# Patient Record
Sex: MALE | Race: WHITE | Employment: OTHER | URBAN - METROPOLITAN AREA
[De-identification: names, ages, dates, MRNs, and addresses within clinical notes are randomized per-mention and may not be internally consistent; named-entity substitution may affect disease eponyms.]

---

## 2017-01-30 ENCOUNTER — ANTI-COAG VISIT (OUTPATIENT)
Dept: PHARMACY | Facility: CLINIC | Age: 67
End: 2017-01-30

## 2017-01-30 DIAGNOSIS — I48.0 PAROXYSMAL ATRIAL FIBRILLATION (HCC): ICD-10-CM

## 2017-01-30 DIAGNOSIS — Z79.01 LONG TERM CURRENT USE OF ANTICOAGULANT THERAPY: ICD-10-CM

## 2017-01-30 LAB — INTERNATIONAL NORMALIZATION RATIO, POC: 3.2

## 2017-02-27 RX ORDER — SOTALOL HYDROCHLORIDE 80 MG/1
TABLET ORAL
Qty: 60 TABLET | Refills: 5 | Status: SHIPPED | OUTPATIENT
Start: 2017-02-27 | End: 2018-01-08 | Stop reason: SDUPTHER

## 2017-03-03 ENCOUNTER — ANTI-COAG VISIT (OUTPATIENT)
Dept: PHARMACY | Facility: CLINIC | Age: 67
End: 2017-03-03

## 2017-03-03 DIAGNOSIS — I48.0 PAROXYSMAL ATRIAL FIBRILLATION (HCC): ICD-10-CM

## 2017-03-03 DIAGNOSIS — Z79.01 LONG TERM CURRENT USE OF ANTICOAGULANT THERAPY: ICD-10-CM

## 2017-03-03 LAB — INTERNATIONAL NORMALIZATION RATIO, POC: 2.4

## 2017-04-03 ENCOUNTER — TELEPHONE (OUTPATIENT)
Dept: PHARMACY | Facility: CLINIC | Age: 67
End: 2017-04-03

## 2017-04-06 ENCOUNTER — HOSPITAL ENCOUNTER (OUTPATIENT)
Dept: CT IMAGING | Age: 67
Discharge: HOME | End: 2017-04-06
Attending: INTERNAL MEDICINE | Admitting: INTERNAL MEDICINE

## 2017-04-06 DIAGNOSIS — D75.1 POLYCYTHEMIA: ICD-10-CM

## 2017-04-06 DIAGNOSIS — D75.1 SECONDARY POLYCYTHEMIA: ICD-10-CM

## 2017-04-13 ENCOUNTER — ANTI-COAG VISIT (OUTPATIENT)
Dept: PHARMACY | Facility: CLINIC | Age: 67
End: 2017-04-13

## 2017-04-13 DIAGNOSIS — Z79.01 LONG TERM CURRENT USE OF ANTICOAGULANT THERAPY: ICD-10-CM

## 2017-04-13 DIAGNOSIS — I48.0 PAROXYSMAL ATRIAL FIBRILLATION (HCC): ICD-10-CM

## 2017-04-13 LAB — INR BLD: 2.4

## 2017-04-20 ENCOUNTER — OFFICE VISIT (OUTPATIENT)
Dept: CARDIOLOGY CLINIC | Age: 67
End: 2017-04-20

## 2017-04-20 VITALS
BODY MASS INDEX: 25.74 KG/M2 | SYSTOLIC BLOOD PRESSURE: 134 MMHG | WEIGHT: 207 LBS | HEART RATE: 60 BPM | DIASTOLIC BLOOD PRESSURE: 80 MMHG

## 2017-04-20 DIAGNOSIS — R00.2 PALPITATIONS: ICD-10-CM

## 2017-04-20 DIAGNOSIS — I48.0 PAROXYSMAL ATRIAL FIBRILLATION (HCC): Primary | ICD-10-CM

## 2017-04-20 DIAGNOSIS — I34.0 NON-RHEUMATIC MITRAL REGURGITATION: ICD-10-CM

## 2017-04-20 PROCEDURE — G8427 DOCREV CUR MEDS BY ELIG CLIN: HCPCS | Performed by: INTERNAL MEDICINE

## 2017-04-20 PROCEDURE — 3017F COLORECTAL CA SCREEN DOC REV: CPT | Performed by: INTERNAL MEDICINE

## 2017-04-20 PROCEDURE — 1123F ACP DISCUSS/DSCN MKR DOCD: CPT | Performed by: INTERNAL MEDICINE

## 2017-04-20 PROCEDURE — 99214 OFFICE O/P EST MOD 30 MIN: CPT | Performed by: INTERNAL MEDICINE

## 2017-04-20 PROCEDURE — 1036F TOBACCO NON-USER: CPT | Performed by: INTERNAL MEDICINE

## 2017-04-20 PROCEDURE — 4040F PNEUMOC VAC/ADMIN/RCVD: CPT | Performed by: INTERNAL MEDICINE

## 2017-04-20 PROCEDURE — G8420 CALC BMI NORM PARAMETERS: HCPCS | Performed by: INTERNAL MEDICINE

## 2017-04-28 ENCOUNTER — OFFICE VISIT (OUTPATIENT)
Dept: INTERNAL MEDICINE | Age: 67
End: 2017-04-28

## 2017-04-28 VITALS
WEIGHT: 211 LBS | SYSTOLIC BLOOD PRESSURE: 128 MMHG | HEART RATE: 60 BPM | RESPIRATION RATE: 16 BRPM | DIASTOLIC BLOOD PRESSURE: 80 MMHG | BODY MASS INDEX: 26.24 KG/M2

## 2017-04-28 DIAGNOSIS — C83.30 DLBCL (DIFFUSE LARGE B CELL LYMPHOMA) (HCC): ICD-10-CM

## 2017-04-28 DIAGNOSIS — F51.01 PRIMARY INSOMNIA: ICD-10-CM

## 2017-04-28 DIAGNOSIS — N20.0 NEPHROLITHIASIS: ICD-10-CM

## 2017-04-28 DIAGNOSIS — D75.1 POLYCYTHEMIA: Primary | ICD-10-CM

## 2017-04-28 DIAGNOSIS — I48.0 PAROXYSMAL ATRIAL FIBRILLATION (HCC): ICD-10-CM

## 2017-04-28 DIAGNOSIS — E78.00 PURE HYPERCHOLESTEROLEMIA: ICD-10-CM

## 2017-04-28 PROCEDURE — 99214 OFFICE O/P EST MOD 30 MIN: CPT | Performed by: INTERNAL MEDICINE

## 2017-04-28 PROCEDURE — 4040F PNEUMOC VAC/ADMIN/RCVD: CPT | Performed by: INTERNAL MEDICINE

## 2017-04-28 PROCEDURE — 1123F ACP DISCUSS/DSCN MKR DOCD: CPT | Performed by: INTERNAL MEDICINE

## 2017-04-28 PROCEDURE — 1036F TOBACCO NON-USER: CPT | Performed by: INTERNAL MEDICINE

## 2017-04-28 PROCEDURE — 3017F COLORECTAL CA SCREEN DOC REV: CPT | Performed by: INTERNAL MEDICINE

## 2017-04-28 PROCEDURE — G8420 CALC BMI NORM PARAMETERS: HCPCS | Performed by: INTERNAL MEDICINE

## 2017-04-28 PROCEDURE — G8427 DOCREV CUR MEDS BY ELIG CLIN: HCPCS | Performed by: INTERNAL MEDICINE

## 2017-04-28 RX ORDER — AMOXICILLIN 875 MG/1
875 TABLET, COATED ORAL 2 TIMES DAILY
Qty: 20 TABLET | Refills: 0 | Status: SHIPPED | OUTPATIENT
Start: 2017-04-28 | End: 2017-05-08

## 2017-04-28 ASSESSMENT — ENCOUNTER SYMPTOMS
WHEEZING: 0
GASTROINTESTINAL NEGATIVE: 1

## 2017-05-10 ENCOUNTER — ANTI-COAG VISIT (OUTPATIENT)
Dept: PHARMACY | Facility: CLINIC | Age: 67
End: 2017-05-10

## 2017-05-10 DIAGNOSIS — I48.0 PAROXYSMAL ATRIAL FIBRILLATION (HCC): ICD-10-CM

## 2017-05-10 DIAGNOSIS — Z79.01 LONG TERM CURRENT USE OF ANTICOAGULANT THERAPY: ICD-10-CM

## 2017-05-10 LAB — INTERNATIONAL NORMALIZATION RATIO, POC: 2.4

## 2017-06-09 ENCOUNTER — ANTI-COAG VISIT (OUTPATIENT)
Dept: PHARMACY | Facility: CLINIC | Age: 67
End: 2017-06-09

## 2017-06-09 DIAGNOSIS — I48.0 PAROXYSMAL ATRIAL FIBRILLATION (HCC): ICD-10-CM

## 2017-06-09 DIAGNOSIS — Z79.01 LONG TERM CURRENT USE OF ANTICOAGULANT THERAPY: ICD-10-CM

## 2017-06-09 LAB — INTERNATIONAL NORMALIZATION RATIO, POC: 3

## 2017-06-21 ENCOUNTER — INITIAL CONSULT (OUTPATIENT)
Dept: PULMONOLOGY | Age: 67
End: 2017-06-21

## 2017-06-21 VITALS
HEART RATE: 81 BPM | OXYGEN SATURATION: 98 % | WEIGHT: 212 LBS | HEIGHT: 73 IN | SYSTOLIC BLOOD PRESSURE: 122 MMHG | DIASTOLIC BLOOD PRESSURE: 84 MMHG | BODY MASS INDEX: 28.1 KG/M2

## 2017-06-21 DIAGNOSIS — K21.9 GERD WITHOUT ESOPHAGITIS: Chronic | ICD-10-CM

## 2017-06-21 DIAGNOSIS — G47.10 HYPERSOMNIA: Primary | ICD-10-CM

## 2017-06-21 DIAGNOSIS — R06.83 SNORING: ICD-10-CM

## 2017-06-21 DIAGNOSIS — D75.1 POLYCYTHEMIA: Chronic | ICD-10-CM

## 2017-06-21 DIAGNOSIS — I48.0 PAROXYSMAL ATRIAL FIBRILLATION (HCC): Chronic | ICD-10-CM

## 2017-06-21 PROCEDURE — 99204 OFFICE O/P NEW MOD 45 MIN: CPT | Performed by: INTERNAL MEDICINE

## 2017-06-21 PROCEDURE — 1123F ACP DISCUSS/DSCN MKR DOCD: CPT | Performed by: INTERNAL MEDICINE

## 2017-06-21 PROCEDURE — G8427 DOCREV CUR MEDS BY ELIG CLIN: HCPCS | Performed by: INTERNAL MEDICINE

## 2017-06-21 PROCEDURE — G8419 CALC BMI OUT NRM PARAM NOF/U: HCPCS | Performed by: INTERNAL MEDICINE

## 2017-06-21 PROCEDURE — 1036F TOBACCO NON-USER: CPT | Performed by: INTERNAL MEDICINE

## 2017-06-21 PROCEDURE — 3017F COLORECTAL CA SCREEN DOC REV: CPT | Performed by: INTERNAL MEDICINE

## 2017-06-21 PROCEDURE — 4040F PNEUMOC VAC/ADMIN/RCVD: CPT | Performed by: INTERNAL MEDICINE

## 2017-06-21 ASSESSMENT — ENCOUNTER SYMPTOMS
ALLERGIC/IMMUNOLOGIC NEGATIVE: 1
PHOTOPHOBIA: 0
RHINORRHEA: 0
EYE PAIN: 0
ABDOMINAL PAIN: 0
SHORTNESS OF BREATH: 0
APNEA: 1
NAUSEA: 0
CHOKING: 0
VOMITING: 0
CHEST TIGHTNESS: 0
ABDOMINAL DISTENTION: 0

## 2017-06-21 ASSESSMENT — SLEEP AND FATIGUE QUESTIONNAIRES
HOW LIKELY ARE YOU TO NOD OFF OR FALL ASLEEP WHILE SITTING QUIETLY AFTER LUNCH WITHOUT ALCOHOL: 1
HOW LIKELY ARE YOU TO NOD OFF OR FALL ASLEEP WHEN YOU ARE A PASSENGER IN A CAR FOR AN HOUR WITHOUT A BREAK: 1
HOW LIKELY ARE YOU TO NOD OFF OR FALL ASLEEP WHILE SITTING AND TALKING TO SOMEONE: 0
NECK CIRCUMFERENCE (INCHES): 16
HOW LIKELY ARE YOU TO NOD OFF OR FALL ASLEEP WHILE WATCHING TV: 1
HOW LIKELY ARE YOU TO NOD OFF OR FALL ASLEEP IN A CAR, WHILE STOPPED FOR A FEW MINUTES IN TRAFFIC: 0
HOW LIKELY ARE YOU TO NOD OFF OR FALL ASLEEP WHILE LYING DOWN TO REST IN THE AFTERNOON WHEN CIRCUMSTANCES PERMIT: 2
ESS TOTAL SCORE: 5
HOW LIKELY ARE YOU TO NOD OFF OR FALL ASLEEP WHILE SITTING AND READING: 0
HOW LIKELY ARE YOU TO NOD OFF OR FALL ASLEEP WHILE SITTING INACTIVE IN A PUBLIC PLACE: 0

## 2017-07-06 ENCOUNTER — HOSPITAL ENCOUNTER (OUTPATIENT)
Dept: SLEEP MEDICINE | Age: 67
Discharge: HOME | End: 2017-07-06

## 2017-07-06 DIAGNOSIS — R06.83 SNORING: ICD-10-CM

## 2017-07-06 DIAGNOSIS — G47.10 HYPERSOMNIA: ICD-10-CM

## 2017-07-06 PROCEDURE — 95810 POLYSOM 6/> YRS 4/> PARAM: CPT | Performed by: INTERNAL MEDICINE

## 2017-07-11 ENCOUNTER — TELEPHONE (OUTPATIENT)
Dept: SLEEP MEDICINE | Age: 67
End: 2017-07-11

## 2017-07-12 ENCOUNTER — TELEPHONE (OUTPATIENT)
Dept: SLEEP MEDICINE | Age: 67
End: 2017-07-12

## 2017-07-13 ENCOUNTER — TELEPHONE (OUTPATIENT)
Dept: SLEEP MEDICINE | Age: 67
End: 2017-07-13

## 2017-07-13 DIAGNOSIS — G47.33 OBSTRUCTIVE SLEEP APNEA (ADULT) (PEDIATRIC): Primary | ICD-10-CM

## 2017-07-14 ENCOUNTER — ANTI-COAG VISIT (OUTPATIENT)
Dept: PHARMACY | Facility: CLINIC | Age: 67
End: 2017-07-14

## 2017-07-14 DIAGNOSIS — I48.0 PAROXYSMAL ATRIAL FIBRILLATION (HCC): ICD-10-CM

## 2017-07-14 DIAGNOSIS — Z79.01 LONG TERM CURRENT USE OF ANTICOAGULANT THERAPY: ICD-10-CM

## 2017-07-14 LAB — INTERNATIONAL NORMALIZATION RATIO, POC: 2.1

## 2017-07-27 ENCOUNTER — HOSPITAL ENCOUNTER (OUTPATIENT)
Dept: SLEEP MEDICINE | Age: 67
Discharge: HOME | End: 2017-07-29
Attending: INTERNAL MEDICINE | Admitting: INTERNAL MEDICINE

## 2017-07-27 DIAGNOSIS — G47.33 OBSTRUCTIVE SLEEP APNEA (ADULT) (PEDIATRIC): ICD-10-CM

## 2017-07-27 PROCEDURE — 95811 POLYSOM 6/>YRS CPAP 4/> PARM: CPT | Performed by: INTERNAL MEDICINE

## 2017-08-01 ENCOUNTER — TELEPHONE (OUTPATIENT)
Dept: PULMONOLOGY | Age: 67
End: 2017-08-01

## 2017-08-07 ENCOUNTER — TELEPHONE (OUTPATIENT)
Dept: SLEEP MEDICINE | Age: 67
End: 2017-08-07

## 2017-08-09 ENCOUNTER — ANTI-COAG VISIT (OUTPATIENT)
Dept: PHARMACY | Facility: CLINIC | Age: 67
End: 2017-08-09

## 2017-08-09 DIAGNOSIS — Z79.01 LONG TERM CURRENT USE OF ANTICOAGULANT THERAPY: ICD-10-CM

## 2017-08-09 DIAGNOSIS — I48.0 PAROXYSMAL ATRIAL FIBRILLATION (HCC): ICD-10-CM

## 2017-08-09 LAB — INTERNATIONAL NORMALIZATION RATIO, POC: 2.2

## 2017-08-22 ENCOUNTER — OFFICE VISIT (OUTPATIENT)
Dept: INTERNAL MEDICINE | Age: 67
End: 2017-08-22

## 2017-08-22 VITALS
BODY MASS INDEX: 26 KG/M2 | SYSTOLIC BLOOD PRESSURE: 116 MMHG | WEIGHT: 208 LBS | RESPIRATION RATE: 16 BRPM | HEART RATE: 60 BPM | DIASTOLIC BLOOD PRESSURE: 70 MMHG

## 2017-08-22 DIAGNOSIS — L21.9 SEBORRHEA: Primary | ICD-10-CM

## 2017-08-22 DIAGNOSIS — G47.33 OBSTRUCTIVE SLEEP APNEA (ADULT) (PEDIATRIC): ICD-10-CM

## 2017-08-22 PROCEDURE — G8427 DOCREV CUR MEDS BY ELIG CLIN: HCPCS | Performed by: INTERNAL MEDICINE

## 2017-08-22 PROCEDURE — 4040F PNEUMOC VAC/ADMIN/RCVD: CPT | Performed by: INTERNAL MEDICINE

## 2017-08-22 PROCEDURE — 3017F COLORECTAL CA SCREEN DOC REV: CPT | Performed by: INTERNAL MEDICINE

## 2017-08-22 PROCEDURE — 1123F ACP DISCUSS/DSCN MKR DOCD: CPT | Performed by: INTERNAL MEDICINE

## 2017-08-22 PROCEDURE — 1036F TOBACCO NON-USER: CPT | Performed by: INTERNAL MEDICINE

## 2017-08-22 PROCEDURE — 99213 OFFICE O/P EST LOW 20 MIN: CPT | Performed by: INTERNAL MEDICINE

## 2017-08-22 PROCEDURE — G8419 CALC BMI OUT NRM PARAM NOF/U: HCPCS | Performed by: INTERNAL MEDICINE

## 2017-08-22 RX ORDER — RANITIDINE 150 MG/1
TABLET ORAL
Qty: 180 TABLET | Refills: 0 | Status: SHIPPED | OUTPATIENT
Start: 2017-08-22 | End: 2018-01-03 | Stop reason: SDUPTHER

## 2017-08-22 RX ORDER — MOMETASONE FUROATE 1 MG/G
CREAM TOPICAL
Qty: 30 G | Refills: 1 | Status: SHIPPED | OUTPATIENT
Start: 2017-08-22 | End: 2020-08-20

## 2017-08-22 ASSESSMENT — ENCOUNTER SYMPTOMS
GASTROINTESTINAL NEGATIVE: 1
WHEEZING: 0

## 2017-08-24 ENCOUNTER — OFFICE VISIT (OUTPATIENT)
Dept: CARDIOLOGY CLINIC | Age: 67
End: 2017-08-24

## 2017-08-24 VITALS
BODY MASS INDEX: 26.25 KG/M2 | DIASTOLIC BLOOD PRESSURE: 66 MMHG | SYSTOLIC BLOOD PRESSURE: 122 MMHG | WEIGHT: 210 LBS | HEART RATE: 72 BPM

## 2017-08-24 DIAGNOSIS — R00.2 PALPITATIONS: ICD-10-CM

## 2017-08-24 DIAGNOSIS — I48.0 PAROXYSMAL ATRIAL FIBRILLATION (HCC): Primary | ICD-10-CM

## 2017-08-24 DIAGNOSIS — I34.0 NON-RHEUMATIC MITRAL REGURGITATION: ICD-10-CM

## 2017-08-24 PROCEDURE — 99214 OFFICE O/P EST MOD 30 MIN: CPT | Performed by: INTERNAL MEDICINE

## 2017-08-24 PROCEDURE — 1036F TOBACCO NON-USER: CPT | Performed by: INTERNAL MEDICINE

## 2017-08-24 PROCEDURE — 4040F PNEUMOC VAC/ADMIN/RCVD: CPT | Performed by: INTERNAL MEDICINE

## 2017-08-24 PROCEDURE — 3017F COLORECTAL CA SCREEN DOC REV: CPT | Performed by: INTERNAL MEDICINE

## 2017-08-24 PROCEDURE — 1123F ACP DISCUSS/DSCN MKR DOCD: CPT | Performed by: INTERNAL MEDICINE

## 2017-08-24 PROCEDURE — G8427 DOCREV CUR MEDS BY ELIG CLIN: HCPCS | Performed by: INTERNAL MEDICINE

## 2017-08-24 PROCEDURE — G8419 CALC BMI OUT NRM PARAM NOF/U: HCPCS | Performed by: INTERNAL MEDICINE

## 2017-08-30 ENCOUNTER — OFFICE VISIT (OUTPATIENT)
Dept: INTERNAL MEDICINE CLINIC | Age: 67
End: 2017-08-30

## 2017-08-30 VITALS
DIASTOLIC BLOOD PRESSURE: 86 MMHG | WEIGHT: 207 LBS | HEART RATE: 64 BPM | BODY MASS INDEX: 27.43 KG/M2 | OXYGEN SATURATION: 97 % | HEIGHT: 73 IN | SYSTOLIC BLOOD PRESSURE: 147 MMHG

## 2017-08-30 DIAGNOSIS — Z00.00 PREVENTATIVE HEALTH CARE: ICD-10-CM

## 2017-08-30 DIAGNOSIS — E78.00 PURE HYPERCHOLESTEROLEMIA: Chronic | ICD-10-CM

## 2017-08-30 DIAGNOSIS — K21.9 GASTROESOPHAGEAL REFLUX DISEASE, ESOPHAGITIS PRESENCE NOT SPECIFIED: Primary | ICD-10-CM

## 2017-08-30 DIAGNOSIS — I48.0 PAROXYSMAL ATRIAL FIBRILLATION (HCC): Chronic | ICD-10-CM

## 2017-08-30 DIAGNOSIS — D45 PV (POLYCYTHEMIA VERA) (HCC): ICD-10-CM

## 2017-08-30 LAB — HEPATITIS C ANTIBODY INTERPRETATION: NORMAL

## 2017-08-30 PROCEDURE — 4040F PNEUMOC VAC/ADMIN/RCVD: CPT | Performed by: INTERNAL MEDICINE

## 2017-08-30 PROCEDURE — G8419 CALC BMI OUT NRM PARAM NOF/U: HCPCS | Performed by: INTERNAL MEDICINE

## 2017-08-30 PROCEDURE — 1036F TOBACCO NON-USER: CPT | Performed by: INTERNAL MEDICINE

## 2017-08-30 PROCEDURE — 99213 OFFICE O/P EST LOW 20 MIN: CPT | Performed by: INTERNAL MEDICINE

## 2017-08-30 PROCEDURE — 1123F ACP DISCUSS/DSCN MKR DOCD: CPT | Performed by: INTERNAL MEDICINE

## 2017-08-30 PROCEDURE — G8428 CUR MEDS NOT DOCUMENT: HCPCS | Performed by: INTERNAL MEDICINE

## 2017-08-30 PROCEDURE — 3017F COLORECTAL CA SCREEN DOC REV: CPT | Performed by: INTERNAL MEDICINE

## 2017-08-30 ASSESSMENT — ENCOUNTER SYMPTOMS
BACK PAIN: 0
NAUSEA: 0
VOMITING: 0
DIARRHEA: 0
BLOOD IN STOOL: 0
CONSTIPATION: 0
TROUBLE SWALLOWING: 0
RHINORRHEA: 0
EYE DISCHARGE: 0
ABDOMINAL PAIN: 0
APNEA: 0
WHEEZING: 0
COUGH: 0
ABDOMINAL DISTENTION: 0
SHORTNESS OF BREATH: 0
SORE THROAT: 0
EYE PAIN: 0

## 2017-09-12 ENCOUNTER — ANTI-COAG VISIT (OUTPATIENT)
Dept: PHARMACY | Facility: CLINIC | Age: 67
End: 2017-09-12

## 2017-09-12 DIAGNOSIS — I48.0 PAROXYSMAL ATRIAL FIBRILLATION (HCC): ICD-10-CM

## 2017-09-12 DIAGNOSIS — Z79.01 LONG TERM CURRENT USE OF ANTICOAGULANT THERAPY: ICD-10-CM

## 2017-09-12 LAB — INTERNATIONAL NORMALIZATION RATIO, POC: 1.8

## 2017-09-20 ENCOUNTER — OFFICE VISIT (OUTPATIENT)
Dept: PULMONOLOGY | Age: 67
End: 2017-09-20

## 2017-09-20 VITALS
SYSTOLIC BLOOD PRESSURE: 110 MMHG | BODY MASS INDEX: 28.23 KG/M2 | HEART RATE: 65 BPM | WEIGHT: 213 LBS | HEIGHT: 73 IN | OXYGEN SATURATION: 98 % | DIASTOLIC BLOOD PRESSURE: 75 MMHG

## 2017-09-20 DIAGNOSIS — I48.0 PAROXYSMAL ATRIAL FIBRILLATION (HCC): Chronic | ICD-10-CM

## 2017-09-20 DIAGNOSIS — D75.1 POLYCYTHEMIA: Chronic | ICD-10-CM

## 2017-09-20 DIAGNOSIS — G47.33 OBSTRUCTIVE SLEEP APNEA (ADULT) (PEDIATRIC): Primary | ICD-10-CM

## 2017-09-20 DIAGNOSIS — K21.9 GERD WITHOUT ESOPHAGITIS: Chronic | ICD-10-CM

## 2017-09-20 PROCEDURE — 1036F TOBACCO NON-USER: CPT | Performed by: NURSE PRACTITIONER

## 2017-09-20 PROCEDURE — G8417 CALC BMI ABV UP PARAM F/U: HCPCS | Performed by: NURSE PRACTITIONER

## 2017-09-20 PROCEDURE — 4040F PNEUMOC VAC/ADMIN/RCVD: CPT | Performed by: NURSE PRACTITIONER

## 2017-09-20 PROCEDURE — G8427 DOCREV CUR MEDS BY ELIG CLIN: HCPCS | Performed by: NURSE PRACTITIONER

## 2017-09-20 PROCEDURE — 3017F COLORECTAL CA SCREEN DOC REV: CPT | Performed by: NURSE PRACTITIONER

## 2017-09-20 PROCEDURE — 99214 OFFICE O/P EST MOD 30 MIN: CPT | Performed by: NURSE PRACTITIONER

## 2017-09-20 PROCEDURE — 1123F ACP DISCUSS/DSCN MKR DOCD: CPT | Performed by: NURSE PRACTITIONER

## 2017-09-20 ASSESSMENT — SLEEP AND FATIGUE QUESTIONNAIRES
HOW LIKELY ARE YOU TO NOD OFF OR FALL ASLEEP WHILE WATCHING TV: 2
HOW LIKELY ARE YOU TO NOD OFF OR FALL ASLEEP WHILE SITTING INACTIVE IN A PUBLIC PLACE: 0
HOW LIKELY ARE YOU TO NOD OFF OR FALL ASLEEP WHEN YOU ARE A PASSENGER IN A CAR FOR AN HOUR WITHOUT A BREAK: 0
HOW LIKELY ARE YOU TO NOD OFF OR FALL ASLEEP WHILE SITTING AND READING: 0
HOW LIKELY ARE YOU TO NOD OFF OR FALL ASLEEP IN A CAR, WHILE STOPPED FOR A FEW MINUTES IN TRAFFIC: 0
HOW LIKELY ARE YOU TO NOD OFF OR FALL ASLEEP WHILE LYING DOWN TO REST IN THE AFTERNOON WHEN CIRCUMSTANCES PERMIT: 0
HOW LIKELY ARE YOU TO NOD OFF OR FALL ASLEEP WHILE SITTING QUIETLY AFTER LUNCH WITHOUT ALCOHOL: 0
ESS TOTAL SCORE: 2
HOW LIKELY ARE YOU TO NOD OFF OR FALL ASLEEP WHILE SITTING AND TALKING TO SOMEONE: 0

## 2017-09-20 ASSESSMENT — ENCOUNTER SYMPTOMS
ABDOMINAL DISTENTION: 0
ABDOMINAL PAIN: 0
COUGH: 0
SINUS PRESSURE: 0
APNEA: 0
RHINORRHEA: 0
SHORTNESS OF BREATH: 0

## 2017-10-11 ENCOUNTER — HOSPITAL ENCOUNTER (OUTPATIENT)
Dept: OTHER | Age: 67
Discharge: HOME | End: 2017-10-31
Attending: INTERNAL MEDICINE | Admitting: INTERNAL MEDICINE

## 2017-10-11 ENCOUNTER — ANTI-COAG VISIT (OUTPATIENT)
Dept: PHARMACY | Facility: CLINIC | Age: 67
End: 2017-10-11

## 2017-10-11 DIAGNOSIS — I48.0 PAROXYSMAL ATRIAL FIBRILLATION (HCC): ICD-10-CM

## 2017-10-11 DIAGNOSIS — Z79.01 LONG TERM CURRENT USE OF ANTICOAGULANT THERAPY: ICD-10-CM

## 2017-10-11 LAB — INTERNATIONAL NORMALIZATION RATIO, POC: 2

## 2017-10-11 NOTE — PROGRESS NOTES
ANTICOAGULATION SERVICE    Alejandra Jones is a 75 y.o. male with PMHx significant for Afib, internal hemorrhoids, DVT, lymphoma, subdural hematoma who presents to clinic 10/11/2017 for anticoagulation monitoring and adjustment.     Anticoagulation Indication(s):  Afib + h/o DVT  Referring Physician:  Dr. Shaina Ortiz (switched to Dr. Lawanda Alcala on 8/30, faxed new referral x2)  Goal INR Range:  2-3  Duration of Anticoagulation Therapy:  Indefinite  Time of day dose taken:  PM  Product patient has at home:  warfarin 5 mg    INR Summary                           Warfarin regimen (mg)  Date INR   A/P   Sun Mon Tue Wed Thu Fri Sat Mg/wk  10/11 2.0 At goal, no change 5 2.5 5 5 5 2.5 5 30  9/12 1.8 Below goal, continue 5 2.5 5 5 5 2.5 5 30  8/9 2.2 At goal, no change 5 2.5 5 5 5 2.5 5 30  7/14 2.1 At goal, no change 5 2.5 5 5 5 2.5 5 30  6/9 3.0 At goal, no change 5 2.5 5 5 5 2.5 5 30  5/10 2.4 At goal, no change 5 2.5 5 5 5 2.5 5 30  4/13 2.4 At goal, no change 5 2.5 5 5 5 2.5 5 30  3/3 2.4 At goal, no change 5 2.5 5 5 5 2.5 5 30  1/30 3.2 Above goal, continue 5 2.5 5 5 5 2.5 5 30  12/22 2.8  At goal, no change 5 2.5 5 5 5 2.5 5 30  11/17 2.8 At goal, no change 5 2.5 5 5 5 2.5 5 30  10/21 2.3 At goal, continue 5 2.5 5 5 5 2.5 5 30    Patient History:   Recent hospitalizations/HC visits OV 9/20 pul for sleep apnea: no med changes   Recent medication changes None reported   Medications taken regularly that may interact with warfarin or alter INR No significant drug interactions identified   Warfarin dose taken as prescribed Yes; uses pillbox   Signs/symptoms of bleeding None reported    Vitamin K intake -Normally has ~3-4 servings/week of broccoli/salads (no kale)  -Patient eats 1/2 can of spinach 3x/wk (Tu/Th/F)   Recent vomiting/diarrhea/fever, changes in weight or activity level None reported   Tobacco or alcohol use Denies use of either   Upcoming surgeries or procedures None reported     Assessment/Plan:   Patient's INR was

## 2017-11-01 ENCOUNTER — HOSPITAL ENCOUNTER (OUTPATIENT)
Dept: OTHER | Age: 67
Discharge: HOME | End: 2017-11-30
Attending: INTERNAL MEDICINE | Admitting: INTERNAL MEDICINE

## 2017-11-15 ENCOUNTER — ANTI-COAG VISIT (OUTPATIENT)
Dept: PHARMACY | Facility: CLINIC | Age: 67
End: 2017-11-15

## 2017-11-15 DIAGNOSIS — I48.0 PAROXYSMAL ATRIAL FIBRILLATION (HCC): ICD-10-CM

## 2017-11-15 DIAGNOSIS — Z79.01 LONG TERM CURRENT USE OF ANTICOAGULANT THERAPY: ICD-10-CM

## 2017-11-15 LAB — INTERNATIONAL NORMALIZATION RATIO, POC: 1.7

## 2017-11-15 NOTE — PROGRESS NOTES
ANTICOAGULATION SERVICE    Avi Osborne is a 75 y.o. male with PMHx significant for Afib, internal hemorrhoids, DVT, lymphoma, subdural hematoma who presents to clinic 11/15/2017 for anticoagulation monitoring and adjustment.     Anticoagulation Indication(s):  Afib + h/o DVT  Referring Physician:  Dr. Andree Pratt  Goal INR Range:  2-3  Duration of Anticoagulation Therapy:  Indefinite  Time of day dose taken:  PM  Product patient has at home:  warfarin 5 mg    INR Summary                           Warfarin regimen (mg)  Date INR   A/P   Sun Mon Tue Wed Thu Fri Sat Mg/wk  11/15 1.7 Below goal, continue 5 2.5 5 5 5 2.5 5 30  10/11 2.0 At goal, no change 5 2.5 5 5 5 2.5 5 30  9/12 1.8 Below goal, continue 5 2.5 5 5 5 2.5 5 30  8/9 2.2 At goal, no change 5 2.5 5 5 5 2.5 5 30  7/14 2.1 At goal, no change 5 2.5 5 5 5 2.5 5 30  6/9 3.0 At goal, no change 5 2.5 5 5 5 2.5 5 30  5/10 2.4 At goal, no change 5 2.5 5 5 5 2.5 5 30  4/13 2.4 At goal, no change 5 2.5 5 5 5 2.5 5 30  3/3 2.4 At goal, no change 5 2.5 5 5 5 2.5 5 30  1/30 3.2 Above goal, continue 5 2.5 5 5 5 2.5 5 30  12/22 2.8  At goal, no change 5 2.5 5 5 5 2.5 5 30  11/17 2.8 At goal, no change 5 2.5 5 5 5 2.5 5 30  10/21 2.3 At goal, continue 5 2.5 5 5 5 2.5 5 30    Patient History:   Recent hospitalizations/HC visits None since last visit   Recent medication changes None reported   Medications taken regularly that may interact with warfarin or alter INR No significant drug interactions identified   Warfarin dose taken as prescribed Yes; uses pillbox   Signs/symptoms of bleeding None reported    Vitamin K intake -Normally has ~3-4 servings/week of broccoli/salads (no kale)  -Patient eats 1/2 can of spinach 3x/wk (Tu/Th/F)   Recent vomiting/diarrhea/fever, changes in weight or activity level None reported   Tobacco or alcohol use Denies use of either   Upcoming surgeries or procedures None reported     Assessment/Plan:   Patient's INR was subtherapeutic today (1.7), likely due to increased vitamin K intake over the past few days. Patient was instructed to continue warfarin regimen of 2.5 mg on M/F and 5 mg all other days, and resume usual vitamin K intake. Repeat INR in 4 weeks. Patient was reminded to maintain consistent vitamin K intake and call with any bleeding, medication changes, or fever/vomiting/diarrhea. Next Clinic Appointment:  12/13    Please call Bin at [phone] with any questions. Thanks! Kailyn Billingsley.  Dinesh Malhotra, PharmD  [facility]  Ph: [phone]  11/15/2017 9:59 AM

## 2017-12-01 ENCOUNTER — HOSPITAL ENCOUNTER (OUTPATIENT)
Dept: OTHER | Age: 67
Discharge: HOME | End: 2017-12-31
Attending: INTERNAL MEDICINE | Admitting: INTERNAL MEDICINE

## 2017-12-13 ENCOUNTER — OFFICE VISIT (OUTPATIENT)
Dept: CARDIOLOGY CLINIC | Age: 67
End: 2017-12-13

## 2017-12-13 ENCOUNTER — ANTI-COAG VISIT (OUTPATIENT)
Dept: PHARMACY | Facility: CLINIC | Age: 67
End: 2017-12-13

## 2017-12-13 VITALS
WEIGHT: 216 LBS | DIASTOLIC BLOOD PRESSURE: 80 MMHG | BODY MASS INDEX: 28.5 KG/M2 | SYSTOLIC BLOOD PRESSURE: 132 MMHG | HEART RATE: 58 BPM

## 2017-12-13 DIAGNOSIS — I48.0 PAROXYSMAL ATRIAL FIBRILLATION (HCC): Primary | ICD-10-CM

## 2017-12-13 DIAGNOSIS — R00.2 PALPITATIONS: ICD-10-CM

## 2017-12-13 DIAGNOSIS — I48.0 PAROXYSMAL ATRIAL FIBRILLATION (HCC): ICD-10-CM

## 2017-12-13 DIAGNOSIS — Z79.01 LONG TERM CURRENT USE OF ANTICOAGULANT THERAPY: ICD-10-CM

## 2017-12-13 DIAGNOSIS — I34.0 NON-RHEUMATIC MITRAL REGURGITATION: ICD-10-CM

## 2017-12-13 LAB — INTERNATIONAL NORMALIZATION RATIO, POC: 2.1

## 2017-12-13 PROCEDURE — 1036F TOBACCO NON-USER: CPT | Performed by: INTERNAL MEDICINE

## 2017-12-13 PROCEDURE — 4040F PNEUMOC VAC/ADMIN/RCVD: CPT | Performed by: INTERNAL MEDICINE

## 2017-12-13 PROCEDURE — 99214 OFFICE O/P EST MOD 30 MIN: CPT | Performed by: INTERNAL MEDICINE

## 2017-12-13 PROCEDURE — G8484 FLU IMMUNIZE NO ADMIN: HCPCS | Performed by: INTERNAL MEDICINE

## 2017-12-13 PROCEDURE — G8427 DOCREV CUR MEDS BY ELIG CLIN: HCPCS | Performed by: INTERNAL MEDICINE

## 2017-12-13 PROCEDURE — 1123F ACP DISCUSS/DSCN MKR DOCD: CPT | Performed by: INTERNAL MEDICINE

## 2017-12-13 PROCEDURE — 3017F COLORECTAL CA SCREEN DOC REV: CPT | Performed by: INTERNAL MEDICINE

## 2017-12-13 PROCEDURE — G8417 CALC BMI ABV UP PARAM F/U: HCPCS | Performed by: INTERNAL MEDICINE

## 2017-12-13 NOTE — PROGRESS NOTES
Subjective:      Patient ID: Alejandra Jones is a 75 y.o. male. HPI Meera Castillo is here today for a 4 month follow up afib/palp/mr. No complaints. Rhythm good. No sob. No edema. No pnd or orthopnea. No syncope. Cold sx. Past Medical History:   Diagnosis Date    AF (atrial fibrillation) (HCC)     Aortic valve disease     leaking no problems    Lymphoma [facility])      Past Surgical History:   Procedure Laterality Date    APPENDECTOMY      COLONOSCOPY  8/18/2006     Dr. Cierra Vance - internal hemorrhoids , hyperplastic polyp     CYSTOSCOPY  1-11-10     Dr. Edita Pacheco ureteral stent     LYMPH NODE BIOPSY  5/11/11    RIGHT CERVICAL    LYMPH NODE DISSECTION N/A 2011    TONSILLECTOMY           Allergies   Allergen Reactions    Gentamicin Other (See Comments)     Dad had reaction        Social History     Social History    Marital status:      Spouse name: N/A    Number of children: 0    Years of education: N/A     Occupational History    retired      Social History Main Topics    Smoking status: Never Smoker    Smokeless tobacco: Never Used    Alcohol use No    Drug use: No    Sexual activity: Yes     Partners: Female     Other Topics Concern    Not on file     Social History Narrative    No narrative on file        Patient has a family history includes Heart Disease in his mother. Patient  has a past medical history of AF (atrial fibrillation) ([address] Aortic valve disease; and Lymphoma ([facility] [address] Current Outpatient Prescriptions   Medication Sig Dispense Refill    ranitidine (ZANTAC) 150 MG tablet TAKE ONE TABLET BY MOUTH TWICE A  tablet 0    mometasone (ELOCON) 0.1 % cream Apply topically 2 times daily as needed.  30 g 1    warfarin (COUMADIN) 5 MG tablet TAKE ONE-HALF TABLET BY MOUTH ON MONDAY AND FRIDAY, TAKE ONE TABLET ON ALL OTHER DAYS 90 tablet 3    sotalol (BETAPACE) 80 MG tablet TAKE ONE TABLET BY MOUTH TWICE A DAY 60 tablet 5    amoxicillin (AMOXIL) 500 MG capsule Take fibrillation (HCC)     2. Palpitations     3. Non-rheumatic mitral regurgitation             Plan:      CV stable. Active. Rhythm stable. HR stable. Continues exercising. Rhythm may be better on CPAP. On AC. No bleeding issues. Remains compensated. No changes. Reviewed previous records and testing including echo 3/15. Continue to monitor. Follow up 4 months.

## 2017-12-13 NOTE — PROGRESS NOTES
ANTICOAGULATION SERVICE    Lisa Odonnell is a 75 y.o. male with PMHx significant for Afib, internal hemorrhoids, DVT, lymphoma, subdural hematoma who presents to clinic 12/13/2017 for anticoagulation monitoring and adjustment. Anticoagulation Indication(s):  Afib + h/o DVT  Referring Physician:  Dr. Etelvina Marvin  Goal INR Range:  2-3  Duration of Anticoagulation Therapy:  Indefinite  Time of day dose taken:  PM  Product patient has at home:  warfarin 5 mg    INR Summary                           Warfarin regimen (mg)  Date INR   A/P   Sun Mon Tue Wed Thu Fri Sat Mg/wk  12/13 2.1 At goal, no change 5 2.5 5 5 5 2.5 5 30  11/15 1.7 Below goal, continue 5 2.5 5 5 5 2.5 5 30  10/11 2.0 At goal, no change 5 2.5 5 5 5 2.5 5 30  9/12 1.8 Below goal, continue 5 2.5 5 5 5 2.5 5 30  8/9 2.2 At goal, no change 5 2.5 5 5 5 2.5 5 30  7/14 2.1 At goal, no change 5 2.5 5 5 5 2.5 5 30  6/9 3.0 At goal, no change 5 2.5 5 5 5 2.5 5 30  5/10 2.4 At goal, no change 5 2.5 5 5 5 2.5 5 30  4/13 2.4 At goal, no change 5 2.5 5 5 5 2.5 5 30  3/3 2.4 At goal, no change 5 2.5 5 5 5 2.5 5 30  1/30 3.2 Above goal, continue 5 2.5 5 5 5 2.5 5 30  12/22 2.8  At goal, no change 5 2.5 5 5 5 2.5 5 30    Patient History:   Recent hospitalizations/HC visits None since last visit   Recent medication changes None reported   Medications taken regularly that may interact with warfarin or alter INR No significant drug interactions identified   Warfarin dose taken as prescribed Yes; uses pillbox   Signs/symptoms of bleeding None reported    Vitamin K intake -Normally has ~3-4 servings/week of broccoli/salads (no kale)  -Patient eats 1/2 can of spinach 3x/wk (Tu/Th/F)   Recent vomiting/diarrhea/fever, changes in weight or activity level None reported   Tobacco or alcohol use Denies use of either   Upcoming surgeries or procedures None reported     Assessment/Plan:   Patient's INR was therapeutic today (2.1).   Patient was instructed to continue warfarin regimen

## 2017-12-20 ENCOUNTER — OFFICE VISIT (OUTPATIENT)
Dept: PULMONOLOGY | Age: 67
End: 2017-12-20

## 2017-12-20 VITALS
HEIGHT: 73 IN | DIASTOLIC BLOOD PRESSURE: 60 MMHG | SYSTOLIC BLOOD PRESSURE: 110 MMHG | HEART RATE: 72 BPM | WEIGHT: 216 LBS | OXYGEN SATURATION: 98 % | BODY MASS INDEX: 28.63 KG/M2

## 2017-12-20 DIAGNOSIS — G47.33 OBSTRUCTIVE SLEEP APNEA SYNDROME: Primary | Chronic | ICD-10-CM

## 2017-12-20 DIAGNOSIS — K21.9 GERD WITHOUT ESOPHAGITIS: Chronic | ICD-10-CM

## 2017-12-20 DIAGNOSIS — I48.0 PAROXYSMAL ATRIAL FIBRILLATION (HCC): Chronic | ICD-10-CM

## 2017-12-20 DIAGNOSIS — D75.1 POLYCYTHEMIA: Chronic | ICD-10-CM

## 2017-12-20 PROCEDURE — 1123F ACP DISCUSS/DSCN MKR DOCD: CPT | Performed by: NURSE PRACTITIONER

## 2017-12-20 PROCEDURE — 1036F TOBACCO NON-USER: CPT | Performed by: NURSE PRACTITIONER

## 2017-12-20 PROCEDURE — G8417 CALC BMI ABV UP PARAM F/U: HCPCS | Performed by: NURSE PRACTITIONER

## 2017-12-20 PROCEDURE — 3017F COLORECTAL CA SCREEN DOC REV: CPT | Performed by: NURSE PRACTITIONER

## 2017-12-20 PROCEDURE — 4040F PNEUMOC VAC/ADMIN/RCVD: CPT | Performed by: NURSE PRACTITIONER

## 2017-12-20 PROCEDURE — G8427 DOCREV CUR MEDS BY ELIG CLIN: HCPCS | Performed by: NURSE PRACTITIONER

## 2017-12-20 PROCEDURE — 99214 OFFICE O/P EST MOD 30 MIN: CPT | Performed by: NURSE PRACTITIONER

## 2017-12-20 PROCEDURE — G8484 FLU IMMUNIZE NO ADMIN: HCPCS | Performed by: NURSE PRACTITIONER

## 2017-12-20 ASSESSMENT — SLEEP AND FATIGUE QUESTIONNAIRES
HOW LIKELY ARE YOU TO NOD OFF OR FALL ASLEEP WHILE SITTING AND TALKING TO SOMEONE: 0
HOW LIKELY ARE YOU TO NOD OFF OR FALL ASLEEP WHEN YOU ARE A PASSENGER IN A CAR FOR AN HOUR WITHOUT A BREAK: 0
HOW LIKELY ARE YOU TO NOD OFF OR FALL ASLEEP WHILE LYING DOWN TO REST IN THE AFTERNOON WHEN CIRCUMSTANCES PERMIT: 0
ESS TOTAL SCORE: 1
HOW LIKELY ARE YOU TO NOD OFF OR FALL ASLEEP WHILE WATCHING TV: 1
HOW LIKELY ARE YOU TO NOD OFF OR FALL ASLEEP WHILE SITTING AND READING: 0
HOW LIKELY ARE YOU TO NOD OFF OR FALL ASLEEP WHILE SITTING QUIETLY AFTER LUNCH WITHOUT ALCOHOL: 0
HOW LIKELY ARE YOU TO NOD OFF OR FALL ASLEEP IN A CAR, WHILE STOPPED FOR A FEW MINUTES IN TRAFFIC: 0
HOW LIKELY ARE YOU TO NOD OFF OR FALL ASLEEP WHILE SITTING INACTIVE IN A PUBLIC PLACE: 0

## 2017-12-20 ASSESSMENT — ENCOUNTER SYMPTOMS
ABDOMINAL DISTENTION: 0
COUGH: 0
SINUS PRESSURE: 0
RHINORRHEA: 0
ABDOMINAL PAIN: 0
SHORTNESS OF BREATH: 0
APNEA: 0

## 2018-01-01 ENCOUNTER — HOSPITAL ENCOUNTER (OUTPATIENT)
Dept: OTHER | Age: 68
Discharge: HOME | End: 2018-01-31
Attending: INTERNAL MEDICINE | Admitting: INTERNAL MEDICINE

## 2018-01-03 RX ORDER — RANITIDINE 150 MG/1
TABLET ORAL
Qty: 180 TABLET | Refills: 0 | Status: SHIPPED | OUTPATIENT
Start: 2018-01-03 | End: 2020-06-04 | Stop reason: ALTCHOICE

## 2018-01-08 RX ORDER — SOTALOL HYDROCHLORIDE 80 MG/1
TABLET ORAL
Qty: 60 TABLET | Refills: 5 | Status: SHIPPED | OUTPATIENT
Start: 2018-01-08 | End: 2018-07-16 | Stop reason: SDUPTHER

## 2018-01-11 ENCOUNTER — ANTI-COAG VISIT (OUTPATIENT)
Dept: PHARMACY | Facility: CLINIC | Age: 68
End: 2018-01-11

## 2018-01-11 DIAGNOSIS — Z79.01 LONG TERM CURRENT USE OF ANTICOAGULANT THERAPY: ICD-10-CM

## 2018-01-11 DIAGNOSIS — I48.0 PAROXYSMAL ATRIAL FIBRILLATION (HCC): ICD-10-CM

## 2018-01-11 LAB — INTERNATIONAL NORMALIZATION RATIO, POC: 2

## 2018-01-11 NOTE — PROGRESS NOTES
ANTICOAGULATION SERVICE    Tiny Martino is a 75 y.o. male with PMHx significant for Afib, internal hemorrhoids, DVT, lymphoma, subdural hematoma who presents to clinic 1/11/2018 for anticoagulation monitoring and adjustment. Anticoagulation Indication(s):  Afib + h/o DVT  Referring Physician:  Dr. Ceferino Martínez  Goal INR Range:  2-3  Duration of Anticoagulation Therapy:  Indefinite  Time of day dose taken:  PM  Product patient has at home:  warfarin 5 mg    INR Summary                           Warfarin regimen (mg)  Date INR   A/P   Sun Mon Tue Wed Thu Fri Sat Mg/wk  1/11 2.0 At goal, no change 5 2.5 5 5 5 2.5 5 30  12/13 2.1 At goal, no change 5 2.5 5 5 5 2.5 5 30  11/15 1.7 Below goal, continue 5 2.5 5 5 5 2.5 5 30  10/11 2.0 At goal, no change 5 2.5 5 5 5 2.5 5 30  9/12 1.8 Below goal, continue 5 2.5 5 5 5 2.5 5 30  8/9 2.2 At goal, no change 5 2.5 5 5 5 2.5 5 30  7/14 2.1 At goal, no change 5 2.5 5 5 5 2.5 5 30  6/9 3.0 At goal, no change 5 2.5 5 5 5 2.5 5 30  5/10 2.4 At goal, no change 5 2.5 5 5 5 2.5 5 30  4/13 2.4 At goal, no change 5 2.5 5 5 5 2.5 5 30  3/3 2.4 At goal, no change 5 2.5 5 5 5 2.5 5 30  1/30 3.2 Above goal, continue 5 2.5 5 5 5 2.5 5 30  12/22 2.8  At goal, no change 5 2.5 5 5 5 2.5 5 30    Patient History:   Recent hospitalizations/HC visits None since last visit   Recent medication changes None reported   Medications taken regularly that may interact with warfarin or alter INR No significant drug interactions identified   Warfarin dose taken as prescribed Yes; uses pillbox   Signs/symptoms of bleeding None reported    Vitamin K intake -Normally has ~3-4 servings/week of broccoli/salads (no kale)  -Patient eats 1/2 can of spinach 3x/wk (Tu/Th/F)   Recent vomiting/diarrhea/fever, changes in weight or activity level None reported   Tobacco or alcohol use Denies use of either   Upcoming surgeries or procedures None reported     Assessment/Plan:   Patient's INR was therapeutic today (2).

## 2018-02-01 ENCOUNTER — HOSPITAL ENCOUNTER (OUTPATIENT)
Dept: OTHER | Age: 68
Discharge: HOME | End: 2018-02-28
Attending: INTERNAL MEDICINE | Admitting: INTERNAL MEDICINE

## 2018-02-09 ENCOUNTER — ANTI-COAG VISIT (OUTPATIENT)
Dept: PHARMACY | Facility: CLINIC | Age: 68
End: 2018-02-09

## 2018-02-09 DIAGNOSIS — Z79.01 LONG TERM CURRENT USE OF ANTICOAGULANT THERAPY: ICD-10-CM

## 2018-02-09 DIAGNOSIS — I48.0 PAROXYSMAL ATRIAL FIBRILLATION (HCC): ICD-10-CM

## 2018-02-09 LAB — INTERNATIONAL NORMALIZATION RATIO, POC: 2.2

## 2018-03-01 ENCOUNTER — HOSPITAL ENCOUNTER (OUTPATIENT)
Dept: OTHER | Age: 68
Discharge: HOME | End: 2018-03-31
Attending: INTERNAL MEDICINE | Admitting: INTERNAL MEDICINE

## 2018-03-14 ENCOUNTER — ANTI-COAG VISIT (OUTPATIENT)
Dept: PHARMACY | Facility: CLINIC | Age: 68
End: 2018-03-14

## 2018-03-14 DIAGNOSIS — I48.0 PAROXYSMAL ATRIAL FIBRILLATION (HCC): ICD-10-CM

## 2018-03-14 DIAGNOSIS — Z79.01 LONG TERM CURRENT USE OF ANTICOAGULANT THERAPY: ICD-10-CM

## 2018-03-14 LAB
INTERNATIONAL NORMALIZATION RATIO, POC: 1.9
INTERNATIONAL NORMALIZATION RATIO, POC: 1.9

## 2018-03-14 NOTE — PROGRESS NOTES
ANTICOAGULATION SERVICE    Le Pinto is a 75 y.o. male with PMHx significant for Afib, internal hemorrhoids, DVT, lymphoma, subdural hematoma who presents to clinic 3/14/2018 for anticoagulation monitoring and adjustment.     Anticoagulation Indication(s):  Afib + h/o DVT  Referring Physician:  Dr. Anai Baldwin  Goal INR Range:  2-3  Duration of Anticoagulation Therapy:  Indefinite  Time of day dose taken:  PM  Product patient has at home:  warfarin 5 mg    INR Summary                           Warfarin regimen (mg)  Date INR   A/P   Sun Mon Tue Wed Thu Fri Sat Mg/wk  3/14 1.9 Below goal, continue 5 2.5 5 5 5 2.5 5 30  2/9 2.2 At goal, no change 5 2.5 5 5 5 2.5 5 30  1/11 2.0 At goal, no change 5 2.5 5 5 5 2.5 5 30  12/13 2.1 At goal, no change 5 2.5 5 5 5 2.5 5 30  11/15 1.7 Below goal, continue 5 2.5 5 5 5 2.5 5 30  10/11 2.0 At goal, no change 5 2.5 5 5 5 2.5 5 30  9/12 1.8 Below goal, continue 5 2.5 5 5 5 2.5 5 30  8/9 2.2 At goal, no change 5 2.5 5 5 5 2.5 5 30  7/14 2.1 At goal, no change 5 2.5 5 5 5 2.5 5 30  6/9 3.0 At goal, no change 5 2.5 5 5 5 2.5 5 30  5/10 2.4 At goal, no change 5 2.5 5 5 5 2.5 5 30  4/13 2.4 At goal, no change 5 2.5 5 5 5 2.5 5 30  3/3 2.4 At goal, no change 5 2.5 5 5 5 2.5 5 30      Patient History:   Recent hospitalizations/HC visits None since last visit   Recent medication changes None reported   Medications taken regularly that may interact with warfarin or alter INR No significant drug interactions identified   Warfarin dose taken as prescribed Yes; uses pillbox   Signs/symptoms of bleeding None reported    Vitamin K intake -Normally has ~3-4 servings/week of broccoli/salads (no kale)  -Patient eats 1/2 can of spinach 3x/wk (Tu/Th/F)   Recent vomiting/diarrhea/fever, changes in weight or activity level None reported   Tobacco or alcohol use Denies use of either   Upcoming surgeries or procedures None reported     Assessment/Plan:   Patient's INR was slightly subtherapeutic today

## 2018-03-29 RX ORDER — WARFARIN SODIUM 5 MG/1
TABLET ORAL
Qty: 90 TABLET | Refills: 0 | Status: CANCELLED | OUTPATIENT
Start: 2018-03-29

## 2018-04-01 ENCOUNTER — HOSPITAL ENCOUNTER (OUTPATIENT)
Dept: OTHER | Age: 68
Discharge: HOME | End: 2018-04-30
Attending: INTERNAL MEDICINE | Admitting: INTERNAL MEDICINE

## 2018-04-04 ENCOUNTER — ANTI-COAG VISIT (OUTPATIENT)
Dept: PHARMACY | Facility: CLINIC | Age: 68
End: 2018-04-04

## 2018-04-04 DIAGNOSIS — I48.0 PAROXYSMAL ATRIAL FIBRILLATION (HCC): ICD-10-CM

## 2018-04-04 DIAGNOSIS — Z79.01 LONG TERM CURRENT USE OF ANTICOAGULANT THERAPY: ICD-10-CM

## 2018-04-04 LAB — INTERNATIONAL NORMALIZATION RATIO, POC: 2.2

## 2018-04-04 RX ORDER — WARFARIN SODIUM 5 MG/1
TABLET ORAL
Qty: 90 TABLET | Refills: 2 | Status: SHIPPED | OUTPATIENT
Start: 2018-04-04 | End: 2018-07-16 | Stop reason: SDUPTHER

## 2018-05-01 ENCOUNTER — HOSPITAL ENCOUNTER (OUTPATIENT)
Dept: OTHER | Age: 68
Discharge: HOME | End: 2018-05-31
Attending: INTERNAL MEDICINE | Admitting: INTERNAL MEDICINE

## 2018-05-02 ENCOUNTER — ANTI-COAG VISIT (OUTPATIENT)
Dept: PHARMACY | Facility: CLINIC | Age: 68
End: 2018-05-02

## 2018-05-02 DIAGNOSIS — Z79.01 LONG TERM CURRENT USE OF ANTICOAGULANT THERAPY: ICD-10-CM

## 2018-05-02 DIAGNOSIS — I48.0 PAROXYSMAL ATRIAL FIBRILLATION (HCC): ICD-10-CM

## 2018-05-02 LAB — INTERNATIONAL NORMALIZATION RATIO, POC: 3.1

## 2018-05-10 ENCOUNTER — TELEPHONE (OUTPATIENT)
Dept: INTERNAL MEDICINE CLINIC | Age: 68
End: 2018-05-10

## 2018-05-23 ENCOUNTER — TELEPHONE (OUTPATIENT)
Dept: CARDIOLOGY CLINIC | Age: 68
End: 2018-05-23

## 2018-05-23 DIAGNOSIS — I48.0 PAROXYSMAL ATRIAL FIBRILLATION (HCC): Primary | ICD-10-CM

## 2018-05-29 ENCOUNTER — OFFICE VISIT (OUTPATIENT)
Dept: DERMATOLOGY | Age: 68
End: 2018-05-29

## 2018-05-29 ENCOUNTER — TELEPHONE (OUTPATIENT)
Dept: CARDIOLOGY CLINIC | Age: 68
End: 2018-05-29

## 2018-05-29 DIAGNOSIS — L82.0 INFLAMED SEBORRHEIC KERATOSIS: Primary | ICD-10-CM

## 2018-05-29 PROCEDURE — 17110 DESTRUCTION B9 LES UP TO 14: CPT | Performed by: DERMATOLOGY

## 2018-06-01 ENCOUNTER — ANTI-COAG VISIT (OUTPATIENT)
Dept: PHARMACY | Facility: CLINIC | Age: 68
End: 2018-06-01

## 2018-06-01 ENCOUNTER — HOSPITAL ENCOUNTER (OUTPATIENT)
Dept: OTHER | Age: 68
Discharge: HOME | End: 2018-06-30
Attending: INTERNAL MEDICINE | Admitting: INTERNAL MEDICINE

## 2018-06-01 DIAGNOSIS — I48.0 PAROXYSMAL ATRIAL FIBRILLATION (HCC): Primary | Chronic | ICD-10-CM

## 2018-06-01 LAB — INTERNATIONAL NORMALIZATION RATIO, POC: 2.5

## 2018-06-27 ENCOUNTER — OFFICE VISIT (OUTPATIENT)
Dept: CARDIOLOGY CLINIC | Age: 68
End: 2018-06-27

## 2018-06-27 ENCOUNTER — ANTI-COAG VISIT (OUTPATIENT)
Dept: PHARMACY | Facility: CLINIC | Age: 68
End: 2018-06-27

## 2018-06-27 VITALS
HEART RATE: 58 BPM | SYSTOLIC BLOOD PRESSURE: 118 MMHG | WEIGHT: 216 LBS | BODY MASS INDEX: 28.5 KG/M2 | DIASTOLIC BLOOD PRESSURE: 70 MMHG

## 2018-06-27 DIAGNOSIS — R00.2 PALPITATIONS: ICD-10-CM

## 2018-06-27 DIAGNOSIS — I48.0 PAROXYSMAL ATRIAL FIBRILLATION (HCC): Primary | ICD-10-CM

## 2018-06-27 DIAGNOSIS — Z79.01 LONG TERM CURRENT USE OF ANTICOAGULANT THERAPY: ICD-10-CM

## 2018-06-27 DIAGNOSIS — I34.0 NON-RHEUMATIC MITRAL REGURGITATION: ICD-10-CM

## 2018-06-27 DIAGNOSIS — I48.0 PAROXYSMAL ATRIAL FIBRILLATION (HCC): ICD-10-CM

## 2018-06-27 LAB — INTERNATIONAL NORMALIZATION RATIO, POC: 2.3

## 2018-06-27 PROCEDURE — 1036F TOBACCO NON-USER: CPT | Performed by: INTERNAL MEDICINE

## 2018-06-27 PROCEDURE — 4040F PNEUMOC VAC/ADMIN/RCVD: CPT | Performed by: INTERNAL MEDICINE

## 2018-06-27 PROCEDURE — 3017F COLORECTAL CA SCREEN DOC REV: CPT | Performed by: INTERNAL MEDICINE

## 2018-06-27 PROCEDURE — G8417 CALC BMI ABV UP PARAM F/U: HCPCS | Performed by: INTERNAL MEDICINE

## 2018-06-27 PROCEDURE — 99214 OFFICE O/P EST MOD 30 MIN: CPT | Performed by: INTERNAL MEDICINE

## 2018-06-27 PROCEDURE — G8427 DOCREV CUR MEDS BY ELIG CLIN: HCPCS | Performed by: INTERNAL MEDICINE

## 2018-06-27 PROCEDURE — 1123F ACP DISCUSS/DSCN MKR DOCD: CPT | Performed by: INTERNAL MEDICINE

## 2018-07-01 ENCOUNTER — HOSPITAL ENCOUNTER (OUTPATIENT)
Dept: OTHER | Age: 68
Discharge: HOME | End: 2018-07-01
Attending: INTERNAL MEDICINE | Admitting: INTERNAL MEDICINE

## 2018-07-16 ENCOUNTER — TELEPHONE (OUTPATIENT)
Dept: CARDIOLOGY CLINIC | Age: 68
End: 2018-07-16

## 2018-07-16 RX ORDER — WARFARIN SODIUM 5 MG/1
TABLET ORAL
Qty: 90 TABLET | Refills: 2 | Status: SHIPPED | OUTPATIENT
Start: 2018-07-16 | End: 2019-02-18 | Stop reason: SDUPTHER

## 2018-07-16 RX ORDER — SOTALOL HYDROCHLORIDE 80 MG/1
TABLET ORAL
Qty: 60 TABLET | Refills: 5 | Status: SHIPPED | OUTPATIENT
Start: 2018-07-16 | End: 2019-01-04 | Stop reason: SDUPTHER

## 2018-07-16 NOTE — TELEPHONE ENCOUNTER
PT only has 2 days left of both these medications and unfortunately PCP is no longer in practice.   Pt asking if DDW will refill:  Please call pt once RXS have been sent/refused etc...      sotalol (BETAPACE) 80 MG tablet [Pharmacy Med Name: SOTALOL 80 MG TABLET] 60 tablet 4 7/16/2018    Sig:  TAKE ONE TABLET BY MOUTH TWICE A DAY   Class:  Normal   RENETTA:  No       Medication   warfarin (COUMADIN) 5 MG tablet [[id number]]   warfarin (COUMADIN) 5 MG tablet [[id number]]   Order Details   Dose, Route, Frequency: As Directed    Dispense Quantity:  90 tablet Refills:  2 Fills remaining:  --           Sig: TAKE ONE-HALF TABLET BY MOUTH ON MONDAY AND FRIDAY, TAKE ONE TABLET ON ALL OTHER DAYS                 [address], [place] - [place] -  [phone]

## 2018-08-01 ENCOUNTER — ANTI-COAG VISIT (OUTPATIENT)
Dept: PHARMACY | Age: 68
End: 2018-08-01
Payer: MEDICARE

## 2018-08-01 DIAGNOSIS — Z79.01 LONG TERM CURRENT USE OF ANTICOAGULANT THERAPY: ICD-10-CM

## 2018-08-01 DIAGNOSIS — I48.0 PAROXYSMAL ATRIAL FIBRILLATION (HCC): ICD-10-CM

## 2018-08-01 LAB — INTERNATIONAL NORMALIZATION RATIO, POC: 1.8

## 2018-08-01 PROCEDURE — 85610 PROTHROMBIN TIME: CPT

## 2018-08-01 PROCEDURE — 99211 OFF/OP EST MAY X REQ PHY/QHP: CPT

## 2018-08-01 NOTE — PROGRESS NOTES
ANTICOAGULATION SERVICE    Sabina Ogden is a 70 y.o. male with PMHx significant for Afib, internal hemorrhoids, DVT, lymphoma, subdural hematoma who presents to clinic 8/1/2018 for anticoagulation monitoring and adjustment.     Anticoagulation Indication(s):  Afib + h/o DVT  Referring Physician:  Dr. Ruben Latham  Goal INR Range:  2-3  Duration of Anticoagulation Therapy:  Indefinite  Time of day dose taken:  PM  Product patient has at home:  warfarin 5 mg    Lab Results   Component Value Date    RBC 5.60 06/09/2017    HGB 18.2 (H) 06/09/2017    HCT 55.7 (H) 06/09/2017    MCV 99.5 (H) 06/09/2017    MCH 32.5 (H) 06/09/2017    MPV 8.2 05/03/2011    RDW 13.4 06/09/2017     06/09/2017     INR Summary                           Warfarin regimen (mg)  Date INR   A/P   Sun Mon Tue Wed Thu Fri Sat Mg/wk  8/1 1.8 Below goal, continue 5 2.5 5 5 5 2.5 5 30  6/27 2.3 At goal, no change 5 2.5 5 5 5 2.5 5 30  6/1 2.5 At goal, no change 5 2.5 5 5 5 2.5 5 30  5/2 3.1 Above goal, continue 5 2.5 5 5 5 2.5 5 30  4/4 2.2 At goal, no change 5 2.5 5 5 5 2.5 5 30  3/14 1.9 Below goal, continue 5 2.5 5 5 5 2.5 5 30  2/9 2.2 At goal, no change 5 2.5 5 5 5 2.5 5 30  1/11 2.0 At goal, no change 5 2.5 5 5 5 2.5 5 30  12/13 2.1 At goal, no change 5 2.5 5 5 5 2.5 5 30  11/15 1.7 Below goal, continue 5 2.5 5 5 5 2.5 5 30  10/11 2.0 At goal, no change 5 2.5 5 5 5 2.5 5 30  9/12 1.8 Below goal, continue 5 2.5 5 5 5 2.5 5 30  8/9 2.2 At goal, no change 5 2.5 5 5 5 2.5 5 30  7/14 2.1 At goal, no change 5 2.5 5 5 5 2.5 5 30  6/9 3.0 At goal, no change 5 2.5 5 5 5 2.5 5 30  5/10 2.4 At goal, no change 5 2.5 5 5 5 2.5 5 30  4/13 2.4 At goal, no change 5 2.5 5 5 5 2.5 5 30    Patient History:   Recent hospitalizations/HC visits 6/27 OV Dr Ruben Latham- no medication changes    Recent medication changes None reported   Medications taken regularly that may interact with warfarin or alter INR No significant drug interactions identified   Warfarin dose taken as prescribed Yes; uses pillbox   Signs/symptoms of bleeding None reported    Vitamin K intake -Normally has ~3-4 servings/week of broccoli/salads (no kale)  -Patient eats 2/3 can of spinach 3x/wk (Tu/Th/F)- will decrease spinach intake to 2x wk    Recent vomiting/diarrhea/fever, changes in weight or activity level None reported   Tobacco or alcohol use Denies use of either   Upcoming surgeries or procedures None reported     Assessment/Plan:   Patient's INR was subtherapeutic today (1.8) likely due to increased intake of vitamin K. Patient prefers not to adjust warfarin dose, and to adjust vitamin K intake instead. States he would like to reduce spinach intake and keep repeat INR in 4 weeks, which seems reasonable given his stable history on this dose. Patient was instructed to continue warfarin regimen of 2.5 mg on M/F and 5 mg all other days. Repeat INR in 4 weeks. Patient was reminded to maintain consistent vitamin K intake and call with any bleeding, medication changes, or fever/vomiting/diarrhea. Patient understands dosing directions and information discussed. Dosing schedule and follow up appointment given to patient. Progress note routed to referring physician's office. Patient acknowledges working in consult agreement with pharmacist as referred by his/her physician. [facility]  [facility]     Please call Bin at [phone] with any questions. Thanks!   Eric Amin, JustynD, BCPS  [facility]   [facility] Ph: [phone]  [facility] Ph: [phone]  8/1/2018 10:30 AM

## 2018-08-30 ENCOUNTER — ANTI-COAG VISIT (OUTPATIENT)
Dept: PHARMACY | Age: 68
End: 2018-08-30
Payer: MEDICARE

## 2018-08-30 DIAGNOSIS — Z79.01 LONG TERM CURRENT USE OF ANTICOAGULANT THERAPY: ICD-10-CM

## 2018-08-30 DIAGNOSIS — I48.0 PAROXYSMAL ATRIAL FIBRILLATION (HCC): ICD-10-CM

## 2018-08-30 LAB — INTERNATIONAL NORMALIZATION RATIO, POC: 2

## 2018-08-30 PROCEDURE — 99211 OFF/OP EST MAY X REQ PHY/QHP: CPT

## 2018-08-30 PROCEDURE — 85610 PROTHROMBIN TIME: CPT

## 2018-09-27 ENCOUNTER — ANTI-COAG VISIT (OUTPATIENT)
Dept: PHARMACY | Age: 68
End: 2018-09-27
Payer: MEDICARE

## 2018-09-27 DIAGNOSIS — Z79.01 LONG TERM CURRENT USE OF ANTICOAGULANT THERAPY: ICD-10-CM

## 2018-09-27 DIAGNOSIS — I48.0 PAROXYSMAL ATRIAL FIBRILLATION (HCC): ICD-10-CM

## 2018-09-27 LAB — INR BLD: 2.5

## 2018-09-27 PROCEDURE — 99211 OFF/OP EST MAY X REQ PHY/QHP: CPT

## 2018-09-27 PROCEDURE — 85610 PROTHROMBIN TIME: CPT

## 2018-09-27 NOTE — PROGRESS NOTES
ANTICOAGULATION SERVICE    Mi Arguelles is a 70 y.o. male with PMHx significant for Afib, internal hemorrhoids, DVT, lymphoma, subdural hematoma who presents to clinic 9/27/2018 for anticoagulation monitoring and adjustment.     Anticoagulation Indication(s):  Afib + h/o DVT  Referring Physician:  Dr. Melvina Hewitt  Goal INR Range:  2-3  Duration of Anticoagulation Therapy:  Indefinite  Time of day dose taken:  PM  Product patient has at home:  warfarin 5 mg    Lab Results   Component Value Date    RBC 5.60 06/09/2017    HGB 18.2 (H) 06/09/2017    HCT 55.7 (H) 06/09/2017    MCV 99.5 (H) 06/09/2017    MCH 32.5 (H) 06/09/2017    MPV 8.2 05/03/2011    RDW 13.4 06/09/2017     06/09/2017     INR Summary                           Warfarin regimen (mg)  Date INR   A/P   Sun Mon Tue Wed Thu Fri Sat Mg/wk  9/27 2.5 At goal, no change 5 2.5 5 5 5 2.5 5 30  8/30 2.0 At goal, no change 5 2.5 5 5 5 2.5 5 30  8/1 1.8 Below goal, continue 5 2.5 5 5 5 2.5 5 30  6/27 2.3 At goal, no change 5 2.5 5 5 5 2.5 5 30  6/1 2.5 At goal, no change 5 2.5 5 5 5 2.5 5 30  5/2 3.1 Above goal, continue 5 2.5 5 5 5 2.5 5 30  4/4 2.2 At goal, no change 5 2.5 5 5 5 2.5 5 30  3/14 1.9 Below goal, continue 5 2.5 5 5 5 2.5 5 30  2/9 2.2 At goal, no change 5 2.5 5 5 5 2.5 5 30  1/11 2.0 At goal, no change 5 2.5 5 5 5 2.5 5 30  12/13 2.1 At goal, no change 5 2.5 5 5 5 2.5 5 30  11/15 1.7 Below goal, continue 5 2.5 5 5 5 2.5 5 30  10/11 2.0 At goal, no change 5 2.5 5 5 5 2.5 5 30  9/12 1.8 Below goal, continue 5 2.5 5 5 5 2.5 5 30  8/9 2.2 At goal, no change 5 2.5 5 5 5 2.5 5 30  7/14 2.1 At goal, no change 5 2.5 5 5 5 2.5 5 30  6/9 3.0 At goal, no change 5 2.5 5 5 5 2.5 5 30  5/10 2.4 At goal, no change 5 2.5 5 5 5 2.5 5 30  4/13 2.4 At goal, no change 5 2.5 5 5 5 2.5 5 30    Patient History:   Recent hospitalizations/HC visits 8/7 OV [facility]  6/27 OV Dr Melvina Hewitt- no medication changes    Recent medication changes None reported   Medications taken regularly that may interact with warfarin or alter INR No significant drug interactions identified   Warfarin dose taken as prescribed Yes; uses pillbox   Signs/symptoms of bleeding None reported    Vitamin K intake -Normally has ~3-4 servings/week of broccoli/salads (no kale)  -Patient eats 2/3 can of spinach ~2x/wk    Recent vomiting/diarrhea/fever, changes in weight or activity level None reported   Tobacco or alcohol use Denies use of either   Upcoming surgeries or procedures None reported     Assessment/Plan:   Patient's INR was therapeutic today (2.5). Patient prefers not to adjust warfarin dose, and to adjust vitamin K intake instead. Patient was instructed to continue warfarin regimen of 2.5 mg on M/F and 5 mg all other days. Repeat INR in 4.5 weeks. Patient was reminded to maintain consistent vitamin K intake and call with any bleeding, medication changes, or fever/vomiting/diarrhea. Patient understands dosing directions and information discussed. Dosing schedule and follow up appointment given to patient. Progress note routed to referring physician's office. Patient acknowledges working in consult agreement with pharmacist as referred by his/her physician. Next Clinic Appointment:   10/31 @ [facility]     Please call Bin at [phone] with any questions. Thanks!   Waylon Ordoñez  PharmD Candidate [address] of [facility]

## 2018-10-31 ENCOUNTER — ANTI-COAG VISIT (OUTPATIENT)
Dept: PHARMACY | Age: 68
End: 2018-10-31
Payer: MEDICARE

## 2018-10-31 ENCOUNTER — OFFICE VISIT (OUTPATIENT)
Dept: CARDIOLOGY CLINIC | Age: 68
End: 2018-10-31
Payer: MEDICARE

## 2018-10-31 VITALS
SYSTOLIC BLOOD PRESSURE: 131 MMHG | BODY MASS INDEX: 28.05 KG/M2 | DIASTOLIC BLOOD PRESSURE: 84 MMHG | HEART RATE: 76 BPM | WEIGHT: 212.6 LBS

## 2018-10-31 DIAGNOSIS — I48.0 PAROXYSMAL ATRIAL FIBRILLATION (HCC): Primary | ICD-10-CM

## 2018-10-31 DIAGNOSIS — I48.0 PAROXYSMAL ATRIAL FIBRILLATION (HCC): ICD-10-CM

## 2018-10-31 DIAGNOSIS — I34.0 NON-RHEUMATIC MITRAL REGURGITATION: ICD-10-CM

## 2018-10-31 DIAGNOSIS — Z79.01 LONG TERM CURRENT USE OF ANTICOAGULANT THERAPY: ICD-10-CM

## 2018-10-31 DIAGNOSIS — R00.2 PALPITATIONS: ICD-10-CM

## 2018-10-31 LAB — INTERNATIONAL NORMALIZATION RATIO, POC: 2.1

## 2018-10-31 PROCEDURE — 1101F PT FALLS ASSESS-DOCD LE1/YR: CPT | Performed by: INTERNAL MEDICINE

## 2018-10-31 PROCEDURE — G8427 DOCREV CUR MEDS BY ELIG CLIN: HCPCS | Performed by: INTERNAL MEDICINE

## 2018-10-31 PROCEDURE — G8417 CALC BMI ABV UP PARAM F/U: HCPCS | Performed by: INTERNAL MEDICINE

## 2018-10-31 PROCEDURE — 1036F TOBACCO NON-USER: CPT | Performed by: INTERNAL MEDICINE

## 2018-10-31 PROCEDURE — 99214 OFFICE O/P EST MOD 30 MIN: CPT | Performed by: INTERNAL MEDICINE

## 2018-10-31 PROCEDURE — 3017F COLORECTAL CA SCREEN DOC REV: CPT | Performed by: INTERNAL MEDICINE

## 2018-10-31 PROCEDURE — 99211 OFF/OP EST MAY X REQ PHY/QHP: CPT

## 2018-10-31 PROCEDURE — 85610 PROTHROMBIN TIME: CPT

## 2018-10-31 PROCEDURE — 1123F ACP DISCUSS/DSCN MKR DOCD: CPT | Performed by: INTERNAL MEDICINE

## 2018-10-31 PROCEDURE — 4040F PNEUMOC VAC/ADMIN/RCVD: CPT | Performed by: INTERNAL MEDICINE

## 2018-10-31 PROCEDURE — G8484 FLU IMMUNIZE NO ADMIN: HCPCS | Performed by: INTERNAL MEDICINE

## 2018-10-31 NOTE — PROGRESS NOTES
changes None reported   Medications taken regularly that may interact with warfarin or alter INR No significant drug interactions identified   Warfarin dose taken as prescribed Yes; uses pillbox   Signs/symptoms of bleeding None reported    Vitamin K intake -Normally has ~3-4 servings/week of broccoli/salads (no kale)  -Patient eats 2/3 can of spinach ~2x/wk    Recent vomiting/diarrhea/fever, changes in weight or activity level None reported   Tobacco or alcohol use Denies use of either   Upcoming surgeries or procedures None reported     Assessment/Plan:   Patient's INR was therapeutic today (2.1). Patient prefers not to adjust warfarin dose, and to adjust vitamin K intake instead. Patient was instructed to continue warfarin regimen of 2.5 mg on M/F and 5 mg all other days. Repeat INR in 6 weeks when patient needs port flushed. Patient was reminded to maintain consistent vitamin K intake and call with any bleeding, medication changes, or fever/vomiting/diarrhea. Patient understands dosing directions and information discussed. Dosing schedule and follow up appointment given to patient. Progress note routed to referring physician's office. Patient acknowledges working in consult agreement with pharmacist as referred by his/her physician. Next Clinic Appointment:   12/12    Please call Bin at [phone] with any questions.      Ankur Varma, PharmD   PGY1 Pharmacy Resident   [facility]   [address] Ph: [phone]  [facility] Ph: [phone]  10/31/2018 1:00 PM

## 2018-10-31 NOTE — PROGRESS NOTES
Subjective:      Patient ID: Christiane Adler is a 70 y.o. male. HPI Tavo Swenson is here today for a 4 month follow up afib/palp/mr. No complaints. Continues exercising. Duane Bourdon Rhythm good. No sob. No edema. No pnd or orthopnea. No syncope. No syncope. Wt down. Past Medical History:   Diagnosis Date    AF (atrial fibrillation) (HCC)     Aortic valve disease     leaking no problems    Lymphoma [facility])      Past Surgical History:   Procedure Laterality Date    APPENDECTOMY      COLONOSCOPY  8/18/2006     Dr. Jose Leigh - internal hemorrhoids , hyperplastic polyp     CYSTOSCOPY  1-11-10     Dr. Jag Cardenas ureteral stent     LYMPH NODE BIOPSY  5/11/11    RIGHT CERVICAL    LYMPH NODE DISSECTION N/A 2011    TONSILLECTOMY           Allergies   Allergen Reactions    Gentamicin Other (See Comments)     Dad had reaction        Social History     Social History    Marital status:      Spouse name: N/A    Number of children: 0    Years of education: N/A     Occupational History    retired      Social History Main Topics    Smoking status: Never Smoker    Smokeless tobacco: Never Used    Alcohol use No    Drug use: No    Sexual activity: Yes     Partners: Female     Other Topics Concern    Not on file     Social History Narrative    No narrative on file        Patient has a family history includes Heart Disease in his mother. Patient  has a past medical history of AF (atrial fibrillation) ([address] Aortic valve disease; and Lymphoma ([facility] [address] Current Outpatient Prescriptions   Medication Sig Dispense Refill    warfarin (COUMADIN) 5 MG tablet TAKE ONE-HALF TABLET BY MOUTH ON MONDAY AND FRIDAY, TAKE ONE TABLET ON ALL OTHER DAYS 90 tablet 2    sotalol (BETAPACE) 80 MG tablet TAKE ONE TABLET BY MOUTH TWICE A DAY 60 tablet 5    ranitidine (ZANTAC) 150 MG tablet TAKE ONE TABLET BY MOUTH TWICE A  tablet 0    mometasone (ELOCON) 0.1 % cream Apply topically 2 times daily as needed.  30 g 1    amoxicillin (AMOXIL) 500 MG capsule Take 4 tablets by oral route 1 hour prior to the dental procedure. 4 capsule 1    clotrimazole-betamethasone (LOTRISONE) cream Apply topically 2 times daily. 15 g 1     No current facility-administered medications for this visit. Vitals:    10/31/18 1317   BP: 131/84   Pulse: 76       Weight: 212 lb 9.6 oz (96.4 kg)      Review of Systems   Constitutional: Negative for activity change and fatigue. Respiratory: Negative for apnea, cough, choking, chest tightness and shortness of breath. Cardiovascular: Negative for chest pain, palpitations and leg swelling. No PND or orthopnea. No tachycardia. Gastrointestinal: Negative for abdominal distention. Musculoskeletal: Negative for myalgias. Neurological: Negative for dizziness, syncope and light-headedness. Psychiatric/Behavioral: Negative for behavioral problems, confusion and agitation. Other systems reviewed negative as done. Objective:   Physical Exam   Constitutional: He is oriented to person, place, and time. He appears well-developed and well-nourished. No distress. HENT:   Head: Normocephalic and atraumatic. Eyes: Conjunctivae and EOM are normal. Right eye exhibits no discharge. Left eye exhibits no discharge. Neck: Normal range of motion. Neck supple. No JVD present. Cardiovascular:  Reg Rate rhythm, S1 normal, S2 normal and normal heart sounds. Exam reveals no gallop. Pulses:       Radial pulses are 2+ on the right side, and 2+ on the left side. Pulmonary/Chest: Effort normal and breath sounds normal. No respiratory distress. He has no wheezes. He has no rales. Abdominal: Soft. Bowel sounds are normal. No tenderness. Musculoskeletal: Normal range of motion. He exhibits no edema. Tr R  Neurological: He is alert and oriented to person, place, and time. Skin: Skin is warm and dry. Psychiatric: He has a normal mood and affect.  His behavior is normal. Thought content normal. Assessment:       Diagnosis Orders   1. Paroxysmal atrial fibrillation (HCC)     2. Palpitations     3. Non-rheumatic mitral regurgitation             Plan:      CV stable. Active. Rhythm stable. HR stable. Continues exercising. On AC. No bleeding issues. Remains compensated. No changes. Reviewed previous records and testing including echo 3/15. Continue to monitor. Follow up 4 months.

## 2018-12-17 ENCOUNTER — ANTI-COAG VISIT (OUTPATIENT)
Dept: PHARMACY | Age: 68
End: 2018-12-17
Payer: MEDICARE

## 2018-12-17 DIAGNOSIS — Z79.01 LONG TERM CURRENT USE OF ANTICOAGULANT THERAPY: ICD-10-CM

## 2018-12-17 DIAGNOSIS — I48.0 PAROXYSMAL ATRIAL FIBRILLATION (HCC): ICD-10-CM

## 2018-12-17 LAB — INTERNATIONAL NORMALIZATION RATIO, POC: 2.2

## 2018-12-17 PROCEDURE — 99211 OFF/OP EST MAY X REQ PHY/QHP: CPT

## 2018-12-17 PROCEDURE — 85610 PROTHROMBIN TIME: CPT

## 2018-12-19 ENCOUNTER — OFFICE VISIT (OUTPATIENT)
Dept: PULMONOLOGY | Age: 68
End: 2018-12-19
Payer: MEDICARE

## 2018-12-19 VITALS
DIASTOLIC BLOOD PRESSURE: 78 MMHG | HEIGHT: 74 IN | HEART RATE: 59 BPM | OXYGEN SATURATION: 99 % | SYSTOLIC BLOOD PRESSURE: 126 MMHG | WEIGHT: 216.2 LBS | BODY MASS INDEX: 27.75 KG/M2

## 2018-12-19 DIAGNOSIS — I48.0 PAROXYSMAL ATRIAL FIBRILLATION (HCC): Chronic | ICD-10-CM

## 2018-12-19 DIAGNOSIS — K21.9 GASTROESOPHAGEAL REFLUX DISEASE, ESOPHAGITIS PRESENCE NOT SPECIFIED: ICD-10-CM

## 2018-12-19 DIAGNOSIS — G47.33 OBSTRUCTIVE SLEEP APNEA: Primary | ICD-10-CM

## 2018-12-19 PROCEDURE — 4040F PNEUMOC VAC/ADMIN/RCVD: CPT | Performed by: NURSE PRACTITIONER

## 2018-12-19 PROCEDURE — 3017F COLORECTAL CA SCREEN DOC REV: CPT | Performed by: NURSE PRACTITIONER

## 2018-12-19 PROCEDURE — 1123F ACP DISCUSS/DSCN MKR DOCD: CPT | Performed by: NURSE PRACTITIONER

## 2018-12-19 PROCEDURE — 1101F PT FALLS ASSESS-DOCD LE1/YR: CPT | Performed by: NURSE PRACTITIONER

## 2018-12-19 PROCEDURE — G8427 DOCREV CUR MEDS BY ELIG CLIN: HCPCS | Performed by: NURSE PRACTITIONER

## 2018-12-19 PROCEDURE — G8484 FLU IMMUNIZE NO ADMIN: HCPCS | Performed by: NURSE PRACTITIONER

## 2018-12-19 PROCEDURE — G8417 CALC BMI ABV UP PARAM F/U: HCPCS | Performed by: NURSE PRACTITIONER

## 2018-12-19 PROCEDURE — 1036F TOBACCO NON-USER: CPT | Performed by: NURSE PRACTITIONER

## 2018-12-19 PROCEDURE — 99213 OFFICE O/P EST LOW 20 MIN: CPT | Performed by: NURSE PRACTITIONER

## 2018-12-19 ASSESSMENT — SLEEP AND FATIGUE QUESTIONNAIRES
HOW LIKELY ARE YOU TO NOD OFF OR FALL ASLEEP WHEN YOU ARE A PASSENGER IN A CAR FOR AN HOUR WITHOUT A BREAK: 0
HOW LIKELY ARE YOU TO NOD OFF OR FALL ASLEEP WHILE SITTING AND READING: 0
HOW LIKELY ARE YOU TO NOD OFF OR FALL ASLEEP WHILE WATCHING TV: 1
HOW LIKELY ARE YOU TO NOD OFF OR FALL ASLEEP WHILE SITTING INACTIVE IN A PUBLIC PLACE: 0
HOW LIKELY ARE YOU TO NOD OFF OR FALL ASLEEP IN A CAR, WHILE STOPPED FOR A FEW MINUTES IN TRAFFIC: 0
HOW LIKELY ARE YOU TO NOD OFF OR FALL ASLEEP WHILE LYING DOWN TO REST IN THE AFTERNOON WHEN CIRCUMSTANCES PERMIT: 1
ESS TOTAL SCORE: 2
HOW LIKELY ARE YOU TO NOD OFF OR FALL ASLEEP WHILE SITTING QUIETLY AFTER LUNCH WITHOUT ALCOHOL: 0
HOW LIKELY ARE YOU TO NOD OFF OR FALL ASLEEP WHILE SITTING AND TALKING TO SOMEONE: 0

## 2018-12-19 ASSESSMENT — ENCOUNTER SYMPTOMS
RHINORRHEA: 0
SINUS PRESSURE: 0
SHORTNESS OF BREATH: 0
EYE REDNESS: 0
EYE PAIN: 0
ABDOMINAL PAIN: 0
COUGH: 0
APNEA: 0
ABDOMINAL DISTENTION: 0

## 2019-01-07 RX ORDER — SOTALOL HYDROCHLORIDE 80 MG/1
TABLET ORAL
Qty: 180 TABLET | Refills: 3 | Status: SHIPPED | OUTPATIENT
Start: 2019-01-07 | End: 2019-01-10 | Stop reason: SDUPTHER

## 2019-01-10 RX ORDER — SOTALOL HYDROCHLORIDE 80 MG/1
TABLET ORAL
Qty: 180 TABLET | Refills: 5 | Status: SHIPPED | OUTPATIENT
Start: 2019-01-10 | End: 2019-12-20

## 2019-02-04 ENCOUNTER — ANTI-COAG VISIT (OUTPATIENT)
Dept: PHARMACY | Age: 69
End: 2019-02-04
Payer: MEDICARE

## 2019-02-04 DIAGNOSIS — Z79.01 LONG TERM CURRENT USE OF ANTICOAGULANT THERAPY: ICD-10-CM

## 2019-02-04 DIAGNOSIS — I48.0 PAROXYSMAL ATRIAL FIBRILLATION (HCC): ICD-10-CM

## 2019-02-04 LAB — INTERNATIONAL NORMALIZATION RATIO, POC: 2.2

## 2019-02-04 PROCEDURE — 85610 PROTHROMBIN TIME: CPT

## 2019-02-04 PROCEDURE — 99211 OFF/OP EST MAY X REQ PHY/QHP: CPT

## 2019-02-18 ENCOUNTER — TELEPHONE (OUTPATIENT)
Dept: CARDIOLOGY CLINIC | Age: 69
End: 2019-02-18

## 2019-02-19 ENCOUNTER — TELEPHONE (OUTPATIENT)
Dept: CARDIOLOGY CLINIC | Age: 69
End: 2019-02-19

## 2019-02-19 RX ORDER — WARFARIN SODIUM 5 MG/1
TABLET ORAL
Qty: 90 TABLET | Refills: 3 | Status: SHIPPED | OUTPATIENT
Start: 2019-02-19 | End: 2019-12-20

## 2019-03-01 ENCOUNTER — OFFICE VISIT (OUTPATIENT)
Dept: CARDIOLOGY CLINIC | Age: 69
End: 2019-03-01
Payer: MEDICARE

## 2019-03-01 ENCOUNTER — ANTI-COAG VISIT (OUTPATIENT)
Dept: PHARMACY | Age: 69
End: 2019-03-01
Payer: MEDICARE

## 2019-03-01 VITALS
WEIGHT: 216 LBS | SYSTOLIC BLOOD PRESSURE: 130 MMHG | DIASTOLIC BLOOD PRESSURE: 80 MMHG | HEART RATE: 68 BPM | BODY MASS INDEX: 27.73 KG/M2

## 2019-03-01 DIAGNOSIS — I48.0 PAROXYSMAL ATRIAL FIBRILLATION (HCC): Primary | ICD-10-CM

## 2019-03-01 DIAGNOSIS — I34.0 NON-RHEUMATIC MITRAL REGURGITATION: ICD-10-CM

## 2019-03-01 DIAGNOSIS — R00.2 PALPITATIONS: ICD-10-CM

## 2019-03-01 LAB — INTERNATIONAL NORMALIZATION RATIO, POC: 2.7

## 2019-03-01 PROCEDURE — 99211 OFF/OP EST MAY X REQ PHY/QHP: CPT | Performed by: PHARMACIST

## 2019-03-01 PROCEDURE — 1101F PT FALLS ASSESS-DOCD LE1/YR: CPT | Performed by: INTERNAL MEDICINE

## 2019-03-01 PROCEDURE — 3017F COLORECTAL CA SCREEN DOC REV: CPT | Performed by: INTERNAL MEDICINE

## 2019-03-01 PROCEDURE — G8417 CALC BMI ABV UP PARAM F/U: HCPCS | Performed by: INTERNAL MEDICINE

## 2019-03-01 PROCEDURE — 1036F TOBACCO NON-USER: CPT | Performed by: INTERNAL MEDICINE

## 2019-03-01 PROCEDURE — 85610 PROTHROMBIN TIME: CPT | Performed by: PHARMACIST

## 2019-03-01 PROCEDURE — 1123F ACP DISCUSS/DSCN MKR DOCD: CPT | Performed by: INTERNAL MEDICINE

## 2019-03-01 PROCEDURE — G8484 FLU IMMUNIZE NO ADMIN: HCPCS | Performed by: INTERNAL MEDICINE

## 2019-03-01 PROCEDURE — 4040F PNEUMOC VAC/ADMIN/RCVD: CPT | Performed by: INTERNAL MEDICINE

## 2019-03-01 PROCEDURE — 99214 OFFICE O/P EST MOD 30 MIN: CPT | Performed by: INTERNAL MEDICINE

## 2019-03-01 PROCEDURE — G8427 DOCREV CUR MEDS BY ELIG CLIN: HCPCS | Performed by: INTERNAL MEDICINE

## 2019-04-01 ENCOUNTER — ANTI-COAG VISIT (OUTPATIENT)
Dept: PHARMACY | Age: 69
End: 2019-04-01
Payer: MEDICARE

## 2019-04-01 DIAGNOSIS — Z79.01 LONG TERM CURRENT USE OF ANTICOAGULANT THERAPY: ICD-10-CM

## 2019-04-01 DIAGNOSIS — I48.0 PAROXYSMAL ATRIAL FIBRILLATION (HCC): ICD-10-CM

## 2019-04-01 LAB — INTERNATIONAL NORMALIZATION RATIO, POC: 1.9

## 2019-04-01 PROCEDURE — 85610 PROTHROMBIN TIME: CPT

## 2019-04-01 PROCEDURE — 99211 OFF/OP EST MAY X REQ PHY/QHP: CPT

## 2019-04-01 NOTE — PROGRESS NOTES
ANTICOAGULATION SERVICE    Argelia Lopez is a 70 y.o. male with PMHx significant for A-fib, internal hemorrhoids, DVT, lymphoma, subdural hematoma who presents to clinic 4/1/2019 for anticoagulation monitoring and adjustment.     Anticoagulation Indication(s):  Afib + h/o DVT  Referring Physician:  Dr. Roman Center  Goal INR Range:  2-3  Duration of Anticoagulation Therapy:  Indefinite  Time of day dose taken:  PM  Product patient has at home:  warfarin 5 mg    Lab Results   Component Value Date    RBC 5.60 06/09/2017    HGB 18.2 (H) 06/09/2017    HCT 55.7 (H) 06/09/2017    MCV 99.5 (H) 06/09/2017    MCH 32.5 (H) 06/09/2017    MPV 8.2 05/03/2011    RDW 13.4 06/09/2017     06/09/2017     INR Summary                           Warfarin regimen (mg)  Date INR   A/P   Sun Mon Tue Wed Thu Fri Sat Mg/wk  4/1 1.9 Below goal, continue 5 2.5 5 5 5 2.5 5 30   3/1 2.7 At goal, no change 5 2.5 5 5 5 2.5 5 30  2/4 2.2 At goal, no change 5 2.5 5 5 5 2.5 5 30  12/17 2.2 At goal, no change 5 2.5 5 5 5 2.5 5 30  10/31 2.1 At goal, no change 5 2.5 5 5 5 2.5 5 30  9/27 2.5 At goal, no change 5 2.5 5 5 5 2.5 5 30  8/30 2.0 At goal, no change 5 2.5 5 5 5 2.5 5 30  8/1 1.8 Below goal, continue 5 2.5 5 5 5 2.5 5 30  6/27 2.3 At goal, no change 5 2.5 5 5 5 2.5 5 30  6/1 2.5 At goal, no change 5 2.5 5 5 5 2.5 5 30  5/2 3.1 Above goal, continue 5 2.5 5 5 5 2.5 5 30  4/4 2.2 At goal, no change 5 2.5 5 5 5 2.5 5 30  3/14 1.9 Below goal, continue 5 2.5 5 5 5 2.5 5 30  2/9 2.2 At goal, no change 5 2.5 5 5 5 2.5 5 30  1/11 2.0 At goal, no change 5 2.5 5 5 5 2.5 5 30  12/13 2.1 At goal, no change 5 2.5 5 5 5 2.5 5 30  11/15 1.7 Below goal, continue 5 2.5 5 5 5 2.5 5 30  10/11 2.0 At goal, no change 5 2.5 5 5 5 2.5 5 30  9/12 1.8 Below goal, continue 5 2.5 5 5 5 2.5 5 30  8/9 2.2 At goal, no change 5 2.5 5 5 5 2.5 5 30  7/14 2.1 At goal, no change 5 2.5 5 5 5 2.5 5 30  6/9 3.0 At goal, no change 5 2.5 5 5 5 2.5 5 30  5/10 2.4 At goal, no change 5 2.5 5 5 5 2.5 5 30  4/13 2.4 At goal, no change 5 2.5 5 5 5 2.5 5 30    Patient History:   Recent hospitalizations/HC visits 3/1 Dr. Agata Cornejo: no med changes    Recent medication changes None reported   Medications taken regularly that may interact with warfarin or alter INR No significant drug interactions identified   Warfarin dose taken as prescribed Yes; uses pillbox   Signs/symptoms of bleeding None reported    Vitamin K intake -Normally has ~3-4 servings/week of broccoli/salads (no kale)  -Patient eats 2/3 can of spinach ~2x/wk    Recent vomiting/diarrhea/fever, changes in weight or activity level None reported   Tobacco or alcohol use Denies use of either   Upcoming surgeries or procedures None reported     Assessment/Plan:   Patient's INR was slightly subtherapeutic today (1.9). Patient prefers not to adjust warfarin dose, and to adjust vitamin K intake instead. Patient was instructed to continue stable warfarin regimen of 2.5 mg on Mon+Fri and 5 mg all other days. Repeat INR in 4 weeks. Patient was reminded to maintain consistent vitamin K intake and call with any bleeding, medication changes, or fever/vomiting/diarrhea. Patient understands dosing directions and information discussed. Dosing schedule and follow up appointment given to patient. Progress note routed to referring physician's office. Patient acknowledges working in consult agreement with pharmacist as referred by his/her physician. Next Clinic Appointment:  5/2    Please call Bin at [phone] with any questions. Thanks! Arleene Appl. Gattis Dakins, Dorothy  [address] [facility]  Ph: [phone]  4/1/2019 10:28 AM

## 2019-05-07 ENCOUNTER — ANTI-COAG VISIT (OUTPATIENT)
Dept: PHARMACY | Age: 69
End: 2019-05-07
Payer: MEDICARE

## 2019-05-07 DIAGNOSIS — Z79.01 LONG TERM CURRENT USE OF ANTICOAGULANT THERAPY: ICD-10-CM

## 2019-05-07 DIAGNOSIS — I48.0 PAROXYSMAL ATRIAL FIBRILLATION (HCC): ICD-10-CM

## 2019-05-07 LAB — INTERNATIONAL NORMALIZATION RATIO, POC: 3.1

## 2019-05-07 PROCEDURE — 99211 OFF/OP EST MAY X REQ PHY/QHP: CPT

## 2019-05-07 PROCEDURE — 85610 PROTHROMBIN TIME: CPT

## 2019-05-07 NOTE — PROGRESS NOTES
ANTICOAGULATION SERVICE    Saulo Cody is a 70 y.o. male with PMHx significant for A-fib, internal hemorrhoids, DVT, lymphoma, subdural hematoma who presents to clinic 5/7/2019 for anticoagulation monitoring and adjustment.     Anticoagulation Indication(s):  Afib + h/o DVT  Referring Physician:  Dr. Radha Mcmanus  Goal INR Range:  2-3  Duration of Anticoagulation Therapy:  Indefinite  Time of day dose taken:  PM  Product patient has at home:  warfarin 5 mg    Lab Results   Component Value Date    RBC 5.60 06/09/2017    HGB 18.2 (H) 06/09/2017    HCT 55.7 (H) 06/09/2017    MCV 99.5 (H) 06/09/2017    MCH 32.5 (H) 06/09/2017    MPV 8.2 05/03/2011    RDW 13.4 06/09/2017     06/09/2017     INR Summary                           Warfarin regimen (mg)  Date INR   A/P   Sun Mon Tue Wed Thu Fri Sat Mg/wk  5/7 3.1 Above goal, continue 5 2.5 5 5 5 2.5 5 30   4/1 1.9 Below goal, continue 5 2.5 5 5 5 2.5 5 30   3/1 2.7 At goal, no change 5 2.5 5 5 5 2.5 5 30  2/4 2.2 At goal, no change 5 2.5 5 5 5 2.5 5 30  12/17 2.2 At goal, no change 5 2.5 5 5 5 2.5 5 30  10/31 2.1 At goal, no change 5 2.5 5 5 5 2.5 5 30  9/27 2.5 At goal, no change 5 2.5 5 5 5 2.5 5 30  8/30 2.0 At goal, no change 5 2.5 5 5 5 2.5 5 30  8/1 1.8 Below goal, continue 5 2.5 5 5 5 2.5 5 30  6/27 2.3 At goal, no change 5 2.5 5 5 5 2.5 5 30  6/1 2.5 At goal, no change 5 2.5 5 5 5 2.5 5 30  5/2 3.1 Above goal, continue 5 2.5 5 5 5 2.5 5 30  4/4 2.2 At goal, no change 5 2.5 5 5 5 2.5 5 30  3/14 1.9 Below goal, continue 5 2.5 5 5 5 2.5 5 30  2/9 2.2 At goal, no change 5 2.5 5 5 5 2.5 5 30  1/11 2.0 At goal, no change 5 2.5 5 5 5 2.5 5 30  12/13 2.1 At goal, no change 5 2.5 5 5 5 2.5 5 30  11/15 1.7 Below goal, continue 5 2.5 5 5 5 2.5 5 30  10/11 2.0 At goal, no change 5 2.5 5 5 5 2.5 5 30  9/12 1.8 Below goal, continue 5 2.5 5 5 5 2.5 5 30  8/9 2.2 At goal, no change 5 2.5 5 5 5 2.5 5 30  7/14 2.1 At goal, no change 5 2.5 5 5 5 2.5 5 30  6/9 3.0 At goal, no change 5 2.5 5 5 5 2.5 5 30  5/10 2.4 At goal, no change 5 2.5 5 5 5 2.5 5 30  4/13 2.4 At goal, no change 5 2.5 5 5 5 2.5 5 30    Patient History:   Recent hospitalizations/HC visits 3/1 Dr. Ra Taylor: no med changes    Recent medication changes None reported   Medications taken regularly that may interact with warfarin or alter INR No significant drug interactions identified   Warfarin dose taken as prescribed Yes; uses pillbox   Signs/symptoms of bleeding None reported    Vitamin K intake -Normally has ~3-4 servings/week of broccoli/salads (no kale)  -Patient eats 2/3 can of spinach ~2x/wk   -Reports a decrease in vit K intake over the past few weeks to account for low INR at last visit   Recent vomiting/diarrhea/fever, changes in weight or activity level None reported   Tobacco or alcohol use Denies use of either   Upcoming surgeries or procedures None reported     Assessment/Plan:   Patient's INR was slightly supratherapeutic today (3.1) due to decreased vit K intake. He denies changes to meds, health, or bleeding. Patient prefers not to adjust warfarin dose, and to adjust vitamin K intake instead. Patient was instructed to continue stable warfarin regimen of 2.5 mg on Mon+Fri and 5 mg all other days. Repeat INR in 5 weeks per pt request to coordinate with other [facility]. visit on 6/14. Patient was reminded to maintain consistent vitamin K intake and call with any bleeding, medication changes, or fever/vomiting/diarrhea. Patient understands dosing directions and information discussed. Dosing schedule and follow up appointment given to patient. Progress note routed to referring physician's office. Patient acknowledges working in consult agreement with pharmacist as referred by his/her physician. Next Clinic Appointment:  6/14    Please call Bin at [phone] with any questions. Thanks!   Brionna Doe, PharmD, [facility]  [facility]   [address] Ph: [phone]  [facility] Ph: [phone]  5/7/2019 9:54 AM

## 2019-06-14 ENCOUNTER — ANTI-COAG VISIT (OUTPATIENT)
Dept: PHARMACY | Age: 69
End: 2019-06-14
Payer: MEDICARE

## 2019-06-14 DIAGNOSIS — I48.0 PAROXYSMAL ATRIAL FIBRILLATION (HCC): ICD-10-CM

## 2019-06-14 DIAGNOSIS — Z79.01 LONG TERM CURRENT USE OF ANTICOAGULANT THERAPY: ICD-10-CM

## 2019-06-14 LAB — INR BLD: 2.3

## 2019-06-14 PROCEDURE — 99211 OFF/OP EST MAY X REQ PHY/QHP: CPT

## 2019-06-14 PROCEDURE — 85610 PROTHROMBIN TIME: CPT

## 2019-06-14 NOTE — PROGRESS NOTES
ANTICOAGULATION SERVICE    Christine Salguero is a 70 y.o. male with PMHx significant for A-fib, internal hemorrhoids, DVT, lymphoma, subdural hematoma who presents to clinic 6/14/2019 for anticoagulation monitoring and adjustment.     Anticoagulation Indication(s):  Afib + h/o DVT  Referring Physician:  Dr. Maury Ramirez  Goal INR Range:  2-3  Duration of Anticoagulation Therapy:  Indefinite  Time of day dose taken:  PM  Product patient has at home:  warfarin 5 mg    Lab Results   Component Value Date    RBC 5.60 06/09/2017    HGB 18.2 (H) 06/09/2017    HCT 55.7 (H) 06/09/2017    MCV 99.5 (H) 06/09/2017    MCH 32.5 (H) 06/09/2017    MPV 8.2 05/03/2011    RDW 13.4 06/09/2017     06/09/2017     INR Summary                           Warfarin regimen (mg)  Date INR   A/P   Sun Mon Tue Wed Thu Fri Sat Mg/wk  6/14 2.3 At goal, no change 5 2.5 5 5 5 2.5 5 30  5/7 3.1 Above goal, continue 5 2.5 5 5 5 2.5 5 30   4/1 1.9 Below goal, continue 5 2.5 5 5 5 2.5 5 30   3/1 2.7 At goal, no change 5 2.5 5 5 5 2.5 5 30  2/4 2.2 At goal, no change 5 2.5 5 5 5 2.5 5 30  12/17 2.2 At goal, no change 5 2.5 5 5 5 2.5 5 30  10/31 2.1 At goal, no change 5 2.5 5 5 5 2.5 5 30  9/27 2.5 At goal, no change 5 2.5 5 5 5 2.5 5 30  8/30 2.0 At goal, no change 5 2.5 5 5 5 2.5 5 30  8/1 1.8 Below goal, continue 5 2.5 5 5 5 2.5 5 30  6/27 2.3 At goal, no change 5 2.5 5 5 5 2.5 5 30  6/1 2.5 At goal, no change 5 2.5 5 5 5 2.5 5 30  5/2 3.1 Above goal, continue 5 2.5 5 5 5 2.5 5 30  4/4 2.2 At goal, no change 5 2.5 5 5 5 2.5 5 30  3/14 1.9 Below goal, continue 5 2.5 5 5 5 2.5 5 30  2/9 2.2 At goal, no change 5 2.5 5 5 5 2.5 5 30  1/11 2.0 At goal, no change 5 2.5 5 5 5 2.5 5 30  12/13 2.1 At goal, no change 5 2.5 5 5 5 2.5 5 30  11/15 1.7 Below goal, continue 5 2.5 5 5 5 2.5 5 30  10/11 2.0 At goal, no change 5 2.5 5 5 5 2.5 5 30  9/12 1.8 Below goal, continue 5 2.5 5 5 5 2.5 5 30  8/9 2.2 At goal, no change 5 2.5 5 5 5 2.5 5 30  7/14 2.1 At goal, no change 5 2.5 5 5 5 2.5 5 30  6/9 3.0 At goal, no change 5 2.5 5 5 5 2.5 5 30  5/10 2.4 At goal, no change 5 2.5 5 5 5 2.5 5 30  4/13 2.4 At goal, no change 5 2.5 5 5 5 2.5 5 30    Patient History:   Recent hospitalizations/HC visits 3/1 Dr. Nadir Vasquez: no med changes    Recent medication changes None reported   Medications taken regularly that may interact with warfarin or alter INR No significant drug interactions identified   Warfarin dose taken as prescribed Yes; uses pillbox   Signs/symptoms of bleeding None reported    Vitamin K intake -Normally has ~3-4 servings/week of broccoli/salads (no kale)  -Patient eats 2/3 can of spinach ~2x/wk    Recent vomiting/diarrhea/fever, changes in weight or activity level None reported   Tobacco or alcohol use Denies use of either   Upcoming surgeries or procedures None reported     Assessment/Plan:   Patient's INR was therapeutic today (2.3). Patient denies any changes today. Patient prefers not to adjust warfarin dose, and to adjust vitamin K intake instead. Patient was instructed to continue stable warfarin regimen of 2.5 mg on Mon+Fri and 5 mg all other days. Repeat INR in 5 weeks per pt request to coordinate with other [facility]. visit on 7/18. Patient was reminded to maintain consistent vitamin K intake and call with any bleeding, medication changes, or fever/vomiting/diarrhea. Patient understands dosing directions and information discussed. Dosing schedule and follow up appointment given to patient. Progress note routed to referring physician's office. Patient acknowledges working in consult agreement with pharmacist as referred by his/her physician. Next Clinic Appointment:  7/18    Please call Bin at [phone] with any questions. Thanks!   Lito Cornelius, PharmD Candidate 2020  [facility]   [place] [address] Ph: [phone]  [facility] Ph: [phone]  6/14/2019 10:27 AM

## 2019-07-18 ENCOUNTER — ANTI-COAG VISIT (OUTPATIENT)
Dept: PHARMACY | Age: 69
End: 2019-07-18
Payer: MEDICARE

## 2019-07-18 ENCOUNTER — OFFICE VISIT (OUTPATIENT)
Dept: CARDIOLOGY CLINIC | Age: 69
End: 2019-07-18
Payer: MEDICARE

## 2019-07-18 VITALS
DIASTOLIC BLOOD PRESSURE: 84 MMHG | WEIGHT: 213.4 LBS | SYSTOLIC BLOOD PRESSURE: 138 MMHG | HEART RATE: 64 BPM | BODY MASS INDEX: 27.4 KG/M2

## 2019-07-18 DIAGNOSIS — I48.0 PAROXYSMAL ATRIAL FIBRILLATION (HCC): Primary | ICD-10-CM

## 2019-07-18 DIAGNOSIS — I34.0 NON-RHEUMATIC MITRAL REGURGITATION: ICD-10-CM

## 2019-07-18 DIAGNOSIS — R00.2 PALPITATIONS: ICD-10-CM

## 2019-07-18 DIAGNOSIS — Z79.01 LONG TERM CURRENT USE OF ANTICOAGULANT THERAPY: ICD-10-CM

## 2019-07-18 LAB — INTERNATIONAL NORMALIZATION RATIO, POC: 2.3

## 2019-07-18 PROCEDURE — 1036F TOBACCO NON-USER: CPT | Performed by: INTERNAL MEDICINE

## 2019-07-18 PROCEDURE — 1123F ACP DISCUSS/DSCN MKR DOCD: CPT | Performed by: INTERNAL MEDICINE

## 2019-07-18 PROCEDURE — 99214 OFFICE O/P EST MOD 30 MIN: CPT | Performed by: INTERNAL MEDICINE

## 2019-07-18 PROCEDURE — 4040F PNEUMOC VAC/ADMIN/RCVD: CPT | Performed by: INTERNAL MEDICINE

## 2019-07-18 PROCEDURE — G8417 CALC BMI ABV UP PARAM F/U: HCPCS | Performed by: INTERNAL MEDICINE

## 2019-07-18 PROCEDURE — 99211 OFF/OP EST MAY X REQ PHY/QHP: CPT

## 2019-07-18 PROCEDURE — G8427 DOCREV CUR MEDS BY ELIG CLIN: HCPCS | Performed by: INTERNAL MEDICINE

## 2019-07-18 PROCEDURE — 3017F COLORECTAL CA SCREEN DOC REV: CPT | Performed by: INTERNAL MEDICINE

## 2019-07-18 PROCEDURE — 85610 PROTHROMBIN TIME: CPT

## 2019-07-18 NOTE — PROGRESS NOTES
Relationship status: Not on file    Intimate partner violence:     Fear of current or ex partner: Not on file     Emotionally abused: Not on file     Physically abused: Not on file     Forced sexual activity: Not on file   Other Topics Concern    Not on file   Social History Narrative    Not on file        Patient has a family history includes Heart Disease in his mother. Patient  has a past medical history of AF (atrial fibrillation) ([facility] Aortic valve disease, and Lymphoma ([facility] [facility] Current Outpatient Medications   Medication Sig Dispense Refill    warfarin (COUMADIN) 5 MG tablet TAKE ONE-HALF TABLET BY MOUTH ON MONDAY AND FRIDAY, TAKE ONE TABLET ON ALL OTHER DAYS 90 tablet 3    sotalol (BETAPACE) 80 MG tablet TAKE ONE TABLET BY MOUTH TWICE A  tablet 5    ranitidine (ZANTAC) 150 MG tablet TAKE ONE TABLET BY MOUTH TWICE A  tablet 0    mometasone (ELOCON) 0.1 % cream Apply topically 2 times daily as needed. 30 g 1    amoxicillin (AMOXIL) 500 MG capsule Take 4 tablets by oral route 1 hour prior to the dental procedure. 4 capsule 1    clotrimazole-betamethasone (LOTRISONE) cream Apply topically 2 times daily. 15 g 1     No current facility-administered medications for this visit. Vitals:    07/18/19 1004   BP: 138/84   Pulse: 64       Weight: 213 lb 6.4 oz (96.8 kg)      Review of Systems   Constitutional: Negative for activity change and fatigue. Respiratory: Negative for apnea, cough, choking, chest tightness and shortness of breath. Cardiovascular: Negative for chest pain, palpitations and leg swelling. No PND or orthopnea. No tachycardia. Gastrointestinal: Negative for abdominal distention. Musculoskeletal: Negative for myalgias. Neurological: Negative for dizziness, syncope and light-headedness. Psychiatric/Behavioral: Negative for behavioral problems, confusion and agitation. All other systems reviewed negative as done.     Objective:   Physical Exam Constitutional: He is oriented to person, place, and time. He appears well-developed and well-nourished. No distress. HENT:   Head: Normocephalic and atraumatic. Eyes: Conjunctivae and EOM are normal. Right eye exhibits no discharge. Left eye exhibits no discharge. Neck: Normal range of motion. Neck supple. No JVD present. Cardiovascular:  Reg Rate rhythm, S1 normal, S2 normal and normal heart sounds. Exam reveals no gallop. Pulses:       Radial pulses are 2+ on the right side, and 2+ on the left side. Pulmonary/Chest: Effort normal and breath sounds normal. No respiratory distress. He has no wheezes. He has no rales. Abdominal: Soft. Bowel sounds are normal. No tenderness. Musculoskeletal: Normal range of motion. He exhibits no edema. Tr R  Neurological: He is alert and oriented to person, place, and time. Skin: Skin is warm and dry. Psychiatric: He has a normal mood and affect. His behavior is normal. Thought content normal.       Assessment:       Diagnosis Orders   1. Paroxysmal atrial fibrillation (HCC)     2. Palpitations     3. Non-rheumatic mitral regurgitation             Plan:      CV stable. Just getting beyond shingles. Active. Rhythm stable. HR stable. Continues exercising. On AC. No bleeding issues. Remains compensated. No changes. Reviewed previous records and testing including echo 3/15. Continue to monitor. Follow up 4 months.

## 2019-08-22 ENCOUNTER — ANTI-COAG VISIT (OUTPATIENT)
Dept: PHARMACY | Age: 69
End: 2019-08-22
Payer: MEDICARE

## 2019-08-22 DIAGNOSIS — I48.91 ATRIAL FIBRILLATION, UNSPECIFIED TYPE (HCC): ICD-10-CM

## 2019-08-22 DIAGNOSIS — Z79.01 LONG TERM CURRENT USE OF ANTICOAGULANT THERAPY: ICD-10-CM

## 2019-08-22 LAB — INTERNATIONAL NORMALIZATION RATIO, POC: 2.4

## 2019-08-22 PROCEDURE — 99211 OFF/OP EST MAY X REQ PHY/QHP: CPT

## 2019-08-22 PROCEDURE — 85610 PROTHROMBIN TIME: CPT

## 2019-08-22 NOTE — PROGRESS NOTES
continue 5 2.5 5 5 5 2.5 5 30  8/9 2.2 At goal, no change 5 2.5 5 5 5 2.5 5 30  7/14 2.1 At goal, no change 5 2.5 5 5 5 2.5 5 30  6/9 3.0 At goal, no change 5 2.5 5 5 5 2.5 5 30  5/10 2.4 At goal, no change 5 2.5 5 5 5 2.5 5 30  4/13 2.4 At goal, no change 5 2.5 5 5 5 2.5 5 30    Patient History:   Recent hospitalizations/HC visits 7/18 Dr. Ibarra Reasons: no med changes    Recent medication changes None reported   Medications taken regularly that may interact with warfarin or alter INR No significant drug interactions identified   Warfarin dose taken as prescribed Yes; uses pillbox   Signs/symptoms of bleeding None reported    Vitamin K intake -Normally has ~3-4 servings/week of broccoli/salads (no kale)  -Patient eats 2/3 can of spinach ~2x/wk    Recent vomiting/diarrhea/fever, changes in weight or activity level None reported   Tobacco or alcohol use Denies use of either   Upcoming surgeries or procedures None reported     Assessment/Plan:   Patient's INR was therapeutic today (2.4). Patient denies any changes today. Patient prefers not to adjust warfarin dose, and to adjust vitamin K intake instead. Patient was instructed to continue stable warfarin regimen of 2.5 mg on Mon+Fri and 5 mg all other days. Repeat INR in 6 weeks. Patient was reminded to maintain consistent vitamin K intake and call with any bleeding, medication changes, or fever/vomiting/diarrhea. Patient understands dosing directions and information discussed. Dosing schedule and follow up appointment given to patient. Progress note routed to referring physician's office. Patient acknowledges working in consult agreement with pharmacist as referred by his/her physician. Next Clinic Appointment:  10/3    Please call Bin at [phone] with any questions. Thanks! Dwight Vergara.  Roshni Love, PharmD, [facility]  [facility]  Ph: [phone]  8/22/2019 10:09 AM

## 2019-10-03 ENCOUNTER — ANTI-COAG VISIT (OUTPATIENT)
Dept: PHARMACY | Age: 69
End: 2019-10-03
Payer: MEDICARE

## 2019-10-03 DIAGNOSIS — I48.91 ATRIAL FIBRILLATION, UNSPECIFIED TYPE (HCC): ICD-10-CM

## 2019-10-03 DIAGNOSIS — Z79.01 LONG TERM CURRENT USE OF ANTICOAGULANT THERAPY: ICD-10-CM

## 2019-10-03 LAB — INTERNATIONAL NORMALIZATION RATIO, POC: 2.2

## 2019-10-03 PROCEDURE — 99211 OFF/OP EST MAY X REQ PHY/QHP: CPT

## 2019-10-03 PROCEDURE — 85610 PROTHROMBIN TIME: CPT

## 2019-11-07 ENCOUNTER — ANTI-COAG VISIT (OUTPATIENT)
Dept: PHARMACY | Age: 69
End: 2019-11-07
Payer: MEDICARE

## 2019-11-07 ENCOUNTER — OFFICE VISIT (OUTPATIENT)
Dept: CARDIOLOGY CLINIC | Age: 69
End: 2019-11-07
Payer: MEDICARE

## 2019-11-07 VITALS
HEART RATE: 70 BPM | BODY MASS INDEX: 27.35 KG/M2 | DIASTOLIC BLOOD PRESSURE: 70 MMHG | WEIGHT: 213 LBS | SYSTOLIC BLOOD PRESSURE: 118 MMHG

## 2019-11-07 DIAGNOSIS — R00.2 PALPITATIONS: ICD-10-CM

## 2019-11-07 DIAGNOSIS — I48.0 PAROXYSMAL ATRIAL FIBRILLATION (HCC): Primary | ICD-10-CM

## 2019-11-07 DIAGNOSIS — I34.0 NONRHEUMATIC MITRAL VALVE REGURGITATION: ICD-10-CM

## 2019-11-07 DIAGNOSIS — Z79.01 LONG TERM CURRENT USE OF ANTICOAGULANT THERAPY: ICD-10-CM

## 2019-11-07 DIAGNOSIS — I48.91 ATRIAL FIBRILLATION, UNSPECIFIED TYPE (HCC): ICD-10-CM

## 2019-11-07 LAB — INTERNATIONAL NORMALIZATION RATIO, POC: 2.2

## 2019-11-07 PROCEDURE — G8484 FLU IMMUNIZE NO ADMIN: HCPCS | Performed by: INTERNAL MEDICINE

## 2019-11-07 PROCEDURE — G8417 CALC BMI ABV UP PARAM F/U: HCPCS | Performed by: INTERNAL MEDICINE

## 2019-11-07 PROCEDURE — 1036F TOBACCO NON-USER: CPT | Performed by: INTERNAL MEDICINE

## 2019-11-07 PROCEDURE — 99214 OFFICE O/P EST MOD 30 MIN: CPT | Performed by: INTERNAL MEDICINE

## 2019-11-07 PROCEDURE — G8427 DOCREV CUR MEDS BY ELIG CLIN: HCPCS | Performed by: INTERNAL MEDICINE

## 2019-11-07 PROCEDURE — 3017F COLORECTAL CA SCREEN DOC REV: CPT | Performed by: INTERNAL MEDICINE

## 2019-11-07 PROCEDURE — 85610 PROTHROMBIN TIME: CPT

## 2019-11-07 PROCEDURE — 99211 OFF/OP EST MAY X REQ PHY/QHP: CPT

## 2019-11-07 PROCEDURE — 4040F PNEUMOC VAC/ADMIN/RCVD: CPT | Performed by: INTERNAL MEDICINE

## 2019-11-07 PROCEDURE — 1123F ACP DISCUSS/DSCN MKR DOCD: CPT | Performed by: INTERNAL MEDICINE

## 2019-12-05 ENCOUNTER — ANTI-COAG VISIT (OUTPATIENT)
Dept: PHARMACY | Age: 69
End: 2019-12-05
Payer: MEDICARE

## 2019-12-05 DIAGNOSIS — I48.0 PAROXYSMAL ATRIAL FIBRILLATION (HCC): ICD-10-CM

## 2019-12-05 DIAGNOSIS — Z79.01 LONG TERM CURRENT USE OF ANTICOAGULANT THERAPY: ICD-10-CM

## 2019-12-05 LAB — INTERNATIONAL NORMALIZATION RATIO, POC: 2.5

## 2019-12-05 PROCEDURE — 99211 OFF/OP EST MAY X REQ PHY/QHP: CPT

## 2019-12-05 PROCEDURE — 85610 PROTHROMBIN TIME: CPT

## 2019-12-18 ENCOUNTER — OFFICE VISIT (OUTPATIENT)
Dept: PULMONOLOGY | Age: 69
End: 2019-12-18
Payer: MEDICARE

## 2019-12-18 VITALS
SYSTOLIC BLOOD PRESSURE: 112 MMHG | DIASTOLIC BLOOD PRESSURE: 70 MMHG | BODY MASS INDEX: 28.11 KG/M2 | OXYGEN SATURATION: 98 % | HEIGHT: 74 IN | HEART RATE: 68 BPM | WEIGHT: 219 LBS

## 2019-12-18 DIAGNOSIS — G47.33 OBSTRUCTIVE SLEEP APNEA: Primary | ICD-10-CM

## 2019-12-18 DIAGNOSIS — K21.9 GASTROESOPHAGEAL REFLUX DISEASE, ESOPHAGITIS PRESENCE NOT SPECIFIED: ICD-10-CM

## 2019-12-18 DIAGNOSIS — I48.0 PAROXYSMAL ATRIAL FIBRILLATION (HCC): Chronic | ICD-10-CM

## 2019-12-18 PROCEDURE — 3017F COLORECTAL CA SCREEN DOC REV: CPT | Performed by: NURSE PRACTITIONER

## 2019-12-18 PROCEDURE — G8417 CALC BMI ABV UP PARAM F/U: HCPCS | Performed by: NURSE PRACTITIONER

## 2019-12-18 PROCEDURE — 1123F ACP DISCUSS/DSCN MKR DOCD: CPT | Performed by: NURSE PRACTITIONER

## 2019-12-18 PROCEDURE — G8484 FLU IMMUNIZE NO ADMIN: HCPCS | Performed by: NURSE PRACTITIONER

## 2019-12-18 PROCEDURE — 4040F PNEUMOC VAC/ADMIN/RCVD: CPT | Performed by: NURSE PRACTITIONER

## 2019-12-18 PROCEDURE — 1036F TOBACCO NON-USER: CPT | Performed by: NURSE PRACTITIONER

## 2019-12-18 PROCEDURE — 99213 OFFICE O/P EST LOW 20 MIN: CPT | Performed by: NURSE PRACTITIONER

## 2019-12-18 PROCEDURE — G8427 DOCREV CUR MEDS BY ELIG CLIN: HCPCS | Performed by: NURSE PRACTITIONER

## 2019-12-18 ASSESSMENT — ENCOUNTER SYMPTOMS
EYE PAIN: 0
APNEA: 0
ABDOMINAL PAIN: 0
SINUS PRESSURE: 0
SHORTNESS OF BREATH: 0
RHINORRHEA: 0
COUGH: 0
EYE REDNESS: 0
ABDOMINAL DISTENTION: 0

## 2019-12-18 ASSESSMENT — SLEEP AND FATIGUE QUESTIONNAIRES
HOW LIKELY ARE YOU TO NOD OFF OR FALL ASLEEP WHILE SITTING AND READING: 1
HOW LIKELY ARE YOU TO NOD OFF OR FALL ASLEEP WHEN YOU ARE A PASSENGER IN A CAR FOR AN HOUR WITHOUT A BREAK: 0
HOW LIKELY ARE YOU TO NOD OFF OR FALL ASLEEP WHILE SITTING QUIETLY AFTER LUNCH WITHOUT ALCOHOL: 0
HOW LIKELY ARE YOU TO NOD OFF OR FALL ASLEEP WHILE SITTING AND TALKING TO SOMEONE: 0
ESS TOTAL SCORE: 3
HOW LIKELY ARE YOU TO NOD OFF OR FALL ASLEEP IN A CAR, WHILE STOPPED FOR A FEW MINUTES IN TRAFFIC: 0
HOW LIKELY ARE YOU TO NOD OFF OR FALL ASLEEP WHILE WATCHING TV: 1
HOW LIKELY ARE YOU TO NOD OFF OR FALL ASLEEP WHILE SITTING INACTIVE IN A PUBLIC PLACE: 0
HOW LIKELY ARE YOU TO NOD OFF OR FALL ASLEEP WHILE LYING DOWN TO REST IN THE AFTERNOON WHEN CIRCUMSTANCES PERMIT: 1

## 2019-12-20 RX ORDER — WARFARIN SODIUM 5 MG/1
TABLET ORAL
Qty: 90 TABLET | Refills: 3 | Status: SHIPPED | OUTPATIENT
Start: 2019-12-20 | End: 2021-03-08

## 2019-12-20 RX ORDER — SOTALOL HYDROCHLORIDE 80 MG/1
TABLET ORAL
Qty: 180 TABLET | Refills: 3 | Status: SHIPPED | OUTPATIENT
Start: 2019-12-20 | End: 2020-07-10 | Stop reason: SDUPTHER

## 2020-01-22 ENCOUNTER — ANTI-COAG VISIT (OUTPATIENT)
Dept: PHARMACY | Age: 70
End: 2020-01-22
Payer: MEDICARE

## 2020-01-22 LAB — INTERNATIONAL NORMALIZATION RATIO, POC: 1.8

## 2020-01-22 PROCEDURE — 85610 PROTHROMBIN TIME: CPT

## 2020-01-22 PROCEDURE — 99211 OFF/OP EST MAY X REQ PHY/QHP: CPT

## 2020-01-22 NOTE — PROGRESS NOTES
ANTICOAGULATION SERVICE    Carrie Titus is a 78 y.o. male with PMHx significant for A-fib, internal hemorrhoids, DVT, lymphoma, subdural hematoma who presents to clinic 1/22/2020 for anticoagulation monitoring and adjustment.     Anticoagulation Indication(s):  Afib + h/o DVT  Referring Physician:  Dr. Hannah Lind  Goal INR Range:  2-3  Duration of Anticoagulation Therapy:  Indefinite  Time of day dose taken:  PM  Product patient has at home:  warfarin 5 mg    Lab Results   Component Value Date    RBC 5.60 06/09/2017    HGB 18.2 (H) 06/09/2017    HCT 55.7 (H) 06/09/2017    MCV 99.5 (H) 06/09/2017    MCH 32.5 (H) 06/09/2017    MPV 8.2 05/03/2011    RDW 13.4 06/09/2017     06/09/2017     INR Summary                           Warfarin regimen (mg)  Date INR   A/P   Sun Mon Tue Wed Thu Fri Sat Mg/wk  1/22 1.8 Below goal, continue 5 2.5 5 5 5 2.5 5 30  12/5 2.5 At goal, no change 5 2.5 5 5 5 2.5 5 30  11/7 2.2 At goal, no change 5 2.5 5 5 5 2.5 5 30  10/3 2.2 At goal, no change 5 2.5 5 5 5 2.5 5 30  8/22 2.4 At goal, no change 5 2.5 5 5 5 2.5 5 30  7/18 2.3  At goal, no change 5 2.5 5 5 5 2.5 5 30  6/14 2.3 At goal, no change 5 2.5 5 5 5 2.5 5 30  5/7 3.1 Above goal, continue 5 2.5 5 5 5 2.5 5 30   4/1 1.9 Below goal, continue 5 2.5 5 5 5 2.5 5 30   3/1 2.7 At goal, no change 5 2.5 5 5 5 2.5 5 30  2/4 2.2 At goal, no change 5 2.5 5 5 5 2.5 5 30  12/17 2.2 At goal, no change 5 2.5 5 5 5 2.5 5 30  10/31 2.1 At goal, no change 5 2.5 5 5 5 2.5 5 30  9/27 2.5 At goal, no change 5 2.5 5 5 5 2.5 5 30  8/30 2.0 At goal, no change 5 2.5 5 5 5 2.5 5 30  8/1 1.8 Below goal, continue 5 2.5 5 5 5 2.5 5 30  6/27 2.3 At goal, no change 5 2.5 5 5 5 2.5 5 30  6/1 2.5 At goal, no change 5 2.5 5 5 5 2.5 5 30  5/2 3.1 Above goal, continue 5 2.5 5 5 5 2.5 5 30  4/4 2.2 At goal, no change 5 2.5 5 5 5 2.5 5 30  3/14 1.9 Below goal, continue 5 2.5 5 5 5 2.5 5 30  2/9 2.2 At goal, no change 5 2.5 5 5 5 2.5 5 30  1/11 2.0 At goal, no questions. Thanks!   uJstyn PearsonD  PGY1 Pharmacy Resident   [employer]: [phone]  1/22/2020 10:45 AM

## 2020-02-20 ENCOUNTER — ANTI-COAG VISIT (OUTPATIENT)
Dept: PHARMACY | Age: 70
End: 2020-02-20
Payer: MEDICARE

## 2020-02-20 LAB — INTERNATIONAL NORMALIZATION RATIO, POC: 2.9

## 2020-02-20 PROCEDURE — 99211 OFF/OP EST MAY X REQ PHY/QHP: CPT

## 2020-02-20 PROCEDURE — 85610 PROTHROMBIN TIME: CPT

## 2020-02-20 NOTE — PROGRESS NOTES
ANTICOAGULATION SERVICE    Carrie Titus is a 78 y.o. male with PMHx significant for A-fib, internal hemorrhoids, DVT, lymphoma, subdural hematoma who presents to clinic 2/20/2020 for anticoagulation monitoring and adjustment.     Anticoagulation Indication(s):  Afib + h/o DVT  Referring Physician:  Dr. Hannah Lind  Goal INR Range:  2-3  Duration of Anticoagulation Therapy:  Indefinite  Time of day dose taken:  PM  Product patient has at home:  warfarin 5 mg    INR Summary                           Warfarin regimen (mg)  Date INR   A/P   Sun Mon Tue Wed Thu Fri Sat Mg/wk  2/20 2.9 At goal, no change 5 2.5 5 5 5 2.5 5 30  1/22 1.8 Below goal, continue 5 2.5 5 5 5 2.5 5 30  12/5 2.5 At goal, no change 5 2.5 5 5 5 2.5 5 30  11/7 2.2 At goal, no change 5 2.5 5 5 5 2.5 5 30  10/3 2.2 At goal, no change 5 2.5 5 5 5 2.5 5 30  8/22 2.4 At goal, no change 5 2.5 5 5 5 2.5 5 30  7/18 2.3  At goal, no change 5 2.5 5 5 5 2.5 5 30  6/14 2.3 At goal, no change 5 2.5 5 5 5 2.5 5 30  5/7 3.1 Above goal, continue 5 2.5 5 5 5 2.5 5 30   4/1 1.9 Below goal, continue 5 2.5 5 5 5 2.5 5 30   3/1 2.7 At goal, no change 5 2.5 5 5 5 2.5 5 30  2/4 2.2 At goal, no change 5 2.5 5 5 5 2.5 5 30  12/17 2.2 At goal, no change 5 2.5 5 5 5 2.5 5 30  10/31 2.1 At goal, no change 5 2.5 5 5 5 2.5 5 30  9/27 2.5 At goal, no change 5 2.5 5 5 5 2.5 5 30  8/30 2.0 At goal, no change 5 2.5 5 5 5 2.5 5 30  8/1 1.8 Below goal, continue 5 2.5 5 5 5 2.5 5 30  6/27 2.3 At goal, no change 5 2.5 5 5 5 2.5 5 30  6/1 2.5 At goal, no change 5 2.5 5 5 5 2.5 5 30  5/2 3.1 Above goal, continue 5 2.5 5 5 5 2.5 5 30  4/4 2.2 At goal, no change 5 2.5 5 5 5 2.5 5 30  3/14 1.9 Below goal, continue 5 2.5 5 5 5 2.5 5 30  2/9 2.2 At goal, no change 5 2.5 5 5 5 2.5 5 30  1/11 2.0 At goal, no change 5 2.5 5 5 5 2.5 5 30  12/13 2.1 At goal, no change 5 2.5 5 5 5 2.5 5 30    Patient History:   Recent hospitalizations/HC visits 12/18 Dr. Samy Pascual (pulm): no med changes   11/7 Dr. Hannah Lind: no

## 2020-03-12 ENCOUNTER — OFFICE VISIT (OUTPATIENT)
Dept: CARDIOLOGY CLINIC | Age: 70
End: 2020-03-12
Payer: MEDICARE

## 2020-03-12 VITALS
WEIGHT: 217 LBS | HEART RATE: 60 BPM | BODY MASS INDEX: 27.86 KG/M2 | SYSTOLIC BLOOD PRESSURE: 138 MMHG | DIASTOLIC BLOOD PRESSURE: 90 MMHG

## 2020-03-12 PROCEDURE — 3017F COLORECTAL CA SCREEN DOC REV: CPT | Performed by: INTERNAL MEDICINE

## 2020-03-12 PROCEDURE — 99214 OFFICE O/P EST MOD 30 MIN: CPT | Performed by: INTERNAL MEDICINE

## 2020-03-12 PROCEDURE — G8427 DOCREV CUR MEDS BY ELIG CLIN: HCPCS | Performed by: INTERNAL MEDICINE

## 2020-03-12 PROCEDURE — G8484 FLU IMMUNIZE NO ADMIN: HCPCS | Performed by: INTERNAL MEDICINE

## 2020-03-12 PROCEDURE — G8417 CALC BMI ABV UP PARAM F/U: HCPCS | Performed by: INTERNAL MEDICINE

## 2020-03-12 PROCEDURE — 4040F PNEUMOC VAC/ADMIN/RCVD: CPT | Performed by: INTERNAL MEDICINE

## 2020-03-12 PROCEDURE — 1123F ACP DISCUSS/DSCN MKR DOCD: CPT | Performed by: INTERNAL MEDICINE

## 2020-03-12 PROCEDURE — 1036F TOBACCO NON-USER: CPT | Performed by: INTERNAL MEDICINE

## 2020-03-12 NOTE — PROGRESS NOTES
Subjective:      Patient ID: Krystin Szymanski is a 78 y.o. male. SÁNCHEZ Barone is here today for a 4 month follow up afib/palp/mr. No complaints. Continues exercising. Rhythm good. No sob. No edema. No pnd or orthopnea. No syncope. No syncope. Wt down.          Past Medical History:   Diagnosis Date    AF (atrial fibrillation) (HCC)     Aortic valve disease     leaking no problems    Lymphoma [facility])      Past Surgical History:   Procedure Laterality Date    APPENDECTOMY      COLONOSCOPY  8/18/2006     Dr. Wilian Ortega - internal hemorrhoids , hyperplastic polyp     CYSTOSCOPY  1-11-10     Dr. Margaret Mcdaniel ureteral stent     LYMPH NODE BIOPSY  5/11/11    RIGHT CERVICAL    LYMPH NODE DISSECTION N/A 2011    TONSILLECTOMY           Allergies   Allergen Reactions    Gentamicin Other (See Comments)     Dad had reaction        Social History     Socioeconomic History    Marital status:      Spouse name: Not on file    Number of children: 0    Years of education: Not on file    Highest education level: Not on file   Occupational History    Occupation: retired   Social Needs    Financial resource strain: Not on file    Food insecurity     Worry: Not on file     Inability: Not on file   [employer] needs     Medical: Not on file     Non-medical: Not on file   Tobacco Use    Smoking status: Never Smoker    Smokeless tobacco: Never Used   Substance and Sexual Activity    Alcohol use: No     Alcohol/week: 0.0 standard drinks    Drug use: No    Sexual activity: Yes     Partners: Female   Lifestyle    Physical activity     Days per week: Not on file     Minutes per session: Not on file    Stress: Not on file   Relationships    Social connections     Talks on phone: Not on file     Gets together: Not on file     Attends [employer] service: Not on file     Active member of club or organization: Not on file     Attends meetings of clubs or organizations: Not on file     Relationship status: Not on file    Intimate partner violence     Fear of current or ex partner: Not on file     Emotionally abused: Not on file     Physically abused: Not on file     Forced sexual activity: Not on file   Other Topics Concern    Not on file   Social History Narrative    Not on file        Patient has a family history includes Heart Disease in his mother. Patient  has a past medical history of AF (atrial fibrillation) ([facility] [address] Aortic valve disease, and Lymphoma ([facility] [address] Current Outpatient Medications   Medication Sig Dispense Refill    warfarin (COUMADIN) 5 MG tablet TAKE ONE-HALF TABLET BY MOUTH DAILY ON MONDAY AND FRIDAY, TAKE ONE TABLET BY MOUTH DAILY ON ALL OTHER DAYS 90 tablet 3    sotalol (BETAPACE) 80 MG tablet TAKE ONE TABLET BY MOUTH TWICE A  tablet 3    ranitidine (ZANTAC) 150 MG tablet TAKE ONE TABLET BY MOUTH TWICE A  tablet 0    mometasone (ELOCON) 0.1 % cream Apply topically 2 times daily as needed. 30 g 1    amoxicillin (AMOXIL) 500 MG capsule Take 4 tablets by oral route 1 hour prior to the dental procedure. 4 capsule 1    clotrimazole-betamethasone (LOTRISONE) cream Apply topically 2 times daily. 15 g 1     No current facility-administered medications for this visit. Vitals:    03/12/20 0943   BP: (!) 138/90   Pulse: 60       Weight: 217 lb (98.4 kg)      Review of Systems   Constitutional: Negative for activity change and fatigue. Respiratory: Negative for apnea, cough, choking, chest tightness and shortness of breath. Cardiovascular: Negative for chest pain, palpitations and leg swelling. No PND or orthopnea. No tachycardia. Gastrointestinal: Negative for abdominal distention. Musculoskeletal: Negative for myalgias. Neurological: Negative for dizziness, syncope and light-headedness. Psychiatric/Behavioral: Negative for behavioral problems, confusion and agitation. All other systems reviewed negative as done.     Objective:   Physical Exam

## 2020-04-15 NOTE — PROGRESS NOTES
Received call back from James at [facility]. Patient sees Dr. Tammie Rao, who is not in the office today. Their office is able to do POC INR testing, but they need to check with physician before approving. [facility] will call back tomorrow after discussing with Dr. Tammie Rao. In[facility] [id number] [facility] provide warfarin dosing instructions to patient. Patria Torres.  Dede Malone, PharmD, [facility]  [facility]  Ph: [phone]  4/15/2020 1:02 PM

## 2020-04-23 ENCOUNTER — ANTI-COAG VISIT (OUTPATIENT)
Dept: PHARMACY | Age: 70
End: 2020-04-23
Payer: MEDICARE

## 2020-04-23 LAB — INR BLD: 2.7

## 2020-04-23 PROCEDURE — 99211 OFF/OP EST MAY X REQ PHY/QHP: CPT

## 2020-04-23 NOTE — PROGRESS NOTES
ANTICOAGULATION SERVICE    Matilda Spears is a 78 y.o. male with PMHx significant for A-fib, internal hemorrhoids, DVT, lymphoma, subdural hematoma who presents to clinic 4/23/2020 for anticoagulation monitoring and adjustment.     Anticoagulation Indication(s):  Afib + h/o DVT  Referring Physician:  Dr. Joy Kennedy  Goal INR Range:  2-3  Duration of Anticoagulation Therapy:  Indefinite  Time of day dose taken:  PM  Product patient has at home:  warfarin 5 mg    INR Summary                           Warfarin regimen (mg)  Date INR   A/P   Sun Mon Tue Wed Thu Fri Sat Mg/wk  4/23 2.7 At goal, no change 5 2.5 5 5 5 2.5 5 30  2/20 2.9 At goal, no change 5 2.5 5 5 5 2.5 5 30  1/22 1.8 Below goal, continue 5 2.5 5 5 5 2.5 5 30  12/5 2.5 At goal, no change 5 2.5 5 5 5 2.5 5 30  11/7 2.2 At goal, no change 5 2.5 5 5 5 2.5 5 30  10/3 2.2 At goal, no change 5 2.5 5 5 5 2.5 5 30  8/22 2.4 At goal, no change 5 2.5 5 5 5 2.5 5 30  7/18 2.3  At goal, no change 5 2.5 5 5 5 2.5 5 30  6/14 2.3 At goal, no change 5 2.5 5 5 5 2.5 5 30  5/7 3.1 Above goal, continue 5 2.5 5 5 5 2.5 5 30   4/1 1.9 Below goal, continue 5 2.5 5 5 5 2.5 5 30   3/1 2.7 At goal, no change 5 2.5 5 5 5 2.5 5 30  2/4 2.2 At goal, no change 5 2.5 5 5 5 2.5 5 30  12/17 2.2 At goal, no change 5 2.5 5 5 5 2.5 5 30  10/31 2.1 At goal, no change 5 2.5 5 5 5 2.5 5 30  9/27 2.5 At goal, no change 5 2.5 5 5 5 2.5 5 30  8/30 2.0 At goal, no change 5 2.5 5 5 5 2.5 5 30  8/1 1.8 Below goal, continue 5 2.5 5 5 5 2.5 5 30  6/27 2.3 At goal, no change 5 2.5 5 5 5 2.5 5 30  6/1 2.5 At goal, no change 5 2.5 5 5 5 2.5 5 30  5/2 3.1 Above goal, continue 5 2.5 5 5 5 2.5 5 30  4/4 2.2 At goal, no change 5 2.5 5 5 5 2.5 5 30  3/14 1.9 Below goal, continue 5 2.5 5 5 5 2.5 5 30  2/9 2.2 At goal, no change 5 2.5 5 5 5 2.5 5 30  1/11 2.0 At goal, no change 5 2.5 5 5 5 2.5 5 30  12/13 2.1 At goal, no change 5 2.5 5 5 5 2.5 5 30    Patient History:   Recent hospitalizations/HC visits 12/18

## 2020-06-01 ENCOUNTER — TELEPHONE (OUTPATIENT)
Dept: PHARMACY | Age: 70
End: 2020-06-01

## 2020-06-04 ENCOUNTER — ANTI-COAG VISIT (OUTPATIENT)
Dept: PHARMACY | Age: 70
End: 2020-06-04
Payer: MEDICARE

## 2020-06-04 LAB — INTERNATIONAL NORMALIZATION RATIO, POC: 2.4

## 2020-06-04 PROCEDURE — 85610 PROTHROMBIN TIME: CPT

## 2020-06-04 PROCEDURE — 99211 OFF/OP EST MAY X REQ PHY/QHP: CPT

## 2020-06-04 NOTE — PROGRESS NOTES
ANTICOAGULATION SERVICE    Tasha Hunt is a 78 y.o. male with PMHx significant for A-fib, internal hemorrhoids, DVT, lymphoma, subdural hematoma who presents to drive thru clinic 6/4/0161 for anticoagulation monitoring and adjustment.     Anticoagulation Indication(s):  Afib + h/o DVT  Referring Physician:  Dr. Lou Harris  Goal INR Range:  2-3  Duration of Anticoagulation Therapy:  Indefinite  Time of day dose taken:  PM  Product patient has at home:  warfarin 5 mg    INR Summary                           Warfarin regimen (mg)  Date INR   A/P   Sun Mon Tue Wed Thu Fri Sat Mg/wk  6/4 2.4 At goal, no change 5 2.5 5 5 5 2.5 5 30  4/23 2.7 At goal, no change 5 2.5 5 5 5 2.5 5 30  2/20 2.9 At goal, no change 5 2.5 5 5 5 2.5 5 30  1/22 1.8 Below goal, continue 5 2.5 5 5 5 2.5 5 30  12/5 2.5 At goal, no change 5 2.5 5 5 5 2.5 5 30  11/7 2.2 At goal, no change 5 2.5 5 5 5 2.5 5 30  10/3 2.2 At goal, no change 5 2.5 5 5 5 2.5 5 30  8/22 2.4 At goal, no change 5 2.5 5 5 5 2.5 5 30  7/18 2.3  At goal, no change 5 2.5 5 5 5 2.5 5 30  6/14 2.3 At goal, no change 5 2.5 5 5 5 2.5 5 30  5/7 3.1 Above goal, continue 5 2.5 5 5 5 2.5 5 30   4/1 1.9 Below goal, continue 5 2.5 5 5 5 2.5 5 30   3/1 2.7 At goal, no change 5 2.5 5 5 5 2.5 5 30  2/4 2.2 At goal, no change 5 2.5 5 5 5 2.5 5 30  12/17 2.2 At goal, no change 5 2.5 5 5 5 2.5 5 30  10/31 2.1 At goal, no change 5 2.5 5 5 5 2.5 5 30  9/27 2.5 At goal, no change 5 2.5 5 5 5 2.5 5 30  8/30 2.0 At goal, no change 5 2.5 5 5 5 2.5 5 30  8/1 1.8 Below goal, continue 5 2.5 5 5 5 2.5 5 30  6/27 2.3 At goal, no change 5 2.5 5 5 5 2.5 5 30  6/1 2.5 At goal, no change 5 2.5 5 5 5 2.5 5 30  5/2 3.1 Above goal, continue 5 2.5 5 5 5 2.5 5 30  4/4 2.2 At goal, no change 5 2.5 5 5 5 2.5 5 30  3/14 1.9 Below goal, continue 5 2.5 5 5 5 2.5 5 30  2/9 2.2 At goal, no change 5 2.5 5 5 5 2.5 5 30  1/11 2.0 At goal, no change 5 2.5 5 5 5 2.5 5 30  12/13 2.1 At goal, no change 5 2.5 5 5 5 2.5 5 30    Patient History:   Recent hospitalizations/HC visits 12/18 Dr. Carrington Dill (pulm): no med changes   11/7 Dr. Sorin Sal: no med changes   Recent medication changes D/c ranitidine last month    Medications taken regularly that may interact with warfarin or alter INR No significant drug interactions identified   Warfarin dose taken as prescribed Yes; uses pillbox   Signs/symptoms of bleeding None reported    Vitamin K intake -Normally has ~2-3 servings/week (2/3 can of spinach)  -1/22: pt was on vacation and ate salads and spinach daily    Recent vomiting/diarrhea/fever, changes in weight or activity level None reported   Tobacco or alcohol use Denies use of either   Upcoming surgeries or procedures None reported     Assessment/Plan:   Patient's INR was therapeutic today (2.4). He denies any missed/extra warfarin doses, significant changes to diet or medications, and any unusual bleeding. Pt self d/c'd ranitidine last month, but this does not have a significant interaction with warfarin. Patient prefers not to adjust warfarin dose, and to adjust vitamin K intake instead. Patient was instructed to continue stable warfarin regimen of 2.5 mg on Mon+Fri and 5 mg all other days. Repeat INR in 6 weeks to coordinate with patient's appointment at [facility]. Patient was reminded to maintain consistent vitamin K intake and call with any bleeding, medication changes, or fever/vomiting/diarrhea. Patient understands dosing directions and information discussed. Dosing schedule and follow up appointment given to patient. Progress note routed to referring physician's office. Patient acknowledges working in consult agreement with pharmacist as referred by his/her physician. Next Clinic Appointment:  7/17    Please call Bin at [phone] with any questions. Thanks!   James Bennett, PharmD, [facility]  [facility]   [address] Ph: [phone]  [facility] Ph: [phone]  6/4/2020 9:13 AM      CLINICAL PHARMACY CONSULT: MED RECONCILIATION/REVIEW ADDENDUM    For Pharmacy Admin Tracking Only    PHSO: No  Total # of Interventions Recommended: 0  - Discontinued Medication #: 1 Discontinue Reason(s): No Longer Used  - Maintenance Safety Lab Monitoring #: 1  Total Interventions Accepted: 0  Time Spent (min): 15    Justyn MarroquinD

## 2020-07-10 ENCOUNTER — OFFICE VISIT (OUTPATIENT)
Dept: CARDIOLOGY CLINIC | Age: 70
End: 2020-07-10
Payer: MEDICARE

## 2020-07-10 VITALS
BODY MASS INDEX: 26.96 KG/M2 | TEMPERATURE: 97.5 F | SYSTOLIC BLOOD PRESSURE: 140 MMHG | DIASTOLIC BLOOD PRESSURE: 90 MMHG | OXYGEN SATURATION: 95 % | HEIGHT: 75 IN | HEART RATE: 78 BPM | WEIGHT: 216.8 LBS

## 2020-07-10 PROBLEM — I10 ESSENTIAL HYPERTENSION: Status: ACTIVE | Noted: 2020-07-10

## 2020-07-10 PROBLEM — R53.1 WEAKNESS: Status: ACTIVE | Noted: 2020-07-10

## 2020-07-10 PROCEDURE — G8427 DOCREV CUR MEDS BY ELIG CLIN: HCPCS | Performed by: NURSE PRACTITIONER

## 2020-07-10 PROCEDURE — 3017F COLORECTAL CA SCREEN DOC REV: CPT | Performed by: NURSE PRACTITIONER

## 2020-07-10 PROCEDURE — 1123F ACP DISCUSS/DSCN MKR DOCD: CPT | Performed by: NURSE PRACTITIONER

## 2020-07-10 PROCEDURE — 93000 ELECTROCARDIOGRAM COMPLETE: CPT | Performed by: NURSE PRACTITIONER

## 2020-07-10 PROCEDURE — G8417 CALC BMI ABV UP PARAM F/U: HCPCS | Performed by: NURSE PRACTITIONER

## 2020-07-10 PROCEDURE — 99214 OFFICE O/P EST MOD 30 MIN: CPT | Performed by: NURSE PRACTITIONER

## 2020-07-10 PROCEDURE — 4040F PNEUMOC VAC/ADMIN/RCVD: CPT | Performed by: NURSE PRACTITIONER

## 2020-07-10 PROCEDURE — 1036F TOBACCO NON-USER: CPT | Performed by: NURSE PRACTITIONER

## 2020-07-10 RX ORDER — SOTALOL HYDROCHLORIDE 80 MG/1
TABLET ORAL
Qty: 180 TABLET | Refills: 3 | Status: SHIPPED | OUTPATIENT
Start: 2020-07-10 | End: 2021-11-01

## 2020-07-10 ASSESSMENT — ENCOUNTER SYMPTOMS
GASTROINTESTINAL NEGATIVE: 1
RESPIRATORY NEGATIVE: 1

## 2020-07-10 NOTE — PROGRESS NOTES
[address]      Primary Care Doctor:  No primary care provider on file. Primary Cardiologist: Tiffanie Leal    Chief Complaint:  weakness    History of Present Illness:  Karine Tapia is a 69 y.o. male with PMH AF, lymphoma who presents today with c/o unsteadiness for the past week. He denies chest pain, dyspnea, palpitations, orthopnea, PND, exertional chest pressure/discomfort, fatigue, early saiety, edema, syncope. EKG today shows AFlutter    EF: 50-55%  Cardiac Imaging: Echo 2015   Summary   Normal left ventricle size and wall thickness. Normal left ventricular systolic function with an estimated ejection   fraction of 50-55%. No regional wall motion abnormalities are seen. The patient's underlying rhythm is atrial fibrillation. The left atrium appears dilated. The right atrium appears at the upper limits of normal in size. Mild mitral and pulmonic regurgitation. Trace tricuspid regurgitation. RVSP 34 mmHg. Left atrial enlargement from prior echo on 12/23/13. Device: No       Activity: at baseline  Can you walk 1-2 blocks or do a moderate amount of house/yard work? Yes        Pt Education: The patient has received education on the following topics: dietary guidelines, heart medications, the importance of physical activity, symptom management and reduction of cardiac risk factors. Past Medical History:   has a past medical history of AF (atrial fibrillation) ([address] Aortic valve disease, and Lymphoma ([facility] [address] SurgicalHistory:   has a past surgical history that includes Cystoscopy (1-11-10); Colonoscopy (8/18/2006 ); Appendectomy; lymph node biopsy (5/11/11); lymph node dissection (N/A, 2011); and Tonsillectomy. Social History:   reports that he has never smoked. He has never used smokeless tobacco. He reports that he does not drink alcohol or use drugs.    Family History:   Family History   Problem Relation Age of Onset    Heart Disease Mother        Home Medications:  Prior to Admission medications    Medication Sig Start Date End Date Taking? Authorizing Provider   warfarin (COUMADIN) 5 MG tablet TAKE ONE-HALF TABLET BY MOUTH DAILY ON MONDAY AND FRIDAY, TAKE ONE TABLET BY MOUTH DAILY ON ALL OTHER DAYS 12/20/19   Ricky Leonard MD   sotalol (BETAPACE) 80 MG tablet TAKE ONE TABLET BY MOUTH TWICE A DAY 12/20/19   Ricky Leonard MD   mometasone (ELOCON) 0.1 % cream Apply topically 2 times daily as needed. 8/22/17   Jose Mckay MD   amoxicillin (AMOXIL) 500 MG capsule Take 4 tablets by oral route 1 hour prior to the dental procedure. 12/6/16   Loretta Arevalo MD   clotrimazole-betamethasone (LOTRISONE) cream Apply topically 2 times daily. 8/25/14   Jose Mckay MD        Allergies:  Gentamicin     ROS:   Review of Systems   Constitutional: Negative. Respiratory: Negative. Cardiovascular: Negative. Gastrointestinal: Negative. Genitourinary: Negative. Musculoskeletal: Negative. Skin: Negative. Neurological: Positive for dizziness and weakness. Hematological: Negative. Psychiatric/Behavioral: Negative. Physical Examination:    Vitals:    07/10/20 1315 07/10/20 1326 07/10/20 1330   BP: (!) 142/90 (!) 148/90 (!) 140/90   Pulse: 72 90 78   Temp: 97.5 °F (36.4 °C)     SpO2: 95%     Weight: 216 lb 12.8 oz (98.3 kg)     Height: 6' 3\" (1.905 m)             Physical Exam  Constitutional:       Appearance: Normal appearance. He is normal weight. HENT:      Head: Normocephalic and atraumatic. Eyes:      Extraocular Movements: Extraocular movements intact. Pupils: Pupils are equal, round, and reactive to light. Neck:      Musculoskeletal: Normal range of motion and neck supple. Cardiovascular:      Rate and Rhythm: Normal rate. Rhythm irregular. Pulses: Normal pulses. Heart sounds: Normal heart sounds. Pulmonary:      Effort: Pulmonary effort is normal.      Breath sounds: Normal breath sounds.    Abdominal:      Palpations: Abdomen is soft.   Musculoskeletal: Normal range of motion. Skin:     General: Skin is warm and dry. Neurological:      General: No focal deficit present. Mental Status: He is alert and oriented to person, place, and time. Mental status is at baseline. Psychiatric:         Mood and Affect: Mood normal.         Behavior: Behavior normal.         Thought Content: Thought content normal.         Judgment: Judgment normal.         Lab Data:    CBC:   Lab Results   Component Value Date    WBC 6.4 06/09/2017    WBC 7.5 03/17/2017    WBC 5.6 12/09/2016    WBC 5.4 05/03/2011    RBC 5.60 06/09/2017    RBC 5.54 03/17/2017    RBC 5.14 12/09/2016    HGB 18.2 06/09/2017    HGB 18.3 03/17/2017    HGB 16.1 12/09/2016    HCT 55.7 06/09/2017    HCT 55.2 03/17/2017    HCT 51.0 12/09/2016    MCV 99.5 06/09/2017    MCV 99.6 03/17/2017    MCV 99.3 12/09/2016    RDW 13.4 06/09/2017    RDW 14.9 03/17/2017    RDW 14.0 12/09/2016     06/09/2017     03/17/2017     12/09/2016     BMP:  Lab Results   Component Value Date     10/21/2016     07/07/2015     04/07/2015    K 4.5 10/21/2016    K 4.2 07/07/2015    K 4.3 04/07/2015    CL 98 10/21/2016     07/07/2015     04/07/2015    CO2 28 10/21/2016    CO2 27 07/07/2015    CO2 28 04/07/2015    BUN 17 10/21/2016    BUN 18 07/07/2015    BUN 20 04/07/2015    CREATININE 1.25 10/21/2016    CREATININE 1.24 07/07/2015    CREATININE 1.35 04/07/2015     BNP: No results found for: PROBNP       Assessment/Plan:    1. Paroxysmal atrial fibrillation (HCC) - rate controlled, on AC   2. Dizziness - echo   3. Weakness -echo   4.  Essential hypertension - elevated today, monitor     *more than 50 percent of the face-to-face portion of the office visit was spent counseling and coordinating care including:   Diagnostic results, impressions, or recommended diagnostic studies  Prognosis  Risks and benefits of management (treatment) options  Risk factor reduction  Patient and family education. Instructions:   1. Medications: continue current medications  2. Labs: per [facility]  3. Referrals: Echo  4. Follow up: July 23 with Dr Jeremías Martinez          I appreciate the opportunity of cooperating in the care of this individual.    Karli Tinsley CNP, 7/10/2020, 9:03 AM    QUALITY MEASURES  1. Tobacco Cessation Counseling: NA  2. Retake of BP if >140/90:   Yes  3. Documentation to PCP/referring for new patient:  Sent to PCP at close of office visit  4. CAD patient on anti-platelet:NA  11. CAD patient on STATIN therapy:  NA  6. Patient with CHF and aFib on anticoagulation:  Yes   7. Patient Education:  Yes   8. BB for LVSD :NA   9. ACE/ARB for LVSD:  NA   10.  Left Ventricular Ejection Fraction (LVEF) Assessment:  Yes

## 2020-07-10 NOTE — PATIENT INSTRUCTIONS
Instructions:   1. Medications: continue current medications  [facility]. [facility] ask about kidney function test  3. Referrals: Echo  4.  Follow up: July 23 with Dr Fiordaliza Ag

## 2020-07-16 ENCOUNTER — HOSPITAL ENCOUNTER (OUTPATIENT)
Dept: NON INVASIVE DIAGNOSTICS | Age: 70
Discharge: HOME | End: 2020-07-16
Payer: MEDICARE

## 2020-07-16 LAB
LV EF: 53 %
LVEF MODALITY: NORMAL

## 2020-07-16 PROCEDURE — 93306 TTE W/DOPPLER COMPLETE: CPT

## 2020-07-23 ENCOUNTER — OFFICE VISIT (OUTPATIENT)
Dept: CARDIOLOGY CLINIC | Age: 70
End: 2020-07-23
Payer: MEDICARE

## 2020-07-23 ENCOUNTER — ANTI-COAG VISIT (OUTPATIENT)
Dept: PHARMACY | Age: 70
End: 2020-07-23
Payer: MEDICARE

## 2020-07-23 VITALS
BODY MASS INDEX: 28 KG/M2 | OXYGEN SATURATION: 99 % | WEIGHT: 218.2 LBS | DIASTOLIC BLOOD PRESSURE: 60 MMHG | HEART RATE: 66 BPM | HEIGHT: 74 IN | SYSTOLIC BLOOD PRESSURE: 110 MMHG | TEMPERATURE: 96.6 F

## 2020-07-23 LAB — INTERNATIONAL NORMALIZATION RATIO, POC: 2.9

## 2020-07-23 PROCEDURE — G8417 CALC BMI ABV UP PARAM F/U: HCPCS | Performed by: INTERNAL MEDICINE

## 2020-07-23 PROCEDURE — 1123F ACP DISCUSS/DSCN MKR DOCD: CPT | Performed by: INTERNAL MEDICINE

## 2020-07-23 PROCEDURE — 3017F COLORECTAL CA SCREEN DOC REV: CPT | Performed by: INTERNAL MEDICINE

## 2020-07-23 PROCEDURE — 4040F PNEUMOC VAC/ADMIN/RCVD: CPT | Performed by: INTERNAL MEDICINE

## 2020-07-23 PROCEDURE — G8427 DOCREV CUR MEDS BY ELIG CLIN: HCPCS | Performed by: INTERNAL MEDICINE

## 2020-07-23 PROCEDURE — 1036F TOBACCO NON-USER: CPT | Performed by: INTERNAL MEDICINE

## 2020-07-23 PROCEDURE — 99211 OFF/OP EST MAY X REQ PHY/QHP: CPT

## 2020-07-23 PROCEDURE — 85610 PROTHROMBIN TIME: CPT

## 2020-07-23 PROCEDURE — 99214 OFFICE O/P EST MOD 30 MIN: CPT | Performed by: INTERNAL MEDICINE

## 2020-07-23 NOTE — PROGRESS NOTES
Subjective:      Patient ID: Duane Rancher is a 69 y.o. male. HPI Silver Sacks is here today for a 4 month follow up afib/palp/mr. No complaints. Continues exercising. Rhythm good. No sob. No edema. No pnd or orthopnea. No syncope. No syncope. Wt down.          Past Medical History:   Diagnosis Date    AF (atrial fibrillation) (HCC)     Aortic valve disease     leaking no problems    Lymphoma [facility])      Past Surgical History:   Procedure Laterality Date    APPENDECTOMY      COLONOSCOPY  8/18/2006     Dr. Adali Choi - internal hemorrhoids , hyperplastic polyp     CYSTOSCOPY  1-11-10     Dr. Ana Harrison ureteral stent     LYMPH NODE BIOPSY  5/11/11    RIGHT CERVICAL    LYMPH NODE DISSECTION N/A 2011    TONSILLECTOMY           Allergies   Allergen Reactions    Gentamicin Other (See Comments)     Dad had reaction        Social History     Socioeconomic History    Marital status:      Spouse name: Not on file    Number of children: 0    Years of education: Not on file    Highest education level: Not on file   Occupational History    Occupation: retired   Social Needs    Financial resource strain: Not on file    Food insecurity     Worry: Not on file     Inability: Not on file   mylearnadfriend needs     Medical: Not on file     Non-medical: Not on file   Tobacco Use    Smoking status: Never Smoker    Smokeless tobacco: Never Used   Substance and Sexual Activity    Alcohol use: No     Alcohol/week: 0.0 standard drinks    Drug use: No    Sexual activity: Yes     Partners: Female   Lifestyle    Physical activity     Days per week: Not on file     Minutes per session: Not on file    Stress: Not on file   Relationships    Social connections     Talks on phone: Not on file     Gets together: Not on file     Attends Hindu service: Not on file     Active member of club or organization: Not on file     Attends meetings of clubs or organizations: Not on file     Relationship status: Not on well-developed and well-nourished. No distress. HENT:   Head: Normocephalic and atraumatic. Eyes: Conjunctivae and EOM are normal. Right eye exhibits no discharge. Left eye exhibits no discharge. Neck: Normal range of motion. Neck supple. No JVD present. Cardiovascular:  irreg Rate rhythm, S1 normal, S2 normal and normal heart sounds. Exam reveals no gallop. Pulses:       Radial pulses are 2+ on the right side, and 2+ on the left side. Pulmonary/Chest: Effort normal and breath sounds normal. No respiratory distress. He has no wheezes. He has no rales. Abdominal: Soft. Bowel sounds are normal. No tenderness. Musculoskeletal: Normal range of motion. He exhibits no edema. Tr R  Neurological: He is alert and oriented to person, place, and time. Skin: Skin is warm and dry. Psychiatric: He has a normal mood and affect. His behavior is normal. Thought content normal.       Assessment:       Diagnosis Orders   1. Paroxysmal atrial fibrillation (HCC)     2. Palpitations     3. Nonrheumatic mitral valve regurgitation             Plan:      CV stable. Active. Rhythm stable. HR somewhat slow. Will decrease sotalol to 40 mg bid. .  Continues exercising. On AC. No bleeding issues. Remains compensated. No changes. Reviewed previous records and testing including echo 7/20. Continue to monitor. Follow up 6-8 wks. Ole Valenzuela

## 2020-07-23 NOTE — PROGRESS NOTES
ANTICOAGULATION SERVICE    Almas Dimas is a 69 y.o. male with PMHx significant for A-fib, internal hemorrhoids, DVT, lymphoma, subdural hematoma who presents to clinic 7/23/2020 for anticoagulation monitoring and adjustment.     Anticoagulation Indication(s):  Afib + h/o DVT  Referring Physician:  Dr. Fiordaliza Ag  Goal INR Range:  2-3  Duration of Anticoagulation Therapy:  Indefinite  Time of day dose taken:  PM  Product patient has at home:  warfarin 5 mg    INR Summary                           Warfarin regimen (mg)  Date INR   A/P   Sun Mon Tue Wed Thu Fri Sat Mg/wk  7/23 2.9 At goal, no change 5 2.5 5 5 5 2.5 5 30  6/4 2.4 At goal, no change 5 2.5 5 5 5 2.5 5 30  4/23 2.7 At goal, no change 5 2.5 5 5 5 2.5 5 30  2/20 2.9 At goal, no change 5 2.5 5 5 5 2.5 5 30  1/22 1.8 Below goal, continue 5 2.5 5 5 5 2.5 5 30  12/5 2.5 At goal, no change 5 2.5 5 5 5 2.5 5 30  11/7 2.2 At goal, no change 5 2.5 5 5 5 2.5 5 30  10/3 2.2 At goal, no change 5 2.5 5 5 5 2.5 5 30  8/22 2.4 At goal, no change 5 2.5 5 5 5 2.5 5 30  7/18 2.3  At goal, no change 5 2.5 5 5 5 2.5 5 30  6/14 2.3 At goal, no change 5 2.5 5 5 5 2.5 5 30  5/7 3.1 Above goal, continue 5 2.5 5 5 5 2.5 5 30   4/1 1.9 Below goal, continue 5 2.5 5 5 5 2.5 5 30   3/1 2.7 At goal, no change 5 2.5 5 5 5 2.5 5 30  2/4 2.2 At goal, no change 5 2.5 5 5 5 2.5 5 30  12/17 2.2 At goal, no change 5 2.5 5 5 5 2.5 5 30  10/31 2.1 At goal, no change 5 2.5 5 5 5 2.5 5 30  9/27 2.5 At goal, no change 5 2.5 5 5 5 2.5 5 30  8/30 2.0 At goal, no change 5 2.5 5 5 5 2.5 5 30  8/1 1.8 Below goal, continue 5 2.5 5 5 5 2.5 5 30  6/27 2.3 At goal, no change 5 2.5 5 5 5 2.5 5 30  6/1 2.5 At goal, no change 5 2.5 5 5 5 2.5 5 30  5/2 3.1 Above goal, continue 5 2.5 5 5 5 2.5 5 30  4/4 2.2 At goal, no change 5 2.5 5 5 5 2.5 5 30  3/14 1.9 Below goal, continue 5 2.5 5 5 5 2.5 5 30  2/9 2.2 At goal, no change 5 2.5 5 5 5 2.5 5 30  1/11 2.0 At goal, no change 5 2.5 5 5 5 2.5 5 30  12/13 2.1 At goal, no change 5 2.5 5 5 5 2.5 5 30    Patient History:   Recent hospitalizations/HC visits 12/18 Dr. Ary Denver (pulm): no med changes   11/7 Dr. Giorgi Gregory: no med changes   Recent medication changes 6/4 - D/c ranitidine last month    Medications taken regularly that may interact with warfarin or alter INR No significant drug interactions identified   Warfarin dose taken as prescribed Yes; uses pillbox   Signs/symptoms of bleeding None reported    Vitamin K intake -Normally has ~2-3 servings/week (2/3 can of spinach)  -1/22: pt was on vacation and ate salads and spinach daily    Recent vomiting/diarrhea/fever, changes in weight or activity level None reported   Tobacco or alcohol use Denies use of either   Upcoming surgeries or procedures None reported     Assessment/Plan:   Patient's INR was therapeutic today (2.9). He denies any missed/extra warfarin doses, significant changes to diet or medications, and any unusual bleeding. He says he has not been eating quite as many greens recently due to quarantine, but will increase his vitamin K intake just a bit since his INR is at the top of his range. Patient prefers not to adjust warfarin dose, and to adjust vitamin K intake instead. Patient was instructed to continue stable warfarin regimen of 2.5 mg on Mon+Fri and 5 mg all other days. Repeat INR in 4 weeks. Patient was reminded to maintain consistent vitamin K intake and call with any bleeding, medication changes, or fever/vomiting/diarrhea. Patient understands dosing directions and information discussed. Dosing schedule and follow up appointment given to patient. Progress note routed to referring physician's office. Patient acknowledges working in consult agreement with pharmacist as referred by his/her physician. Next Clinic Appointment:  8/20    Please call Bin at [phone] with any questions. Thanks!   Dax Porter, PharmD  PGY1 Pharmacy Resident  [facility]   [facility] Ph: [phone]  7/23/2020 9:23 AM      CLINICAL PHARMACY CONSULT: MED RECONCILIATION/REVIEW ADDENDUM    For Pharmacy Admin Tracking Only    PHSO: Yes  Total # of Interventions Recommended: 0  - Maintenance Safety Lab Monitoring #: 1  Total Interventions Accepted: 0  Time Spent (min): 15

## 2020-08-20 ENCOUNTER — ANTI-COAG VISIT (OUTPATIENT)
Dept: PHARMACY | Age: 70
End: 2020-08-20
Payer: MEDICARE

## 2020-08-20 LAB — INTERNATIONAL NORMALIZATION RATIO, POC: 2.5

## 2020-08-20 PROCEDURE — 85610 PROTHROMBIN TIME: CPT

## 2020-08-20 PROCEDURE — 99211 OFF/OP EST MAY X REQ PHY/QHP: CPT

## 2020-08-20 NOTE — PROGRESS NOTES
ANTICOAGULATION SERVICE    Nikki Perez is a 69 y.o. male with PMHx significant for A-fib, internal hemorrhoids, DVT, lymphoma, subdural hematoma who presents to clinic 8/20/2020 for anticoagulation monitoring and adjustment.     Anticoagulation Indication(s):  Afib + h/o DVT  Referring Physician:  Dr. Yohana Posey  Goal INR Range:  2-3  Duration of Anticoagulation Therapy:  Indefinite  Time of day dose taken:  PM  Product patient has at home:  warfarin 5 mg    INR Summary                           Warfarin regimen (mg)  Date INR   A/P   Sun Mon Tue Wed Thu Fri Sat Mg/wk  8/20 2.5 At goal, no change 5 2.5 5 5 5 2.5 5 30  7/23 2.9 At goal, no change 5 2.5 5 5 5 2.5 5 30  6/4 2.4 At goal, no change 5 2.5 5 5 5 2.5 5 30  4/23 2.7 At goal, no change 5 2.5 5 5 5 2.5 5 30  2/20 2.9 At goal, no change 5 2.5 5 5 5 2.5 5 30  1/22 1.8 Below goal, continue 5 2.5 5 5 5 2.5 5 30  12/5 2.5 At goal, no change 5 2.5 5 5 5 2.5 5 30  11/7 2.2 At goal, no change 5 2.5 5 5 5 2.5 5 30  10/3 2.2 At goal, no change 5 2.5 5 5 5 2.5 5 30  8/22 2.4 At goal, no change 5 2.5 5 5 5 2.5 5 30  7/18 2.3  At goal, no change 5 2.5 5 5 5 2.5 5 30  6/14 2.3 At goal, no change 5 2.5 5 5 5 2.5 5 30  5/7 3.1 Above goal, continue 5 2.5 5 5 5 2.5 5 30   4/1 1.9 Below goal, continue 5 2.5 5 5 5 2.5 5 30   3/1 2.7 At goal, no change 5 2.5 5 5 5 2.5 5 30  2/4 2.2 At goal, no change 5 2.5 5 5 5 2.5 5 30  12/17 2.2 At goal, no change 5 2.5 5 5 5 2.5 5 30  10/31 2.1 At goal, no change 5 2.5 5 5 5 2.5 5 30  9/27 2.5 At goal, no change 5 2.5 5 5 5 2.5 5 30  8/30 2.0 At goal, no change 5 2.5 5 5 5 2.5 5 30  8/1 1.8 Below goal, continue 5 2.5 5 5 5 2.5 5 30  6/27 2.3 At goal, no change 5 2.5 5 5 5 2.5 5 30  6/1 2.5 At goal, no change 5 2.5 5 5 5 2.5 5 30  5/2 3.1 Above goal, continue 5 2.5 5 5 5 2.5 5 30  4/4 2.2 At goal, no change 5 2.5 5 5 5 2.5 5 30  3/14 1.9 Below goal, continue 5 2.5 5 5 5 2.5 5 30  2/9 2.2 At goal, no change 5 2.5 5 5 5 2.5 5 30  1/11 2.0 At goal, no change 5 2.5 5 5 5 2.5 5 30  12/13 2.1 At goal, no change 5 2.5 5 5 5 2.5 5 30    Patient History:   Recent hospitalizations/HC visits 7/23 Dr. Thompson Course: decrease sotalol   Recent medication changes 7/23 sotalol decreased to 40 mg BID   Medications taken regularly that may interact with warfarin or alter INR No significant drug interactions identified   Warfarin dose taken as prescribed Yes; uses pillbox   Signs/symptoms of bleeding None reported    Vitamin K intake -Normally has ~2-3 servings/week (2/3 can of spinach)  -1/22: pt was on vacation and ate salads and spinach daily    Recent vomiting/diarrhea/fever, changes in weight or activity level None reported   Tobacco or alcohol use Denies use of either   Upcoming surgeries or procedures None reported     Assessment/Plan:   Patient's INR was therapeutic today (2.5). He denies any missed/extra warfarin doses, diet changes, or bleeding since last visit. Sotalol dose was decreased in July, but it does not interact with warfarin. Patient prefers not to adjust warfarin dose, and to adjust vitamin K intake instead. Patient was instructed to continue stable warfarin regimen of 2.5 mg on Mon+Fri and 5 mg all other days. Repeat INR in 4 weeks. Patient was reminded to maintain consistent vitamin K intake and call with any bleeding, medication changes, or fever/vomiting/diarrhea. Patient understands dosing directions and information discussed. Dosing schedule and follow up appointment given to patient. Progress note routed to referring physician's office. Patient acknowledges working in consult agreement with pharmacist as referred by his/her physician. Next Clinic Appointment:  9/21    Please call Bin at [phone] with any questions. Thanks! Jacob Whitaker.  Diego Hernadez PharmD, [facility]  [facility]  Ph: [phone]  8/20/2020 10:52 AM    CLINICAL PHARMACY CONSULT: MED RECONCILIATION/REVIEW [address]. Tracking Only    PHSO: Yes  Total # of Interventions Recommended: 0  - Maintenance Safety Lab Monitoring #: 1  Total Interventions Accepted: 0  Time Spent (min): 15

## 2020-10-02 ENCOUNTER — ANTI-COAG VISIT (OUTPATIENT)
Dept: PHARMACY | Age: 70
End: 2020-10-02
Payer: MEDICARE

## 2020-10-02 ENCOUNTER — OFFICE VISIT (OUTPATIENT)
Dept: CARDIOLOGY CLINIC | Age: 70
End: 2020-10-02
Payer: MEDICARE

## 2020-10-02 VITALS
HEART RATE: 78 BPM | OXYGEN SATURATION: 98 % | TEMPERATURE: 97.1 F | WEIGHT: 220.8 LBS | DIASTOLIC BLOOD PRESSURE: 100 MMHG | SYSTOLIC BLOOD PRESSURE: 140 MMHG | BODY MASS INDEX: 28.35 KG/M2

## 2020-10-02 LAB — INTERNATIONAL NORMALIZATION RATIO, POC: 2.2

## 2020-10-02 PROCEDURE — 4040F PNEUMOC VAC/ADMIN/RCVD: CPT | Performed by: INTERNAL MEDICINE

## 2020-10-02 PROCEDURE — 1123F ACP DISCUSS/DSCN MKR DOCD: CPT | Performed by: INTERNAL MEDICINE

## 2020-10-02 PROCEDURE — G8417 CALC BMI ABV UP PARAM F/U: HCPCS | Performed by: INTERNAL MEDICINE

## 2020-10-02 PROCEDURE — 85610 PROTHROMBIN TIME: CPT

## 2020-10-02 PROCEDURE — 99214 OFFICE O/P EST MOD 30 MIN: CPT | Performed by: INTERNAL MEDICINE

## 2020-10-02 PROCEDURE — 99211 OFF/OP EST MAY X REQ PHY/QHP: CPT

## 2020-10-02 PROCEDURE — 3017F COLORECTAL CA SCREEN DOC REV: CPT | Performed by: INTERNAL MEDICINE

## 2020-10-02 PROCEDURE — G8484 FLU IMMUNIZE NO ADMIN: HCPCS | Performed by: INTERNAL MEDICINE

## 2020-10-02 PROCEDURE — G8427 DOCREV CUR MEDS BY ELIG CLIN: HCPCS | Performed by: INTERNAL MEDICINE

## 2020-10-02 PROCEDURE — 1036F TOBACCO NON-USER: CPT | Performed by: INTERNAL MEDICINE

## 2020-10-02 NOTE — PROGRESS NOTES
ANTICOAGULATION SERVICE    Sheryle Loron is a 69 y.o. male with PMHx significant for A-fib, internal hemorrhoids, DVT, lymphoma, subdural hematoma who presents to clinic 10/2/2020 for anticoagulation monitoring and adjustment.     Anticoagulation Indication(s):  Afib + h/o DVT  Referring Physician:  Dr. Ildefonso Wilson  Goal INR Range:  2-3  Duration of Anticoagulation Therapy:  Indefinite  Time of day dose taken:  PM  Product patient has at home:  warfarin 5 mg    INR Summary                           Warfarin regimen (mg)  Date INR   A/P   Sun Mon Tue Wed Thu Fri Sat Mg/wk  10/2 2.2 At goal, no change 5 2.5 5 5 5 2.5 5 30  8/20 2.5 At goal, no change 5 2.5 5 5 5 2.5 5 30  7/23 2.9 At goal, no change 5 2.5 5 5 5 2.5 5 30  6/4 2.4 At goal, no change 5 2.5 5 5 5 2.5 5 30  4/23 2.7 At goal, no change 5 2.5 5 5 5 2.5 5 30  2/20 2.9 At goal, no change 5 2.5 5 5 5 2.5 5 30  1/22 1.8 Below goal, continue 5 2.5 5 5 5 2.5 5 30  12/5 2.5 At goal, no change 5 2.5 5 5 5 2.5 5 30  11/7 2.2 At goal, no change 5 2.5 5 5 5 2.5 5 30  10/3 2.2 At goal, no change 5 2.5 5 5 5 2.5 5 30  8/22 2.4 At goal, no change 5 2.5 5 5 5 2.5 5 30  7/18 2.3  At goal, no change 5 2.5 5 5 5 2.5 5 30  6/14 2.3 At goal, no change 5 2.5 5 5 5 2.5 5 30  5/7 3.1 Above goal, continue 5 2.5 5 5 5 2.5 5 30   4/1 1.9 Below goal, continue 5 2.5 5 5 5 2.5 5 30   3/1 2.7 At goal, no change 5 2.5 5 5 5 2.5 5 30  2/4 2.2 At goal, no change 5 2.5 5 5 5 2.5 5 30  12/17 2.2 At goal, no change 5 2.5 5 5 5 2.5 5 30  10/31 2.1 At goal, no change 5 2.5 5 5 5 2.5 5 30  9/27 2.5 At goal, no change 5 2.5 5 5 5 2.5 5 30  8/30 2.0 At goal, no change 5 2.5 5 5 5 2.5 5 30  8/1 1.8 Below goal, continue 5 2.5 5 5 5 2.5 5 30  6/27 2.3 At goal, no change 5 2.5 5 5 5 2.5 5 30  6/1 2.5 At goal, no change 5 2.5 5 5 5 2.5 5 30  5/2 3.1 Above goal, continue 5 2.5 5 5 5 2.5 5 30  4/4 2.2 At goal, no change 5 2.5 5 5 5 2.5 5 30  3/14 1.9 Below goal, continue 5 2.5 5 5 5 2.5 5 30  2/9 2.2 At goal, Only    PHSO: Yes  Total # of Interventions Recommended: 0  - Maintenance Safety Lab Monitoring #: 1  Total Interventions Accepted: 0  Time Spent (min): 15

## 2020-10-02 NOTE — PROGRESS NOTES
of clubs or organizations: Not on file     Relationship status: Not on file    Intimate partner violence     Fear of current or ex partner: Not on file     Emotionally abused: Not on file     Physically abused: Not on file     Forced sexual activity: Not on file   Other Topics Concern    Not on file   Social History Narrative    Not on file        Patient has a family history includes Heart Disease in his mother. Patient  has a past medical history of AF (atrial fibrillation) ([facility] [address] Aortic valve disease, and Lymphoma ([facility] [address] Current Outpatient Medications   Medication Sig Dispense Refill    sotalol (BETAPACE) 80 MG tablet Take one tablet twice a day (Patient taking differently: Take 40 mg by mouth 2 times daily Take one tablet twice a day) 180 tablet 3    warfarin (COUMADIN) 5 MG tablet TAKE ONE-HALF TABLET BY MOUTH DAILY ON MONDAY AND FRIDAY, TAKE ONE TABLET BY MOUTH DAILY ON ALL OTHER DAYS 90 tablet 3     No current facility-administered medications for this visit. Vitals:    10/02/20 1048   BP: (!) 142/102   Pulse: 78   Temp: 97.1 °F (36.2 °C)   SpO2: 98%       Weight: 220 lb 12.8 oz (100.2 kg)      Review of Systems   Constitutional: Negative for activity change and fatigue. Respiratory: Negative for apnea, cough, choking, chest tightness and shortness of breath. Cardiovascular: Negative for chest pain, palpitations and leg swelling. No PND or orthopnea. No tachycardia. Gastrointestinal: Negative for abdominal distention. Musculoskeletal: Negative for myalgias. Neurological: Negative for dizziness, syncope and light-headedness. Psychiatric/Behavioral: Negative for behavioral problems, confusion and agitation. All other systems reviewed negative as done. Objective:   Physical Exam   Constitutional: He is oriented to person, place, and time. He appears well-developed and well-nourished. No distress. HENT:   Head: Normocephalic and atraumatic.    Eyes: Conjunctivae and EOM are normal. Right eye exhibits no discharge. Left eye exhibits no discharge. Neck: Normal range of motion. Neck supple. No JVD present. Cardiovascular:  irreg Rate rhythm, S1 normal, S2 normal and normal heart sounds. Exam reveals no gallop. Pulses:       Radial pulses are 2+ on the right side, and 2+ on the left side. Pulmonary/Chest: Effort normal and breath sounds normal. No respiratory distress. He has no wheezes. He has no rales. Abdominal: Soft. Bowel sounds are normal. No tenderness. Musculoskeletal: Normal range of motion. He exhibits no edema. Tr R  Neurological: He is alert and oriented to person, place, and time. Skin: Skin is warm and dry. Psychiatric: He has a normal mood and affect. His behavior is normal. Thought content normal.       Assessment:       Diagnosis Orders   1. Paroxysmal atrial fibrillation (HCC)     2. Palpitation     3. Nonrheumatic mitral valve regurgitation             Plan:      CV stable. Active. Rhythm stable. HR normal.  Will continue sotalol to 40 mg bid. No real changes with lower dose. Continues exercising. On AC. No bleeding issues. Remains compensated. No changes. Reviewed previous records and testing including echo 7/20. Continue to monitor. Follow up 4 months.

## 2020-11-02 ENCOUNTER — ANTI-COAG VISIT (OUTPATIENT)
Dept: PHARMACY | Age: 70
End: 2020-11-02
Payer: MEDICARE

## 2020-11-02 LAB — INTERNATIONAL NORMALIZATION RATIO, POC: 2.5

## 2020-11-02 PROCEDURE — 85610 PROTHROMBIN TIME: CPT

## 2020-11-02 PROCEDURE — 99211 OFF/OP EST MAY X REQ PHY/QHP: CPT

## 2020-11-02 NOTE — PROGRESS NOTES
ANTICOAGULATION SERVICE    Darrel Lackey is a 69 y.o. male with PMHx significant for A-fib, internal hemorrhoids, DVT, lymphoma, subdural hematoma who presents to clinic 11/2/2020 for anticoagulation monitoring and adjustment.     Anticoagulation Indication(s):  Afib + h/o DVT  Referring Physician:  Dr. Darvin Ramirez  Goal INR Range:  2-3  Duration of Anticoagulation Therapy:  Indefinite  Time of day dose taken:  PM  Product patient has at home:  warfarin 5 mg    INR Summary                           Warfarin regimen (mg)  Date INR   A/P   Sun Mon Tue Wed Thu Fri Sat Mg/wk  11/2 2.5 At goal, no change 5 2.5 5 5 5 2.5 5 30  10/2 2.2 At goal, no change 5 2.5 5 5 5 2.5 5 30  8/20 2.5 At goal, no change 5 2.5 5 5 5 2.5 5 30  7/23 2.9 At goal, no change 5 2.5 5 5 5 2.5 5 30  6/4 2.4 At goal, no change 5 2.5 5 5 5 2.5 5 30  4/23 2.7 At goal, no change 5 2.5 5 5 5 2.5 5 30  2/20 2.9 At goal, no change 5 2.5 5 5 5 2.5 5 30  1/22 1.8 Below goal, continue 5 2.5 5 5 5 2.5 5 30  12/5 2.5 At goal, no change 5 2.5 5 5 5 2.5 5 30  11/7 2.2 At goal, no change 5 2.5 5 5 5 2.5 5 30  10/3 2.2 At goal, no change 5 2.5 5 5 5 2.5 5 30  8/22 2.4 At goal, no change 5 2.5 5 5 5 2.5 5 30  7/18 2.3  At goal, no change 5 2.5 5 5 5 2.5 5 30  6/14 2.3 At goal, no change 5 2.5 5 5 5 2.5 5 30  5/7 3.1 Above goal, continue 5 2.5 5 5 5 2.5 5 30   4/1 1.9 Below goal, continue 5 2.5 5 5 5 2.5 5 30   3/1 2.7 At goal, no change 5 2.5 5 5 5 2.5 5 30  2/4 2.2 At goal, no change 5 2.5 5 5 5 2.5 5 30  12/17 2.2 At goal, no change 5 2.5 5 5 5 2.5 5 30  10/31 2.1 At goal, no change 5 2.5 5 5 5 2.5 5 30  9/27 2.5 At goal, no change 5 2.5 5 5 5 2.5 5 30  8/30 2.0 At goal, no change 5 2.5 5 5 5 2.5 5 30  8/1 1.8 Below goal, continue 5 2.5 5 5 5 2.5 5 30  6/27 2.3 At goal, no change 5 2.5 5 5 5 2.5 5 30  6/1 2.5 At goal, no change 5 2.5 5 5 5 2.5 5 30  5/2 3.1 Above goal, continue 5 2.5 5 5 5 2.5 5 30  4/4 2.2 At goal, no change 5 2.5 5 5 5 2.5 5 30  3/14 1.9 Below goal, continue 5 2.5 5 5 5 2.5 5 30  2/9 2.2 At goal, no change 5 2.5 5 5 5 2.5 5 30  1/11 2.0 At goal, no change 5 2.5 5 5 5 2.5 5 30  12/13 2.1 At goal, no change 5 2.5 5 5 5 2.5 5 30    Patient History:   Recent hospitalizations/HC visits 7/23 Dr. Harman Mcdonald: decrease sotalol   Recent medication changes None reported   Medications taken regularly that may interact with warfarin or alter INR No significant drug interactions identified   Warfarin dose taken as prescribed Yes; uses pillbox   Signs/symptoms of bleeding None reported    Vitamin K intake Normally has ~2-3 servings/week (2/3 can of spinach)   Recent vomiting/diarrhea/fever, changes in weight or activity level None reported   Tobacco or alcohol use Denies use of either   Upcoming surgeries or procedures None reported     Assessment/Plan:   Patient's INR was therapeutic today (2.5). He denies any missed/extra warfarin doses, diet changes, medication changes, or bleeding since last visit. Patient prefers not to adjust warfarin dose and to adjust vitamin K intake instead when INR is out of range. Patient was instructed to continue stable warfarin regimen of 2.5 mg on Mon+Fri and 5 mg all other days. Repeat INR in 4 weeks. Patient was reminded to maintain consistent vitamin K intake and call with any bleeding, medication changes, or fever/vomiting/diarrhea. Patient understands dosing directions and information discussed. Dosing schedule and follow up appointment given to patient. Progress note routed to referring physician's office. Patient acknowledges working in consult agreement with pharmacist as referred by his/her physician. Next Clinic Appointment:  11/30    Please call Bin at [phone] with any questions. Thanks! Wallace Perdomo.  Marianne AlejandraD, BCPS  [facility]  Ph: [phone]  11/2/2020 10:56 AM    CLINICAL PHARMACY CONSULT: MED RECONCILIATION/REVIEW ADDENDUM    For Pharmacy Admin Tracking Only    PHSO: Yes  Total # of Interventions Recommended: 0  - Maintenance Safety Lab Monitoring #: 1  Total Interventions Accepted: 0  Time Spent (min): 15

## 2020-11-30 ENCOUNTER — ANTI-COAG VISIT (OUTPATIENT)
Dept: PHARMACY | Age: 70
End: 2020-11-30
Payer: MEDICARE

## 2020-11-30 LAB — INTERNATIONAL NORMALIZATION RATIO, POC: 3.8

## 2020-11-30 PROCEDURE — 99212 OFFICE O/P EST SF 10 MIN: CPT

## 2020-11-30 PROCEDURE — 85610 PROTHROMBIN TIME: CPT

## 2020-11-30 NOTE — PROGRESS NOTES
ANTICOAGULATION SERVICE    Aniya Souza is a 69 y.o. male with PMHx significant for A-fib, DVT, internal hemorrhoids, subdural hematoma, lymphoma who presents to clinic 11/30/2020 for anticoagulation monitoring and adjustment.     Anticoagulation Indication(s):  Afib + h/o DVT  Referring Physician:  Dr. Andersen Poster  Goal INR Range:  2-3  Duration of Anticoagulation Therapy:  Indefinite  Time of day dose taken:  PM  Product patient has at home:  warfarin 5 mg    INR Summary                           Warfarin regimen (mg)  Date INR   A/P   Sun Mon Tue Wed Thu Fri Sat Mg/wk  11/30 3.8 Above goal, hold x 1 5 0/2.5 5 5 5 2.5 5 30  11/2 2.5 At goal, no change 5 2.5 5 5 5 2.5 5 30  10/2 2.2 At goal, no change 5 2.5 5 5 5 2.5 5 30  8/20 2.5 At goal, no change 5 2.5 5 5 5 2.5 5 30  7/23 2.9 At goal, no change 5 2.5 5 5 5 2.5 5 30  6/4 2.4 At goal, no change 5 2.5 5 5 5 2.5 5 30  4/23 2.7 At goal, no change 5 2.5 5 5 5 2.5 5 30  2/20 2.9 At goal, no change 5 2.5 5 5 5 2.5 5 30  1/22 1.8 Below goal, continue 5 2.5 5 5 5 2.5 5 30  12/5 2.5 At goal, no change 5 2.5 5 5 5 2.5 5 30  11/7 2.2 At goal, no change 5 2.5 5 5 5 2.5 5 30  10/3 2.2 At goal, no change 5 2.5 5 5 5 2.5 5 30  8/22 2.4 At goal, no change 5 2.5 5 5 5 2.5 5 30  7/18 2.3  At goal, no change 5 2.5 5 5 5 2.5 5 30  6/14 2.3 At goal, no change 5 2.5 5 5 5 2.5 5 30  5/7 3.1 Above goal, continue 5 2.5 5 5 5 2.5 5 30   4/1 1.9 Below goal, continue 5 2.5 5 5 5 2.5 5 30   3/1 2.7 At goal, no change 5 2.5 5 5 5 2.5 5 30  2/4 2.2 At goal, no change 5 2.5 5 5 5 2.5 5 30  12/17 2.2 At goal, no change 5 2.5 5 5 5 2.5 5 30  10/31 2.1 At goal, no change 5 2.5 5 5 5 2.5 5 30  9/27 2.5 At goal, no change 5 2.5 5 5 5 2.5 5 30  8/30 2.0 At goal, no change 5 2.5 5 5 5 2.5 5 30  8/1 1.8 Below goal, continue 5 2.5 5 5 5 2.5 5 30  6/27 2.3 At goal, no change 5 2.5 5 5 5 2.5 5 30  6/1 2.5 At goal, no change 5 2.5 5 5 5 2.5 5 30  5/2 3.1 Above goal, continue 5 2.5 5 5 5 2.5 5 30  4/4 2.2 At goal, no change 5 2.5 5 5 5 2.5 5 30  3/14 1.9 Below goal, continue 5 2.5 5 5 5 2.5 5 30  2/9 2.2 At goal, no change 5 2.5 5 5 5 2.5 5 30  1/11 2.0 At goal, no change 5 2.5 5 5 5 2.5 5 30  12/13 2.1 At goal, no change 5 2.5 5 5 5 2.5 5 30    Patient History:   Recent hospitalizations/HC visits 7/23 Dr. Cecilia Patton: decrease sotalol   Recent medication changes None reported   Medications taken regularly that may interact with warfarin or alter INR No significant drug interactions identified   Warfarin dose taken as prescribed Took 5 mg instead of 2.5 mg on 11/27   Signs/symptoms of bleeding None reported    Vitamin K intake Normally has ~2-3 servings/week (2/3 can of spinach)   Recent vomiting/diarrhea/fever, changes in weight or activity level None reported   Tobacco or alcohol use Denies use of either   Upcoming surgeries or procedures None reported     Assessment/Plan:   Patient's INR was supratherapeutic today (3.8), likely due to taking an extra 2.5 mg of warfarin on 11/27. He denies any diet changes, medication changes, illness, or bleeding since last visit. Patient prefers not to adjust warfarin dose and to adjust vitamin K intake instead when INR is out of range. Patient was instructed to hold warfarin dose today, then continue stable regimen of 2.5 mg on Mon+Fri and 5 mg all other days. Repeat INR in 2 weeks. Patient was reminded to maintain consistent vitamin K intake and call with any bleeding, medication changes, or fever/vomiting/diarrhea. Patient understands dosing directions and information discussed. Dosing schedule and follow up appointment given to patient. Progress note routed to referring physician's office. Patient acknowledges working in consult agreement with pharmacist as referred by his/her physician. Next Clinic Appointment:  12/17    Please call Bin at [phone] with any questions. Thanks! Servando Waite.  Edwar Latham, PharmD, BCPS  [facility]  : [phone]  11/30/2020 10:53 AM    CLINICAL PHARMACY CONSULT: MED RECONCILIATION/REVIEW ADDENDUM    For Pharmacy Admin Tracking Only    PHSO: Yes  Total # of Interventions Recommended: 1  - Decreased Dose #: 1  - Maintenance Safety Lab Monitoring #: 1  Total Interventions Accepted: 1  Time Spent (min): 15

## 2020-12-17 ENCOUNTER — VIRTUAL VISIT (OUTPATIENT)
Dept: PULMONOLOGY | Age: 70
End: 2020-12-17
Payer: MEDICARE

## 2020-12-17 ENCOUNTER — ANTI-COAG VISIT (OUTPATIENT)
Dept: PHARMACY | Age: 70
End: 2020-12-17
Payer: MEDICARE

## 2020-12-17 LAB — INTERNATIONAL NORMALIZATION RATIO, POC: 2.5

## 2020-12-17 PROCEDURE — 85610 PROTHROMBIN TIME: CPT

## 2020-12-17 PROCEDURE — 99211 OFF/OP EST MAY X REQ PHY/QHP: CPT

## 2020-12-17 PROCEDURE — 99443 PR PHYS/QHP TELEPHONE EVALUATION 21-30 MIN: CPT | Performed by: NURSE PRACTITIONER

## 2020-12-17 ASSESSMENT — SLEEP AND FATIGUE QUESTIONNAIRES
HOW LIKELY ARE YOU TO NOD OFF OR FALL ASLEEP WHILE SITTING QUIETLY AFTER LUNCH WITHOUT ALCOHOL: 0
HOW LIKELY ARE YOU TO NOD OFF OR FALL ASLEEP WHEN YOU ARE A PASSENGER IN A CAR FOR AN HOUR WITHOUT A BREAK: 0
HOW LIKELY ARE YOU TO NOD OFF OR FALL ASLEEP WHILE LYING DOWN TO REST IN THE AFTERNOON WHEN CIRCUMSTANCES PERMIT: 1
HOW LIKELY ARE YOU TO NOD OFF OR FALL ASLEEP WHILE SITTING AND READING: 0
HOW LIKELY ARE YOU TO NOD OFF OR FALL ASLEEP WHILE SITTING AND TALKING TO SOMEONE: 0
ESS TOTAL SCORE: 2
HOW LIKELY ARE YOU TO NOD OFF OR FALL ASLEEP WHILE WATCHING TV: 1
HOW LIKELY ARE YOU TO NOD OFF OR FALL ASLEEP IN A CAR, WHILE STOPPED FOR A FEW MINUTES IN TRAFFIC: 0
HOW LIKELY ARE YOU TO NOD OFF OR FALL ASLEEP WHILE SITTING INACTIVE IN A PUBLIC PLACE: 0

## 2020-12-17 NOTE — PROGRESS NOTES
ANTICOAGULATION SERVICE    Kandice Scott is a 69 y.o. male with PMHx significant for A-fib, DVT, internal hemorrhoids, subdural hematoma, lymphoma who presents to clinic 12/17/2020 for anticoagulation monitoring and adjustment.     Anticoagulation Indication(s):  Afib + h/o DVT  Referring Physician:  Dr. Cecilia Patton  Goal INR Range:  2-3  Duration of Anticoagulation Therapy:  Indefinite  Time of day dose taken:  AM  Product patient has at home:  warfarin 5 mg    INR Summary                           Warfarin regimen (mg)  Date INR   A/P   Sun Mon Tue Wed Thu Fri Sat Mg/wk  12/17 2.5 At goal, no change 5 2.5 5 5 5 2.5 5 30  11/30 3.8 Above goal, hold x 1 5 0/2.5 5 5 5 2.5 5 30  11/2 2.5 At goal, no change 5 2.5 5 5 5 2.5 5 30  10/2 2.2 At goal, no change 5 2.5 5 5 5 2.5 5 30  8/20 2.5 At goal, no change 5 2.5 5 5 5 2.5 5 30  7/23 2.9 At goal, no change 5 2.5 5 5 5 2.5 5 30  6/4 2.4 At goal, no change 5 2.5 5 5 5 2.5 5 30  4/23 2.7 At goal, no change 5 2.5 5 5 5 2.5 5 30  2/20 2.9 At goal, no change 5 2.5 5 5 5 2.5 5 30  1/22 1.8 Below goal, continue 5 2.5 5 5 5 2.5 5 30  12/5 2.5 At goal, no change 5 2.5 5 5 5 2.5 5 30  11/7 2.2 At goal, no change 5 2.5 5 5 5 2.5 5 30  10/3 2.2 At goal, no change 5 2.5 5 5 5 2.5 5 30  8/22 2.4 At goal, no change 5 2.5 5 5 5 2.5 5 30  7/18 2.3  At goal, no change 5 2.5 5 5 5 2.5 5 30  6/14 2.3 At goal, no change 5 2.5 5 5 5 2.5 5 30  5/7 3.1 Above goal, continue 5 2.5 5 5 5 2.5 5 30   4/1 1.9 Below goal, continue 5 2.5 5 5 5 2.5 5 30   3/1 2.7 At goal, no change 5 2.5 5 5 5 2.5 5 30  2/4 2.2 At goal, no change 5 2.5 5 5 5 2.5 5 30  12/17 2.2 At goal, no change 5 2.5 5 5 5 2.5 5 30  10/31 2.1 At goal, no change 5 2.5 5 5 5 2.5 5 30  9/27 2.5 At goal, no change 5 2.5 5 5 5 2.5 5 30  8/30 2.0 At goal, no change 5 2.5 5 5 5 2.5 5 30  8/1 1.8 Below goal, continue 5 2.5 5 5 5 2.5 5 30  6/27 2.3 At goal, no change 5 2.5 5 5 5 2.5 5 30  6/1 2.5 At goal, no change 5 2.5 5 5 5 2.5 5 30 Please call Bin at [phone] with any questions. Thanks! Alicia Hampton, PharmD, BCPS  [facility]  Ph: [phone]  12/17/2020 11:38 AM    CLINICAL PHARMACY CONSULT: MED RECONCILIATION/REVIEW ADDENDUM    For Pharmacy Admin Tracking Only    PHSO: Yes  Total # of Interventions Recommended: 0  - Maintenance Safety Lab Monitoring #: 1  Total Interventions Accepted: 0  Time Spent (min): 15

## 2020-12-17 NOTE — PROGRESS NOTES
Reymundo Calvert         : 1950    Diagnosis: [x] VERA (G47.33) [] CSA (G47.31) [] Apnea (G47.30)   Length of Need: [x] 12 Months [] 99 Months [] Other:    Machine (RENETTA!): [x] Respironics Dream Station      Auto [] ResMed AirSense     Auto [] Other:     []  CPAP () [] Bilevel ()   Mode: [] Auto [] Spontaneous    Mode: [] Auto [] Spontaneous                            Comfort Settings:   - Ramp Pressure:  cmH2O                                        - Ramp time: 15 min                                     -  Flex/EPR - 3 full time                                    - For ResMed Bilevel (TiMax-4 sec   TiMin- 0.2 sec)     Humidifier: [x] Heated ()        [x] Water chamber replacement ()/ 1 per 6 months        Mask:   [] Nasal () /1 per 3 months [x] Full Face () /1 per 3 months   [] Patient choice -Size and fit mask [x] Patient Choice - Size and fit mask   [] Dispense:  [] Dispense:    [] Headgear () / 1 per 3 months [x] Headgear () / 1 per 3 months   [] Replacement Nasal Cushion ()/2 per month [x] Interface Replacement ()/1 per month   [] Replacement Nasal Pillows ()/2 per month         Tubing: [x] Heated ()/1 per 3 months    [] Standard ()/1 per 3 months [] Other:           Filters: [x] Non-disposable ()/1 per 6 months     [x] Ultra-Fine, Disposable ()/2 per month        Miscellaneous: [] Chin Strap ()/ 1 per 6 months [] O2 bleed-in:       LPM   [] Oximetry on CPAP/Bilevel []  Other:    [x] Modem: ()         Start Order Date: 20    MEDICAL JUSTIFICATION:  I, the undersigned, certify that the above prescribed supplies are medically necessary for this patients wellbeing. In my opinion, the supplies are both reasonable and necessary in reference to accepted standards of medicalpractice in treatment of this patients condition.     Muriel Hodgkins, APRN - CNP      NPI: [id number]       Order Signed Date: 12/17/20    Electronically signed by LORETTA Bernal CNP on 12/17/2020 at 9:41 AM

## 2020-12-17 NOTE — ASSESSMENT & PLAN NOTE
Reviewed compliance download with pt. Supplies and parts as needed for his machine. These are medically necessary. Continue medications per his PCP and other physicians. Limit caffeine use after 3pm.  Encouraged him to work on weight loss through diet and exercise. Diagnoses of Gastroesophageal reflux disease, unspecified whether esophagitis present, Essential hypertension, and Paroxysmal atrial fibrillation ([facility] [address] were pertinent to this visit. The chronic medical conditions listed are directly related to the primary diagnosis listed above. The management of the primary diagnosis affects the secondary diagnosis and vice versa.

## 2020-12-17 NOTE — PROGRESS NOTES
Joy Go MD, FAASM, [facility]  Yani Douglas, MSN, RN, CNP     [address] [facility] SLEEP MEDICINE  [place] [address] [address] [facility] [address]  Dept: [phone]  Dept Fax: [phone]: [facility] SLEEP [facility]  [address] [place] [id number] [phone]    Subjective:     Patient ID: Royer Rausch is a 69 y.o. male. Chief Complaint   Patient presents with    Sleep Apnea       HPI:     Sleep Medicine Telephone Visit    Pursuant to the emergency declaration under the [facility], [facility] waiver authority and the [employer] Act this Telephone Visit was insisted, with patient's consent, to reduce the patient's risk of exposure to COVID-19 and provide continuity of care for an established patient. Services were provided through a synchronous discussion over a telephone and/or Video chat to substitute for in-person clinic visit, and coded as such. While patient is at home. Machine Modem/Download Info:  Compliance (hours/night): 9 hrs/night  Download AHI (/hour): 13.5 /HR  Average CPAP Pressure : 15.1 cmH2O           APAP - Settings  Pressure Min: 11 cmH2O  Pressure Max: 18 cmH2O                 Comfort Settings  Humidity Level (0-8): 3  Flex/EPR (0-3): 3 PAP Mask  Mask Type: Full Face mask  Clinically Relevant Leak: No     [place] - Total score: 2    Follow-up :     Last Visit : December 2019      Patient reports the listed chronic Co-morbidities: GERD, HTN, Afib   are well controlled and stable at this time.      Subjective Health Changes: None      Over Night Oximetry: [] Yes  [] No  [x] NA [] WNL   Using O2: [] Yes  [] No  [x] NA   Patient is compliant with the machine  [x] Yes  [] No   Feeling rested when using the machine   [x] Yes  [] No Pressure is comfortable with inspiration and expiration  [x] Yes  [] No     Noticed changes in pressure   [] Yes  [] No  [x] NA   Mask is fitting well  [x] Yes  [] No   Noting Mask Air Leak  [] Yes  [x] No   Having painful Aerophagia  [] Yes  [x] No   Nocturia   3  per night. Having  HA upon waking  [] Yes  [x] No   Dry mouth upon waking   Dry Nose  Dry Eyes  [x] Yes  [] No   Congestion upon waking   [] Yes  [x] No    Nose Bleeds  [] Yes  [x] No   Using Sleep Aides    [x] NA   Understands how to change humidification and/or tubing temperature for comfort while at home  [x] Yes  [] No     Difficulties falling asleep  [] Yes  [x] No   Difficulties staying asleep  [] Yes  [x] No   Approximate time to bed  10:30-11pm   Approximate wake time  7:30-7:45am   Taking Naps  occasional   If taking naps usual length  5-20 min    If taking naps using the machine  [] Yes  [x] No  [] NA [] With and With out    Drowsy when driving  [] Yes  [x] No     Does patient carry a DOT/CDL  [] Yes  [x] No     Does patient carry FAA/[employer] License   [] Yes  [x] No      Any concerns noted with the machine at this time  [] Yes  [x] No        Diagnosis Orders   1. Obstructive sleep apnea     2. Gastroesophageal reflux disease, unspecified whether esophagitis present     3. Essential hypertension     4. Paroxysmal atrial fibrillation (HCC)         The chronic medical conditions listed are directly related to the primary diagnosis listed above. The management of the primary diagnosis affects the secondary diagnosis and vice versa. Assessment/Plan:     Gastroesophageal reflux disease  Chronic- Stable. Cont meds per PCP and other physicians. Essential hypertension  Chronic- Stable. Cont meds per PCP and other physicians. Atrial fibrillation  Chronic- Stable. Cont meds per PCP and other physicians.       Obstructive sleep apnea Reviewed compliance download with pt. Supplies and parts as needed for his machine. These are medically necessary. Continue medications per his PCP and other physicians. Limit caffeine use after 3pm.  Encouraged him to work on weight loss through diet and exercise. Diagnoses of Gastroesophageal reflux disease, unspecified whether esophagitis present, Essential hypertension, and [facility]oxysmal atrial fibrillation ([facility] were pertinent to this visit. The chronic medical conditions listed are directly related to the primary diagnosis listed above. The management of the primary diagnosis affects the secondary diagnosis and vice versa. The primary encounter diagnosis was Obstructive sleep apnea. Diagnoses of Gastroesophageal reflux disease, unspecified whether esophagitis present, Essential hypertension, and Paroxysmal atrial fibrillation ([facility] [address] were also pertinent to this visit. The chronic medical conditions listed are directly related to the primary diagnosis listed above. The management of the primary diagnosis affects the secondary diagnosis and vice versa. - Educated patient and reviewed compliance download with pt.    -Supplies and parts as needed for his machine, these are medically necessary.    - Patient using [employer] for supplies  -Continue medications per his PCP and other physicians.   -Limit caffeine use after 3pm.    - Patient not able to access video feed. Visit completed via telephone communications. 25 min spent with patient.   - Educated on pressure needs and changes needed to control AHI (increase Pmin 13, Pmax 20)  -F/U: 12 month. No orders of the defined types were placed in this encounter. No orders of the defined types were placed in this encounter. No orders of the defined types were placed in this encounter.       John Hager, STEVO, RN, CNP

## 2021-01-15 ENCOUNTER — ANTI-COAG VISIT (OUTPATIENT)
Dept: PHARMACY | Age: 71
End: 2021-01-15
Payer: MEDICARE

## 2021-01-15 DIAGNOSIS — Z79.01 LONG TERM CURRENT USE OF ANTICOAGULANT THERAPY: ICD-10-CM

## 2021-01-15 DIAGNOSIS — I48.0 PAROXYSMAL ATRIAL FIBRILLATION (HCC): ICD-10-CM

## 2021-01-15 LAB — INTERNATIONAL NORMALIZATION RATIO, POC: 2.3

## 2021-01-15 PROCEDURE — 85610 PROTHROMBIN TIME: CPT

## 2021-01-15 PROCEDURE — 99211 OFF/OP EST MAY X REQ PHY/QHP: CPT

## 2021-01-15 NOTE — PROGRESS NOTES
ANTICOAGULATION SERVICE    Myke Cobb is a 69 y.o. male with PMHx significant for A-fib, DVT, internal hemorrhoids, subdural hematoma, lymphoma who presents to clinic 1/15/2021 for anticoagulation monitoring and adjustment.     Anticoagulation Indication(s):  Afib + h/o DVT  Referring Physician:  Dr. Remigio Priest  Goal INR Range:  2-3  Duration of Anticoagulation Therapy:  Indefinite  Time of day dose taken:  AM  Product patient has at home:  warfarin 5 mg    INR Summary                           Warfarin regimen (mg)  Date INR   A/P   Sun Mon Tue Wed Thu Fri Sat Mg/wk  1/15 2.3 At goal, no change 5 2.5 5 5 5 2.5 5 30  12/17 2.5 At goal, no change 5 2.5 5 5 5 2.5 5 30  11/30 3.8 Above goal, hold x 1 5 0/2.5 5 5 5 2.5 5 30  11/2 2.5 At goal, no change 5 2.5 5 5 5 2.5 5 30  10/2 2.2 At goal, no change 5 2.5 5 5 5 2.5 5 30  8/20 2.5 At goal, no change 5 2.5 5 5 5 2.5 5 30  7/23 2.9 At goal, no change 5 2.5 5 5 5 2.5 5 30  6/4 2.4 At goal, no change 5 2.5 5 5 5 2.5 5 30  4/23 2.7 At goal, no change 5 2.5 5 5 5 2.5 5 30  2/20 2.9 At goal, no change 5 2.5 5 5 5 2.5 5 30  1/22 1.8 Below goal, continue 5 2.5 5 5 5 2.5 5 30  12/5 2.5 At goal, no change 5 2.5 5 5 5 2.5 5 30  11/7 2.2 At goal, no change 5 2.5 5 5 5 2.5 5 30  10/3 2.2 At goal, no change 5 2.5 5 5 5 2.5 5 30  8/22 2.4 At goal, no change 5 2.5 5 5 5 2.5 5 30  7/18 2.3  At goal, no change 5 2.5 5 5 5 2.5 5 30  6/14 2.3 At goal, no change 5 2.5 5 5 5 2.5 5 30  5/7 3.1 Above goal, continue 5 2.5 5 5 5 2.5 5 30   4/1 1.9 Below goal, continue 5 2.5 5 5 5 2.5 5 30   3/1 2.7 At goal, no change 5 2.5 5 5 5 2.5 5 30  2/4 2.2 At goal, no change 5 2.5 5 5 5 2.5 5 30  12/17 2.2 At goal, no change 5 2.5 5 5 5 2.5 5 30  10/31 2.1 At goal, no change 5 2.5 5 5 5 2.5 5 30  9/27 2.5 At goal, no change 5 2.5 5 5 5 2.5 5 30  8/30 2.0 At goal, no change 5 2.5 5 5 5 2.5 5 30  8/1 1.8 Below goal, continue 5 2.5 5 5 5 2.5 5 30  6/27 2.3 At goal, no change 5 2.5 5 5 5 2.5 5 30 6/1 2.5 At goal, no change 5 2.5 5 5 5 2.5 5 30  5/2 3.1 Above goal, continue 5 2.5 5 5 5 2.5 5 30  4/4 2.2 At goal, no change 5 2.5 5 5 5 2.5 5 30  3/14 1.9 Below goal, continue 5 2.5 5 5 5 2.5 5 30  2/9 2.2 At goal, no change 5 2.5 5 5 5 2.5 5 30  1/11 2.0 At goal, no change 5 2.5 5 5 5 2.5 5 30    Patient History:   Recent hospitalizations/HC visits 10/2 Dr. Gibson Yancy: no med changes   Recent medication changes None reported   Medications taken regularly that may interact with warfarin or alter INR No significant drug interactions identified   Warfarin dose taken as prescribed Yes   Signs/symptoms of bleeding None reported    Vitamin K intake Normally has ~2-3 servings/week (2/3 can of spinach)   Recent vomiting/diarrhea/fever, changes in weight or activity level None reported   Tobacco or alcohol use Denies use of either   Upcoming surgeries or procedures None reported     Assessment/Plan:   Patient's INR was therapeutic today (2.3). INR on 11/30 was elevated, likely due to taking an extra 2.5 mg of warfarin on 11/27. Today, patient denies any warfarin dosing errors, diet changes, medication changes, illness, or bleeding. Patient prefers to adjust vitamin K intake instead of warfarin dose if possible when INR is out of range. Patient was instructed to continue stable warfarin regimen of 2.5 mg on Mon+Fri, and 5 mg on all other days. Repeat INR in 5 weeks. Patient was reminded to maintain consistent vitamin K intake and call with any bleeding, medication changes, or fever/vomiting/diarrhea. Patient understands dosing directions and information discussed. Dosing schedule and follow up appointment given to patient. Progress note routed to referring physician's office. Patient acknowledges working in consult agreement with pharmacist as referred by his/her physician. Next Clinic Appointment:  2/18    Please call Bin at [phone] with any questions. Thanks! Raghavendra Leonard.  Alberto Salinas, PharmD, BCPS  [facility]  Ph: [phone]  1/15/2021 10:27 AM    CLINICAL PHARMACY CONSULT: MED RECONCILIATION/REVIEW ADDENDUM    For Pharmacy Admin Tracking Only    PHSO: Yes  Total # of Interventions Recommended: 0  - Maintenance Safety Lab Monitoring #: 1  Total Interventions Accepted: 0  Time Spent (min): 15

## 2021-02-05 ENCOUNTER — OFFICE VISIT (OUTPATIENT)
Dept: CARDIOLOGY CLINIC | Age: 71
End: 2021-02-05
Payer: MEDICARE

## 2021-02-05 VITALS
SYSTOLIC BLOOD PRESSURE: 110 MMHG | TEMPERATURE: 96.8 F | DIASTOLIC BLOOD PRESSURE: 70 MMHG | BODY MASS INDEX: 28.37 KG/M2 | WEIGHT: 221 LBS | HEART RATE: 72 BPM

## 2021-02-05 DIAGNOSIS — R00.2 PALPITATION: ICD-10-CM

## 2021-02-05 DIAGNOSIS — I48.0 PAROXYSMAL ATRIAL FIBRILLATION (HCC): Primary | ICD-10-CM

## 2021-02-05 DIAGNOSIS — I34.0 NONRHEUMATIC MITRAL VALVE REGURGITATION: ICD-10-CM

## 2021-02-05 PROCEDURE — G8427 DOCREV CUR MEDS BY ELIG CLIN: HCPCS | Performed by: INTERNAL MEDICINE

## 2021-02-05 PROCEDURE — 1036F TOBACCO NON-USER: CPT | Performed by: INTERNAL MEDICINE

## 2021-02-05 PROCEDURE — G8484 FLU IMMUNIZE NO ADMIN: HCPCS | Performed by: INTERNAL MEDICINE

## 2021-02-05 PROCEDURE — 3017F COLORECTAL CA SCREEN DOC REV: CPT | Performed by: INTERNAL MEDICINE

## 2021-02-05 PROCEDURE — G8417 CALC BMI ABV UP PARAM F/U: HCPCS | Performed by: INTERNAL MEDICINE

## 2021-02-05 PROCEDURE — 4040F PNEUMOC VAC/ADMIN/RCVD: CPT | Performed by: INTERNAL MEDICINE

## 2021-02-05 PROCEDURE — 1123F ACP DISCUSS/DSCN MKR DOCD: CPT | Performed by: INTERNAL MEDICINE

## 2021-02-05 PROCEDURE — 99214 OFFICE O/P EST MOD 30 MIN: CPT | Performed by: INTERNAL MEDICINE

## 2021-02-05 NOTE — PROGRESS NOTES
Subjective:      Patient ID: Rocío Smith is a 69 y.o. male. HPI Svetlana Hawkins is here today for follow up afib/palp/[place] Had first covid. No complaints. Continues exercising. Rhythm good. No sob. No edema. No pnd or orthopnea. No syncope. No syncope. Wt down.        Past Medical History:   Diagnosis Date    AF (atrial fibrillation) (HCC)     Aortic valve disease     leaking no problems    [facility])      Past Surgical History:   Procedure Laterality Date    APPENDECTOMY      COLONOSCOPY  8/18/2006     Dr. Jonathan De La Rosa - internal hemorrhoids , hyperplastic polyp     CYSTOSCOPY  1-11-10     Dr. Deshawn Garner ureteral stent     LYMPH NODE BIOPSY  5/11/11    RIGHT CERVICAL    LYMPH NODE DISSECTION N/A 2011    TONSILLECTOMY           Allergies   Allergen Reactions    Gentamicin Other (See Comments)     Dad had reaction        Social History     Socioeconomic History    Marital status:      Spouse name: Not on file    Number of children: 0    Years of education: Not on file    Highest education level: Not on file   Occupational History    Occupation: retired   Social Needs    Financial resource strain: Not on file    Food insecurity     Worry: Not on file     Inability: Not on file   [place] needs     Medical: Not on file     Non-medical: Not on file   Tobacco Use    Smoking status: Never Smoker    Smokeless tobacco: Never Used   Substance and Sexual Activity    Alcohol use: No     Alcohol/week: 0.0 standard drinks    Drug use: No    Sexual activity: Yes     Partners: Female   Lifestyle    Physical activity     Days per week: Not on file     Minutes per session: Not on file    Stress: Not on file   Relationships    Social connections     Talks on phone: Not on file     Gets together: Not on file     Attends Christianity service: Not on file     Active member of club or organization: Not on file     Attends meetings of clubs or organizations: Not on file     Relationship status: Not on file    Intimate partner violence     Fear of current or ex partner: Not on file     Emotionally abused: Not on file     Physically abused: Not on file     Forced sexual activity: Not on file   Other Topics Concern    Not on file   Social History Narrative    Not on file        Patient has a family history includes Heart Disease in his mother. Patient  has a past medical history of AF (atrial fibrillation) ([facility] [address] Aortic valve disease, and Lymphoma ([facility] [address] Current Outpatient Medications   Medication Sig Dispense Refill    sotalol (BETAPACE) 80 MG tablet Take one tablet twice a day (Patient taking differently: Take 40 mg by mouth 2 times daily Take one tablet twice a day) 180 tablet 3    warfarin (COUMADIN) 5 MG tablet TAKE ONE-HALF TABLET BY MOUTH DAILY ON MONDAY AND FRIDAY, TAKE ONE TABLET BY MOUTH DAILY ON ALL OTHER DAYS 90 tablet 3     No current facility-administered medications for this visit. Vitals:    02/05/21 1024   BP: 110/70   Pulse: 72   Temp: 96.8 °F (36 °C)         Review of Systems   Constitutional: Negative for activity change and fatigue. Respiratory: Negative for apnea, cough, choking, chest tightness and shortness of breath. Cardiovascular: Negative for chest pain, palpitations and leg swelling. No PND or orthopnea. No tachycardia. Gastrointestinal: Negative for abdominal distention. Musculoskeletal: Negative for myalgias. Neurological: Negative for dizziness, syncope and light-headedness. Psychiatric/Behavioral: Negative for behavioral problems, confusion and agitation. All other systems reviewed negative as done. Objective:   Physical Exam   Constitutional: He is oriented to person, place, and time. He appears well-developed and well-nourished. No distress. HENT:   Head: Normocephalic and atraumatic. Eyes: Conjunctivae and EOM are normal. Right eye exhibits no discharge. Left eye exhibits no discharge. Neck: Normal range of motion.  Neck supple. No JVD present. Cardiovascular:  irreg Rate rhythm, S1 normal, S2 normal and normal heart sounds. Exam reveals no gallop. Pulses:       Radial pulses are 2+ on the right side, and 2+ on the left side. Pulmonary/Chest: Effort normal and breath sounds normal. No respiratory distress. He has no wheezes. He has no rales. Abdominal: Soft. Bowel sounds are normal. No tenderness. Musculoskeletal: Normal range of motion. He exhibits no edema. Tr R  Neurological: He is alert and oriented to person, place, and time. Skin: Skin is warm and dry. Psychiatric: He has a normal mood and affect. His behavior is normal. Thought content normal.       Assessment:       Diagnosis Orders   1. Paroxysmal atrial fibrillation (HCC)     2. Palpitation     3. Nonrheumatic mitral valve regurgitation             Plan:      CV stable. Active. Rhythm stable. HR normal.  Will continue sotalol to 40 mg bid. No real changes with lower dose. Continues exercising. On AC. No bleeding issues. Remains compensated. No changes. Reviewed previous records and testing including echo 7/20. Continue to monitor. Follow up 4 months.

## 2021-02-16 ENCOUNTER — TELEPHONE (OUTPATIENT)
Dept: PHARMACY | Age: 71
End: 2021-02-16

## 2021-02-16 NOTE — TELEPHONE ENCOUNTER
Pt LVM to cxl INR check on 2/18/21. Called patient back and LVM to reschedule.      Lesvia Pierre, PharmD, [facility]  Medication Management Clinic   Richie [address] Ph: [phone]  [place]ace [place] Ph: [phone]  2/16/2021 10:48 AM

## 2021-02-17 NOTE — TELEPHONE ENCOUNTER
Patient rescheduled for 2/24 @ 1045. Indy Aguayo.  Michael AlejandraD, BCPS  [facility]  Ph: [phone]  2/17/2021 12:46 PM

## 2021-02-24 ENCOUNTER — ANTI-COAG VISIT (OUTPATIENT)
Dept: PHARMACY | Age: 71
End: 2021-02-24
Payer: MEDICARE

## 2021-02-24 DIAGNOSIS — I48.0 PAROXYSMAL ATRIAL FIBRILLATION (HCC): ICD-10-CM

## 2021-02-24 DIAGNOSIS — Z79.01 LONG TERM CURRENT USE OF ANTICOAGULANT THERAPY: ICD-10-CM

## 2021-02-24 LAB — INTERNATIONAL NORMALIZATION RATIO, POC: 2.1

## 2021-02-24 PROCEDURE — 85610 PROTHROMBIN TIME: CPT

## 2021-02-24 PROCEDURE — 99211 OFF/OP EST MAY X REQ PHY/QHP: CPT

## 2021-02-24 NOTE — PROGRESS NOTES
ANTICOAGULATION SERVICE    Monserrat Krueger is a 69 y.o. male with PMHx significant for A-fib, DVT, internal hemorrhoids, subdural hematoma, lymphoma who presents to clinic 2/24/2021 for anticoagulation monitoring and adjustment.     Anticoagulation Indication(s):  Afib + h/o DVT  Referring Physician:  Dr. Randy Keene  Goal INR Range:  2-3  Duration of Anticoagulation Therapy:  Indefinite  Time of day dose taken:  AM  Product patient has at home:  warfarin 5 mg    INR Summary                           Warfarin regimen (mg)  Date INR   A/P   Sun Mon Tue Wed Thu Fri Sat Mg/wk  2/24 2.1 At goal, no change 5 2.5 5 5 5 2.5 5 30  1/15 2.3 At goal, no change 5 2.5 5 5 5 2.5 5 30  12/17 2.5 At goal, no change 5 2.5 5 5 5 2.5 5 30  11/30 3.8 Above goal, hold x 1 5 0/2.5 5 5 5 2.5 5 30  11/2 2.5 At goal, no change 5 2.5 5 5 5 2.5 5 30  10/2 2.2 At goal, no change 5 2.5 5 5 5 2.5 5 30  8/20 2.5 At goal, no change 5 2.5 5 5 5 2.5 5 30  7/23 2.9 At goal, no change 5 2.5 5 5 5 2.5 5 30  6/4 2.4 At goal, no change 5 2.5 5 5 5 2.5 5 30  4/23 2.7 At goal, no change 5 2.5 5 5 5 2.5 5 30  2/20 2.9 At goal, no change 5 2.5 5 5 5 2.5 5 30  1/22 1.8 Below goal, continue 5 2.5 5 5 5 2.5 5 30  12/5 2.5 At goal, no change 5 2.5 5 5 5 2.5 5 30  11/7 2.2 At goal, no change 5 2.5 5 5 5 2.5 5 30  10/3 2.2 At goal, no change 5 2.5 5 5 5 2.5 5 30  8/22 2.4 At goal, no change 5 2.5 5 5 5 2.5 5 30  7/18 2.3  At goal, no change 5 2.5 5 5 5 2.5 5 30  6/14 2.3 At goal, no change 5 2.5 5 5 5 2.5 5 30  5/7 3.1 Above goal, continue 5 2.5 5 5 5 2.5 5 30   4/1 1.9 Below goal, continue 5 2.5 5 5 5 2.5 5 30   3/1 2.7 At goal, no change 5 2.5 5 5 5 2.5 5 30  2/4 2.2 At goal, no change 5 2.5 5 5 5 2.5 5 30  12/17 2.2 At goal, no change 5 2.5 5 5 5 2.5 5 30  10/31 2.1 At goal, no change 5 2.5 5 5 5 2.5 5 30  9/27 2.5 At goal, no change 5 2.5 5 5 5 2.5 5 30  8/30 2.0 At goal, no change 5 2.5 5 5 5 2.5 5 30  8/1 1.8 Below goal, continue 5 2.5 5 5 5 2.5 5 30  6/27 2.3 At goal, no change 5 2.5 5 5 5 2.5 5 30  6/1 2.5 At goal, no change 5 2.5 5 5 5 2.5 5 30  5/2 3.1 Above goal, continue 5 2.5 5 5 5 2.5 5 30  4/4 2.2 At goal, no change 5 2.5 5 5 5 2.5 5 30  3/14 1.9 Below goal, continue 5 2.5 5 5 5 2.5 5 30  2/9 2.2 At goal, no change 5 2.5 5 5 5 2.5 5 30  1/11 2.0 At goal, no change 5 2.5 5 5 5 2.5 5 30    Patient History:   Recent hospitalizations/HC visits 2/5 Dr. Lauren Awad: no med changes   Recent medication changes None reported   Medications taken regularly that may interact with warfarin or alter INR No significant drug interactions identified   Warfarin dose taken as prescribed Yes   Signs/symptoms of bleeding None reported    Vitamin K intake Normally has ~2-3 servings/week (2/3 can of spinach)   Recent vomiting/diarrhea/fever, changes in weight or activity level None reported   Tobacco or alcohol use Denies use of either   Upcoming surgeries or procedures None reported     Assessment/Plan:   Patient's INR was therapeutic today (2.1). Patient denies any warfarin dosing errors, diet changes, medication changes, illness, or bleeding. Patient prefers to adjust vitamin K intake instead of warfarin dose if possible when INR is out of range. Patient was instructed to continue stable warfarin regimen of 2.5 mg on Mon+Fri, and 5 mg on all other days. Repeat INR in 6 weeks. Patient was reminded to maintain consistent vitamin K intake and call with any bleeding, medication changes, or fever/vomiting/diarrhea. Patient understands dosing directions and information discussed. Dosing schedule and follow up appointment given to patient. Progress note routed to referring physician's office. Patient acknowledges working in consult agreement with pharmacist as referred by his/her physician. Next Clinic Appointment:  4/7    Please call Bin at [phone] with any questions. Thanks! Florentino Ball.  [facility], PharmD, BCPS  [facility]  Ph: [phone]  2/24/2021 10:48 AM    CLINICAL PHARMACY CONSULT: MED RECONCILIATION/REVIEW ADDENDUM    For Pharmacy Admin Tracking Only    PHSO: Yes  Total # of Interventions Recommended: 0  - Maintenance Safety Lab Monitoring #: 1  Total Interventions Accepted: 0  Time Spent (min): 15

## 2021-03-12 RX ORDER — WARFARIN SODIUM 5 MG/1
TABLET ORAL
Qty: 90 TABLET | Refills: 3 | Status: SHIPPED | OUTPATIENT
Start: 2021-03-12 | End: 2022-04-14

## 2021-04-09 ENCOUNTER — ANTI-COAG VISIT (OUTPATIENT)
Dept: PHARMACY | Age: 71
End: 2021-04-09
Payer: MEDICARE

## 2021-04-09 DIAGNOSIS — Z79.01 LONG TERM CURRENT USE OF ANTICOAGULANT THERAPY: ICD-10-CM

## 2021-04-09 DIAGNOSIS — I48.0 PAROXYSMAL ATRIAL FIBRILLATION (HCC): ICD-10-CM

## 2021-04-09 LAB — INTERNATIONAL NORMALIZATION RATIO, POC: 2.4

## 2021-04-09 PROCEDURE — 85610 PROTHROMBIN TIME: CPT

## 2021-04-09 PROCEDURE — 99211 OFF/OP EST MAY X REQ PHY/QHP: CPT

## 2021-04-09 NOTE — PROGRESS NOTES
ANTICOAGULATION SERVICE    Saran Beltran is a 69 y.o. male with PMHx significant for A-fib, DVT, internal hemorrhoids, subdural hematoma, lymphoma who presents to clinic 4/9/2021 for anticoagulation monitoring and adjustment.     Anticoagulation Indication(s):  Afib + h/o DVT  Referring Physician:  Dr. Basilio Gilbert  Goal INR Range:  2-3  Duration of Anticoagulation Therapy:  Indefinite  Time of day dose taken:  AM  Product patient has at home:  warfarin 5 mg    INR Summary                           Warfarin regimen (mg)  Date INR   A/P   Sun Mon Tue Wed Thu Fri Sat Mg/wk  4/9 2.4 At goal, no change 5 2.5 5 5 5 2.5 5 30  2/24 2.1 At goal, no change 5 2.5 5 5 5 2.5 5 30  1/15 2.3 At goal, no change 5 2.5 5 5 5 2.5 5 30  12/17 2.5 At goal, no change 5 2.5 5 5 5 2.5 5 30  11/30 3.8 Above goal, hold x 1 5 0/2.5 5 5 5 2.5 5 30  11/2 2.5 At goal, no change 5 2.5 5 5 5 2.5 5 30  10/2 2.2 At goal, no change 5 2.5 5 5 5 2.5 5 30  8/20 2.5 At goal, no change 5 2.5 5 5 5 2.5 5 30  7/23 2.9 At goal, no change 5 2.5 5 5 5 2.5 5 30  6/4 2.4 At goal, no change 5 2.5 5 5 5 2.5 5 30  4/23 2.7 At goal, no change 5 2.5 5 5 5 2.5 5 30  2/20 2.9 At goal, no change 5 2.5 5 5 5 2.5 5 30  1/22 1.8 Below goal, continue 5 2.5 5 5 5 2.5 5 30  12/5 2.5 At goal, no change 5 2.5 5 5 5 2.5 5 30  11/7 2.2 At goal, no change 5 2.5 5 5 5 2.5 5 30  10/3 2.2 At goal, no change 5 2.5 5 5 5 2.5 5 30  8/22 2.4 At goal, no change 5 2.5 5 5 5 2.5 5 30  7/18 2.3  At goal, no change 5 2.5 5 5 5 2.5 5 30  6/14 2.3 At goal, no change 5 2.5 5 5 5 2.5 5 30  5/7 3.1 Above goal, continue 5 2.5 5 5 5 2.5 5 30   4/1 1.9 Below goal, continue 5 2.5 5 5 5 2.5 5 30   3/1 2.7 At goal, no change 5 2.5 5 5 5 2.5 5 30  2/4 2.2 At goal, no change 5 2.5 5 5 5 2.5 5 30  12/17 2.2 At goal, no change 5 2.5 5 5 5 2.5 5 30  10/31 2.1 At goal, no change 5 2.5 5 5 5 2.5 5 30  9/27 2.5 At goal, no change 5 2.5 5 5 5 2.5 5 30  8/30 2.0 At goal, no change 5 2.5 5 5 5 2.5 5 30  8/1 1.8 Below goal, continue 5 2.5 5 5 5 2.5 5 30  6/27 2.3 At goal, no change 5 2.5 5 5 5 2.5 5 30  6/1 2.5 At goal, no change 5 2.5 5 5 5 2.5 5 30  5/2 3.1 Above goal, continue 5 2.5 5 5 5 2.5 5 30  4/4 2.2 At goal, no change 5 2.5 5 5 5 2.5 5 30  3/14 1.9 Below goal, continue 5 2.5 5 5 5 2.5 5 30  2/9 2.2 At goal, no change 5 2.5 5 5 5 2.5 5 30  1/11 2.0 At goal, no change 5 2.5 5 5 5 2.5 5 30    Patient History:   Recent hospitalizations/HC visits 2/5 Dr. Sam Mata: no med changes   Recent medication changes None reported   Medications taken regularly that may interact with warfarin or alter INR No significant drug interactions identified   Warfarin dose taken as prescribed Yes   Signs/symptoms of bleeding None reported    Vitamin K intake Normally has ~2-3 servings/week (2/3 can of spinach)   Recent vomiting/diarrhea/fever, changes in weight or activity level None reported   Tobacco or alcohol use Denies use of either   Upcoming surgeries or procedures None reported     Assessment/Plan:   Patient's INR was therapeutic today (2.4). Patient denies any warfarin dosing errors, diet changes, medication changes, illness, or bleeding. Patient prefers to adjust vitamin K intake instead of warfarin dose if possible when INR is out of range. Patient was instructed to continue stable warfarin regimen of 2.5 mg on Mon+Fri, and 5 mg on all other days. Repeat INR in 4 weeks. Patient was reminded to maintain consistent vitamin K intake and call with any bleeding, medication changes, or fever/vomiting/diarrhea. Patient understands dosing directions and information discussed. Dosing schedule and follow up appointment given to patient. Progress note routed to referring physician's office. Patient acknowledges working in consult agreement with pharmacist as referred by his/her physician. Next Clinic Appointment:  5/7    Please call Bin at [phone] with any questions. Thanks! Jodee Jean-Baptiste.  Danna Stout, PharmD, BCPS

## 2021-05-07 ENCOUNTER — ANTI-COAG VISIT (OUTPATIENT)
Dept: PHARMACY | Age: 71
End: 2021-05-07
Payer: MEDICARE

## 2021-05-07 LAB — INTERNATIONAL NORMALIZATION RATIO, POC: 3.4

## 2021-05-07 PROCEDURE — 85610 PROTHROMBIN TIME: CPT | Performed by: PHARMACIST

## 2021-05-07 PROCEDURE — 99212 OFFICE O/P EST SF 10 MIN: CPT | Performed by: PHARMACIST

## 2021-05-07 NOTE — PROGRESS NOTES
ANTICOAGULATION SERVICE    Cristo Luu is a 69 y.o. male with PMHx significant for A-fib, DVT, internal hemorrhoids, subdural hematoma, lymphoma who presents to clinic 5/7/2021 for anticoagulation monitoring and adjustment.     Anticoagulation Indication(s):  Afib + h/o DVT  Referring Physician:  Dr. Halina Quiroz  Goal INR Range:  2-3  Duration of Anticoagulation Therapy:  Indefinite  Time of day dose taken:  AM  Product patient has at home:  warfarin 5 mg    INR Summary                           Warfarin regimen (mg)  Date INR   A/P   Sun Mon Tue Wed Thu Fri Sat Mg/wk  5/7 3.4 Above goal, hold x1 5 2.5 5 5 5 0/2.5 5 30  4/9 2.4 At goal, no change 5 2.5 5 5 5 2.5 5 30  2/24 2.1 At goal, no change 5 2.5 5 5 5 2.5 5 30  1/15 2.3 At goal, no change 5 2.5 5 5 5 2.5 5 30  12/17 2.5 At goal, no change 5 2.5 5 5 5 2.5 5 30  11/30 3.8 Above goal, hold x 1 5 0/2.5 5 5 5 2.5 5 30  11/2 2.5 At goal, no change 5 2.5 5 5 5 2.5 5 30  10/2 2.2 At goal, no change 5 2.5 5 5 5 2.5 5 30  8/20 2.5 At goal, no change 5 2.5 5 5 5 2.5 5 30  7/23 2.9 At goal, no change 5 2.5 5 5 5 2.5 5 30  6/4 2.4 At goal, no change 5 2.5 5 5 5 2.5 5 30  4/23 2.7 At goal, no change 5 2.5 5 5 5 2.5 5 30  2/20 2.9 At goal, no change 5 2.5 5 5 5 2.5 5 30  1/22 1.8 Below goal, continue 5 2.5 5 5 5 2.5 5 30  12/5 2.5 At goal, no change 5 2.5 5 5 5 2.5 5 30  11/7 2.2 At goal, no change 5 2.5 5 5 5 2.5 5 30  10/3 2.2 At goal, no change 5 2.5 5 5 5 2.5 5 30  8/22 2.4 At goal, no change 5 2.5 5 5 5 2.5 5 30  7/18 2.3  At goal, no change 5 2.5 5 5 5 2.5 5 30  6/14 2.3 At goal, no change 5 2.5 5 5 5 2.5 5 30  5/7 3.1 Above goal, continue 5 2.5 5 5 5 2.5 5 30   4/1 1.9 Below goal, continue 5 2.5 5 5 5 2.5 5 30   3/1 2.7 At goal, no change 5 2.5 5 5 5 2.5 5 30  2/4 2.2 At goal, no change 5 2.5 5 5 5 2.5 5 30  12/17 2.2 At goal, no change 5 2.5 5 5 5 2.5 5 30  10/31 2.1 At goal, no change 5 2.5 5 5 5 2.5 5 30  9/27 2.5 At goal, no change 5 2.5 5 5 5 2.5 5 30  8/30 2.0 At goal, no change 5 2.5 5 5 5 2.5 5 30  8/1 1.8 Below goal, continue 5 2.5 5 5 5 2.5 5 30  6/27 2.3 At goal, no change 5 2.5 5 5 5 2.5 5 30  6/1 2.5 At goal, no change 5 2.5 5 5 5 2.5 5 30  5/2 3.1 Above goal, continue 5 2.5 5 5 5 2.5 5 30  4/4 2.2 At goal, no change 5 2.5 5 5 5 2.5 5 30  3/14 1.9 Below goal, continue 5 2.5 5 5 5 2.5 5 30  2/9 2.2 At goal, no change 5 2.5 5 5 5 2.5 5 30  1/11 2.0 At goal, no change 5 2.5 5 5 5 2.5 5 30    Patient History:   Recent hospitalizations/HC visits 2/5 Dr. Elina Ho: no med changes   Recent medication changes None reported   Medications taken regularly that may interact with warfarin or alter INR No significant drug interactions identified   Warfarin dose taken as prescribed Yes   Signs/symptoms of bleeding None reported    Vitamin K intake Normally has ~2-3 servings/week (2/3 can of spinach)   Recent vomiting/diarrhea/fever, changes in weight or activity level None reported   Tobacco or alcohol use Denies use of either   Upcoming surgeries or procedures None reported     Assessment/Plan:   Patient's INR was therapeutic today (3.4). Patient denies any warfarin dosing errors, diet changes, medication changes, illness, or bleeding. He reports he may have eaten less vitamin K foods this week, which is likely contributing to elevated INR today. Patient prefers to adjust vitamin K intake instead of warfarin dose if possible when INR is out of range. Patient was instructed to hold warfarin today only then continue stable warfarin regimen of 2.5 mg on Mon+Fri, and 5 mg on all other days. Repeat INR in 3 weeks, will extend out to 4 weeks if stable next appointment. Patient was reminded to maintain consistent vitamin K intake and call with any bleeding, medication changes, or fever/vomiting/diarrhea. Patient understands dosing directions and information discussed. Dosing schedule and follow up appointment given to patient. Progress note routed to referring physician's office. Patient acknowledges working in consult agreement with pharmacist as referred by his/her physician. Next Clinic Appointment:  5/28    Please call Bin at [phone] with any questions. Thanks!   Michelle Vergara, PharmD, [facility]  [facility]  [place]: [phone]  [place]: [phone]  5/7/2021 10:27 AM    For Pharmacy Admin Tracking Only     Intervention Detail: Dose Adjustment: 1: reason: Therapy Optimization   Total # of Interventions Recommended: 1   Total # of Interventions Accepted: 1   Time Spent (min): 15

## 2021-05-26 ENCOUNTER — ANTI-COAG VISIT (OUTPATIENT)
Dept: PHARMACY | Age: 71
End: 2021-05-26
Payer: MEDICARE

## 2021-05-26 DIAGNOSIS — Z79.01 LONG TERM CURRENT USE OF ANTICOAGULANT THERAPY: Primary | ICD-10-CM

## 2021-05-26 LAB — INTERNATIONAL NORMALIZATION RATIO, POC: 2.2

## 2021-05-26 PROCEDURE — 85610 PROTHROMBIN TIME: CPT

## 2021-05-26 PROCEDURE — 99211 OFF/OP EST MAY X REQ PHY/QHP: CPT

## 2021-05-26 NOTE — PROGRESS NOTES
ANTICOAGULATION SERVICE    Guanaco Mohan is a 69 y.o. male with PMHx significant for A-fib, DVT, internal hemorrhoids, subdural hematoma, lymphoma who presents to clinic 5/26/2021 for anticoagulation monitoring and adjustment.     Anticoagulation Indication(s):  Afib + h/o DVT  Referring Physician:  Dr. Jelani Scott  Goal INR Range:  2-3  Duration of Anticoagulation Therapy:  Indefinite  Time of day dose taken:  AM  Product patient has at home:  warfarin 5 mg    INR Summary                           Warfarin regimen (mg)  Date INR   A/P   Sun Mon Tue Wed Thu Fri Sat Mg/wk  5/26 2.2 At goal, no change 5 2.5 5 5 5 2.5 5 30  5/7 3.4 Above goal, hold x 1 5 2.5 5 5 5 0/2.5 5 30  4/9 2.4 At goal, no change 5 2.5 5 5 5 2.5 5 30  2/24 2.1 At goal, no change 5 2.5 5 5 5 2.5 5 30  1/15 2.3 At goal, no change 5 2.5 5 5 5 2.5 5 30  12/17 2.5 At goal, no change 5 2.5 5 5 5 2.5 5 30  11/30 3.8 Above goal, hold x 1 5 0/2.5 5 5 5 2.5 5 30  11/2 2.5 At goal, no change 5 2.5 5 5 5 2.5 5 30  10/2 2.2 At goal, no change 5 2.5 5 5 5 2.5 5 30  8/20 2.5 At goal, no change 5 2.5 5 5 5 2.5 5 30  7/23 2.9 At goal, no change 5 2.5 5 5 5 2.5 5 30  6/4 2.4 At goal, no change 5 2.5 5 5 5 2.5 5 30  4/23 2.7 At goal, no change 5 2.5 5 5 5 2.5 5 30  2/20 2.9 At goal, no change 5 2.5 5 5 5 2.5 5 30  1/22 1.8 Below goal, continue 5 2.5 5 5 5 2.5 5 30  12/5 2.5 At goal, no change 5 2.5 5 5 5 2.5 5 30  11/7 2.2 At goal, no change 5 2.5 5 5 5 2.5 5 30  10/3 2.2 At goal, no change 5 2.5 5 5 5 2.5 5 30  8/22 2.4 At goal, no change 5 2.5 5 5 5 2.5 5 30  7/18 2.3  At goal, no change 5 2.5 5 5 5 2.5 5 30  6/14 2.3 At goal, no change 5 2.5 5 5 5 2.5 5 30  5/7 3.1 Above goal, continue 5 2.5 5 5 5 2.5 5 30   4/1 1.9 Below goal, continue 5 2.5 5 5 5 2.5 5 30   3/1 2.7 At goal, no change 5 2.5 5 5 5 2.5 5 30  2/4 2.2 At goal, no change 5 2.5 5 5 5 2.5 5 30  12/17 2.2 At goal, no change 5 2.5 5 5 5 2.5 5 30  10/31 2.1 At goal, no change 5 2.5 5 5 5 2.5 5 30  9/27 2.5 At goal, no change 5 2.5 5 5 5 2.5 5 30  8/30 2.0 At goal, no change 5 2.5 5 5 5 2.5 5 30  8/1 1.8 Below goal, continue 5 2.5 5 5 5 2.5 5 30  6/27 2.3 At goal, no change 5 2.5 5 5 5 2.5 5 30  6/1 2.5 At goal, no change 5 2.5 5 5 5 2.5 5 30  5/2 3.1 Above goal, continue 5 2.5 5 5 5 2.5 5 30  4/4 2.2 At goal, no change 5 2.5 5 5 5 2.5 5 30  3/14 1.9 Below goal, continue 5 2.5 5 5 5 2.5 5 30  2/9 2.2 At goal, no change 5 2.5 5 5 5 2.5 5 30  1/11 2.0 At goal, no change 5 2.5 5 5 5 2.5 5 30    Patient History:   Recent hospitalizations/HC visits 2/5 Dr. Rachana Khalil: no med changes   Recent medication changes None reported   Medications taken regularly that may interact with warfarin or alter INR No significant drug interactions identified   Warfarin dose taken as prescribed Yes   Signs/symptoms of bleeding None reported    Vitamin K intake -5/7 switched to 1/4 can of spinach on MWF  -Normally has ~2-3 servings/week (2/3 can of spinach)   Recent vomiting/diarrhea/fever, changes in weight or activity level None reported   Tobacco or alcohol use Denies use of either   Upcoming surgeries or procedures None reported     Assessment/Plan:   Patient's INR was therapeutic today (2.2). Patient denies any warfarin dosing errors, medication changes, illness, or bleeding. He recently decided to cut back on his meal portions, including his spinach (now 1/4 can on MWF). This should not significantly impact INR as he is still eating canned spinach three days per week. Patient prefers to adjust vitamin K intake instead of warfarin dose if possible when INR is out of range. Patient was instructed to continue stable warfarin regimen of 2.5 mg on Mon+Fri, and 5 mg on all other days. Repeat INR in 4 weeks. Patient was reminded to maintain consistent vitamin K intake and call with any bleeding, medication changes, or fever/vomiting/diarrhea. Patient understands dosing directions and information discussed.   Dosing schedule and follow up appointment given to patient. Progress note routed to referring physician's office. Patient acknowledges working in consult agreement with pharmacist as referred by his/her physician. Next Clinic Appointment:      Please call Bin at [phone] with any questions. Thanks! Gretel Gaines.  Claudia AlejandraD, BCPS  [facility]  Ph: [phone]  2021 10:02 AM    For Pharmacy Admin Tracking Only     Intervention Detail: Adherence Monitorin   Total # of Interventions Recommended: 0   Total # of Interventions Accepted: 0   Time Spent (min): 15

## 2021-06-10 ENCOUNTER — OFFICE VISIT (OUTPATIENT)
Dept: CARDIOLOGY CLINIC | Age: 71
End: 2021-06-10
Payer: MEDICARE

## 2021-06-10 VITALS
SYSTOLIC BLOOD PRESSURE: 130 MMHG | BODY MASS INDEX: 28.68 KG/M2 | HEART RATE: 60 BPM | DIASTOLIC BLOOD PRESSURE: 90 MMHG | WEIGHT: 223.4 LBS

## 2021-06-10 DIAGNOSIS — R00.2 PALPITATION: ICD-10-CM

## 2021-06-10 DIAGNOSIS — I48.20 CHRONIC ATRIAL FIBRILLATION (HCC): Primary | ICD-10-CM

## 2021-06-10 DIAGNOSIS — I34.0 NONRHEUMATIC MITRAL VALVE REGURGITATION: ICD-10-CM

## 2021-06-10 PROCEDURE — G8427 DOCREV CUR MEDS BY ELIG CLIN: HCPCS | Performed by: INTERNAL MEDICINE

## 2021-06-10 PROCEDURE — 4040F PNEUMOC VAC/ADMIN/RCVD: CPT | Performed by: INTERNAL MEDICINE

## 2021-06-10 PROCEDURE — 3017F COLORECTAL CA SCREEN DOC REV: CPT | Performed by: INTERNAL MEDICINE

## 2021-06-10 PROCEDURE — G8417 CALC BMI ABV UP PARAM F/U: HCPCS | Performed by: INTERNAL MEDICINE

## 2021-06-10 PROCEDURE — 99213 OFFICE O/P EST LOW 20 MIN: CPT | Performed by: INTERNAL MEDICINE

## 2021-06-10 PROCEDURE — 1036F TOBACCO NON-USER: CPT | Performed by: INTERNAL MEDICINE

## 2021-06-10 PROCEDURE — 1123F ACP DISCUSS/DSCN MKR DOCD: CPT | Performed by: INTERNAL MEDICINE

## 2021-06-10 NOTE — PROGRESS NOTES
Right eye exhibits no discharge. Left eye exhibits no discharge. Neck: Normal range of motion. Neck supple. No JVD present. Cardiovascular:  irreg/irregRate rhythm, S1 normal, S2 normal and normal heart sounds. Exam reveals no gallop. Pulses:       Radial pulses are 2+ on the right side, and 2+ on the left side. Pulmonary/Chest: Effort normal and breath sounds normal. No respiratory distress. He has no wheezes. He has no rales. Abdominal: Soft. Bowel sounds are normal. No tenderness. Musculoskeletal: Normal range of motion. He exhibits no edema. Tr R  Neurological: He is alert and oriented to person, place, and time. Skin: Skin is warm and dry. Psychiatric: He has a normal mood and affect. His behavior is normal. Thought content normal.       Assessment:       Diagnosis Orders   1. Chronic atrial fibrillation (HCC)     2. Palpitation     3. Nonrheumatic mitral valve regurgitation             Plan:      CV stable. Active. Rhythm stable. HR normal.  Will continue sotalol to 40 mg bid. No real changes with lower dose. Continues exercising. On AC. No bleeding issues. Remains compensated. No changes. Reviewed previous records and testing including echo 7/20. Continue to monitor. Follow up 4 months.

## 2021-06-14 ENCOUNTER — TELEPHONE (OUTPATIENT)
Dept: PHARMACY | Age: 71
End: 2021-06-14

## 2021-06-14 NOTE — TELEPHONE ENCOUNTER
LVM to r/s INR check on 6/25/21, as the clinic will be closed. Asked pt to return call.      Santana Dyer, PharmD, [facility]  Medication Management Clinic   Richie Leon3 Ph: [phone]  Mitzi Matamoros Ph: [phone]  6/14/2021 5:18 PM

## 2021-06-15 NOTE — TELEPHONE ENCOUNTER
LVM#2 to r/s    [employer], PharmD, [facility]  Medication Management Clinic   [facility] Ph: [phone]  Diamante Alarcon Ph: [phone]  6/15/2021 4:37 PM

## 2021-06-28 ENCOUNTER — ANTI-COAG VISIT (OUTPATIENT)
Dept: PHARMACY | Age: 71
End: 2021-06-28
Payer: MEDICARE

## 2021-06-28 DIAGNOSIS — Z79.01 LONG TERM CURRENT USE OF ANTICOAGULANT THERAPY: Primary | ICD-10-CM

## 2021-06-28 DIAGNOSIS — I48.91 ATRIAL FIBRILLATION, UNSPECIFIED TYPE (HCC): ICD-10-CM

## 2021-06-28 LAB — INTERNATIONAL NORMALIZATION RATIO, POC: 2.8

## 2021-06-28 PROCEDURE — 85610 PROTHROMBIN TIME: CPT

## 2021-06-28 PROCEDURE — 99211 OFF/OP EST MAY X REQ PHY/QHP: CPT

## 2021-06-28 NOTE — PROGRESS NOTES
ANTICOAGULATION SERVICE    Delmy Madera is a 69 y.o. male with PMHx significant for A-fib, DVT, internal hemorrhoids, subdural hematoma, lymphoma who presents to clinic 6/28/2021 for anticoagulation monitoring and adjustment.     Anticoagulation Indication(s):  Afib + h/o DVT  Referring Physician:  Dr. Jesus Turner  Goal INR Range:  2-3  Duration of Anticoagulation Therapy:  Indefinite  Time of day dose taken:  AM  Product patient has at home:  warfarin 5 mg    INR Summary                           Warfarin regimen (mg)  Date INR   A/P   Sun Mon Tue Wed Thu Fri Sat Mg/wk  6/28 2.8 At goal, no change 5 2.5 5 5 5 2.5 5 30  5/26 2.2 At goal, no change 5 2.5 5 5 5 2.5 5 30  5/7 3.4 Above goal, hold x 1 5 2.5 5 5 5 0/2.5 5 30  4/9 2.4 At goal, no change 5 2.5 5 5 5 2.5 5 30  2/24 2.1 At goal, no change 5 2.5 5 5 5 2.5 5 30  1/15 2.3 At goal, no change 5 2.5 5 5 5 2.5 5 30  12/17 2.5 At goal, no change 5 2.5 5 5 5 2.5 5 30  11/30 3.8 Above goal, hold x 1 5 0/2.5 5 5 5 2.5 5 30  11/2 2.5 At goal, no change 5 2.5 5 5 5 2.5 5 30  10/2 2.2 At goal, no change 5 2.5 5 5 5 2.5 5 30  8/20 2.5 At goal, no change 5 2.5 5 5 5 2.5 5 30  7/23 2.9 At goal, no change 5 2.5 5 5 5 2.5 5 30  6/4 2.4 At goal, no change 5 2.5 5 5 5 2.5 5 30  4/23 2.7 At goal, no change 5 2.5 5 5 5 2.5 5 30  2/20 2.9 At goal, no change 5 2.5 5 5 5 2.5 5 30  1/22 1.8 Below goal, continue 5 2.5 5 5 5 2.5 5 30  12/5 2.5 At goal, no change 5 2.5 5 5 5 2.5 5 30  11/7 2.2 At goal, no change 5 2.5 5 5 5 2.5 5 30  10/3 2.2 At goal, no change 5 2.5 5 5 5 2.5 5 30  8/22 2.4 At goal, no change 5 2.5 5 5 5 2.5 5 30  7/18 2.3  At goal, no change 5 2.5 5 5 5 2.5 5 30  6/14 2.3 At goal, no change 5 2.5 5 5 5 2.5 5 30  5/7 3.1 Above goal, continue 5 2.5 5 5 5 2.5 5 30   4/1 1.9 Below goal, continue 5 2.5 5 5 5 2.5 5 30   3/1 2.7 At goal, no change 5 2.5 5 5 5 2.5 5 30  2/4 2.2 At goal, no change 5 2.5 5 5 5 2.5 5 30  12/17 2.2 At goal, no change 5 2.5 5 5 5 2.5 5 30  10/31 2.1 At goal, no change 5 2.5 5 5 5 2.5 5 30  9/27 2.5 At goal, no change 5 2.5 5 5 5 2.5 5 30  8/30 2.0 At goal, no change 5 2.5 5 5 5 2.5 5 30  8/1 1.8 Below goal, continue 5 2.5 5 5 5 2.5 5 30  6/27 2.3 At goal, no change 5 2.5 5 5 5 2.5 5 30  6/1 2.5 At goal, no change 5 2.5 5 5 5 2.5 5 30  5/2 3.1 Above goal, continue 5 2.5 5 5 5 2.5 5 30  4/4 2.2 At goal, no change 5 2.5 5 5 5 2.5 5 30  3/14 1.9 Below goal, continue 5 2.5 5 5 5 2.5 5 30  2/9 2.2 At goal, no change 5 2.5 5 5 5 2.5 5 30  1/11 2.0 At goal, no change 5 2.5 5 5 5 2.5 5 30    Patient History:   Recent hospitalizations/HC visits 2/5 Dr. Ny Allen: no med changes   Recent medication changes None reported   Medications taken regularly that may interact with warfarin or alter INR No significant drug interactions identified   Warfarin dose taken as prescribed Yes   Signs/symptoms of bleeding None reported    Vitamin K intake -Normally has ~2-3 servings/week (2/3 can of spinach)  -5/7/21 switched to 1/4 can of spinach on MWF  -6/28/21: decreased to 1/4 can spinach once per week since last visit   Recent vomiting/diarrhea/fever, changes in weight or activity level NVD for 48 hours ~2 wks ago   Tobacco or alcohol use Denies use of either   Upcoming surgeries or procedures None reported     Assessment/Plan:   Patient's INR was therapeutic today (2.8), despite a slight decrease in vit k intake. He is now only eating 1/4 can spinach once per week. He will attempt to increase to ~2x per week, but is not sure if he will return to previous intake. This does not seem to have impacted INR significantly. He also had a GI bug (NVD) ~2 weeks ago, but this only lasted ~48h. Patient denies any warfarin dosing errors, medication changes, illness, or bleeding. Patient prefers to adjust vitamin K intake instead of warfarin dose if possible when INR is out of range. Patient was instructed to continue stable warfarin regimen of 2.5 mg on Mon+Fri, and 5 mg on all other days. Repeat INR in 4 weeks. Patient was reminded to maintain consistent vitamin K intake and call with any bleeding, medication changes, or fever/vomiting/diarrhea. Patient understands dosing directions and information discussed. Dosing schedule and follow up appointment given to patient. Progress note routed to referring physician's office. Patient acknowledges working in consult agreement with pharmacist as referred by his/her physician. Next Clinic Appointment:  7/26    Please call [facility] at [phone] with any questions. Thanks!   Russ Tavares, PharmD, [facility]  Medication Management Clinic   [address] Ph: [phone]  Rayray Watson Ph: [phone]  6/28/2021 11:04 AM    For Pharmacy Admin Tracking Only     Total # of Interventions Recommended: 0   Total # of Interventions Accepted: 0   Time Spent (min): 15

## 2021-07-26 ENCOUNTER — ANTI-COAG VISIT (OUTPATIENT)
Dept: PHARMACY | Age: 71
End: 2021-07-26
Payer: MEDICARE

## 2021-07-26 DIAGNOSIS — I48.91 ATRIAL FIBRILLATION, UNSPECIFIED TYPE (HCC): ICD-10-CM

## 2021-07-26 DIAGNOSIS — Z79.01 LONG TERM CURRENT USE OF ANTICOAGULANT THERAPY: Primary | ICD-10-CM

## 2021-07-26 LAB — INTERNATIONAL NORMALIZATION RATIO, POC: 2.9

## 2021-07-26 PROCEDURE — 85610 PROTHROMBIN TIME: CPT

## 2021-07-26 PROCEDURE — 99211 OFF/OP EST MAY X REQ PHY/QHP: CPT

## 2021-07-26 NOTE — PROGRESS NOTES
goal, no change 5 2.5 5 5 5 2.5 5 30  10/31 2.1 At goal, no change 5 2.5 5 5 5 2.5 5 30  9/27 2.5 At goal, no change 5 2.5 5 5 5 2.5 5 30  8/30 2.0 At goal, no change 5 2.5 5 5 5 2.5 5 30  8/1 1.8 Below goal, continue 5 2.5 5 5 5 2.5 5 30  6/27 2.3 At goal, no change 5 2.5 5 5 5 2.5 5 30  6/1 2.5 At goal, no change 5 2.5 5 5 5 2.5 5 30  5/2 3.1 Above goal, continue 5 2.5 5 5 5 2.5 5 30  4/4 2.2 At goal, no change 5 2.5 5 5 5 2.5 5 30  3/14 1.9 Below goal, continue 5 2.5 5 5 5 2.5 5 30  2/9 2.2 At goal, no change 5 2.5 5 5 5 2.5 5 30  1/11 2.0 At goal, no change 5 2.5 5 5 5 2.5 5 30    Patient History:   Recent hospitalizations/HC visits 2/5 Dr. Emma Peng: no med changes   Recent medication changes None reported   Medications taken regularly that may interact with warfarin or alter INR No significant drug interactions identified   Warfarin dose taken as prescribed Yes   Signs/symptoms of bleeding None reported    Vitamin K intake -Normally has 2/3 can of spinach per week, 1-2 shantanu salads per week and 2-3 broccoli per week. -5/7/21 switched to 1/4 can of spinach on MWF  -6/28/21: decreased to 1/4 can spinach once per week since last visit  -7/26/21: 2/3 can spinach per week, but this is starting to bother his stomach and he may try to decrease a little. Recent vomiting/diarrhea/fever, changes in weight or activity level None reported   Tobacco or alcohol use Denies use of either   Upcoming surgeries or procedures None reported     Assessment/Plan:   Patient's INR was therapeutic today (2.9). He would like to cut back on spinach due to GI intolerance, but since his INR has been so stable, he is going to divide the 2/3 c serving into multiple smaller servings during the week. Patient denies any warfarin dosing errors, medication changes, illness, or bleeding. Patient prefers to adjust vitamin K intake instead of warfarin dose if possible when INR is out of range.   Patient was instructed to continue stable warfarin regimen of 2.5 mg on Mon+Fri, and 5 mg on all other days. Repeat INR in 4 weeks. Patient was reminded to maintain consistent vitamin K intake and call with any bleeding, medication changes, or fever/vomiting/diarrhea. Patient understands dosing directions and information discussed. Dosing schedule and follow up appointment given to patient. Progress note routed to referring physician's office. Patient acknowledges working in consult agreement with pharmacist as referred by his/her physician. Next Clinic Appointment:  8/23    Please call Bin at [phone] with any questions. Thanks!   Martir Grossman, PharmD, [facility]  Medication Management Clinic   [address] Ph: [phone]  Octavia Lynch Ph: [phone]  7/26/2021 10:45 AM    For Pharmacy Admin Tracking Only     Total # of Interventions Recommended: 0   Total # of Interventions Accepted: 0   Time Spent (min): 15

## 2021-08-02 ENCOUNTER — TELEPHONE (OUTPATIENT)
Dept: PULMONOLOGY | Age: 71
End: 2021-08-02

## 2021-08-02 NOTE — TELEPHONE ENCOUNTER
Spoke with pt who stated that he will continue to use his machine until something else is found. He stated that he can not get thru to register his machine and he has called numerous times. It was suggested to keep calling until he has contacted someone, he had more questions about the equipment and it was suggested if he needed more questions answered that it would be best  to call his DME, to get  more answers to his questions.  He understood and stated \" thank yo for your help\"

## 2021-08-02 NOTE — TELEPHONE ENCOUNTER
Due to the severity of your sleep apnea we recommend you continue use until an alternative is found. Risks of untreated sleep apnea including but not limited to car accidents, heart attacks, strokes, and death. Alternative therapy may not exist or may be severely limited and the benefits of continued usage of these devices may outweigh the risks identified in the recall notification. Avoid use of unproven cleaning methods, such as ozone. Due to the unknown variables each patient will have to make a determination in his/her own. You can register your machine for the recall at [email or web address] [email or web address] or by calling [phone]. Please contact your equipment company for further instruction until an alternative is found.

## 2021-08-02 NOTE — TELEPHONE ENCOUNTER
Patient called in regard to the recall. Patient was instructed to continue using machine, to not use after-market cleaning devices, and to not store it in heat, cold, or direct sunlight. Patient was told to stop using machine and call the office if they experience headaches, cough, or notice particles in the tubing or any unusual odors. Patient asked if the in-line filters would cause his pressure to go down and if he would need it increased. Please call patient with further instructions at [phone].

## 2021-08-23 ENCOUNTER — ANTI-COAG VISIT (OUTPATIENT)
Dept: PHARMACY | Age: 71
End: 2021-08-23
Payer: MEDICARE

## 2021-08-23 DIAGNOSIS — Z79.01 LONG TERM CURRENT USE OF ANTICOAGULANT THERAPY: Primary | ICD-10-CM

## 2021-08-23 LAB — INTERNATIONAL NORMALIZATION RATIO, POC: 2.3

## 2021-08-23 PROCEDURE — 85610 PROTHROMBIN TIME: CPT

## 2021-08-23 PROCEDURE — 99211 OFF/OP EST MAY X REQ PHY/QHP: CPT

## 2021-08-23 NOTE — PROGRESS NOTES
ANTICOAGULATION SERVICE    Eden Akers is a 66 y.o. male with PMHx significant for A-fib, DVT, internal hemorrhoids, subdural hematoma, lymphoma who presents to clinic 8/23/2021 for anticoagulation monitoring and adjustment.     Anticoagulation Indication(s):  Afib + h/o DVT  Referring Physician:  Dr. Rick Melton  Goal INR Range:  2-3  Duration of Anticoagulation Therapy:  Indefinite  Time of day dose taken:  AM  Product patient has at home:  warfarin 5 mg    INR Summary                           Warfarin regimen (mg)  Date INR   A/P   Sun Mon Tue Wed Thu Fri Sat Mg/wk  8/23 2.3 At goal, no change 5 2.5 5 5 5 2.5 5 30  7/26 2.9 At goal, no change 5 2.5 5 5 5 2.5 5 30  6/28 2.8 At goal, no change 5 2.5 5 5 5 2.5 5 30  5/26 2.2 At goal, no change 5 2.5 5 5 5 2.5 5 30  5/7 3.4 Above goal, hold x 1 5 2.5 5 5 5 0/2.5 5 30  4/9 2.4 At goal, no change 5 2.5 5 5 5 2.5 5 30  2/24 2.1 At goal, no change 5 2.5 5 5 5 2.5 5 30  1/15 2.3 At goal, no change 5 2.5 5 5 5 2.5 5 30  12/17 2.5 At goal, no change 5 2.5 5 5 5 2.5 5 30  11/30 3.8 Above goal, hold x 1 5 0/2.5 5 5 5 2.5 5 30  11/2 2.5 At goal, no change 5 2.5 5 5 5 2.5 5 30  10/2 2.2 At goal, no change 5 2.5 5 5 5 2.5 5 30  8/20 2.5 At goal, no change 5 2.5 5 5 5 2.5 5 30  7/23 2.9 At goal, no change 5 2.5 5 5 5 2.5 5 30  6/4 2.4 At goal, no change 5 2.5 5 5 5 2.5 5 30  4/23 2.7 At goal, no change 5 2.5 5 5 5 2.5 5 30  2/20 2.9 At goal, no change 5 2.5 5 5 5 2.5 5 30  1/22 1.8 Below goal, continue 5 2.5 5 5 5 2.5 5 30  12/5 2.5 At goal, no change 5 2.5 5 5 5 2.5 5 30  11/7 2.2 At goal, no change 5 2.5 5 5 5 2.5 5 30  10/3 2.2 At goal, no change 5 2.5 5 5 5 2.5 5 30  8/22 2.4 At goal, no change 5 2.5 5 5 5 2.5 5 30  7/18 2.3  At goal, no change 5 2.5 5 5 5 2.5 5 30  6/14 2.3 At goal, no change 5 2.5 5 5 5 2.5 5 30  5/7 3.1 Above goal, continue 5 2.5 5 5 5 2.5 5 30   4/1 1.9 Below goal, continue 5 2.5 5 5 5 2.5 5 30   3/1 2.7 At goal, no change 5 2.5 5 5 5 2.5 5 30  2/4 2.2 At vitamin K intake and call with any bleeding, medication changes, or fever/vomiting/diarrhea. Patient understands dosing directions and information discussed. Dosing schedule and follow up appointment given to patient. Progress note routed to referring physician's office. Patient acknowledges working in consult agreement with pharmacist as referred by his/her physician. Next Clinic Appointment:  9/20    Please call Bin at [phone] with any questions. Thanks!   Lauren Vanegas PharmD  Medication Management Clinic   [address] Ph: [phone]  Maximo Mobley Ph: [phone]  8/23/2021 10:52 AM    For Pharmacy Admin Tracking Only     Total # of Interventions Recommended: 0   Total # of Interventions Accepted: 0   Time Spent (min): 15

## 2021-09-20 ENCOUNTER — ANTI-COAG VISIT (OUTPATIENT)
Dept: PHARMACY | Age: 71
End: 2021-09-20
Payer: MEDICARE

## 2021-09-20 DIAGNOSIS — Z79.01 LONG TERM CURRENT USE OF ANTICOAGULANT THERAPY: Primary | ICD-10-CM

## 2021-09-20 DIAGNOSIS — I48.91 ATRIAL FIBRILLATION, UNSPECIFIED TYPE (HCC): ICD-10-CM

## 2021-09-20 LAB — INTERNATIONAL NORMALIZATION RATIO, POC: 2.1

## 2021-09-20 PROCEDURE — 85610 PROTHROMBIN TIME: CPT | Performed by: PHARMACIST

## 2021-09-20 PROCEDURE — 99211 OFF/OP EST MAY X REQ PHY/QHP: CPT | Performed by: PHARMACIST

## 2021-09-20 NOTE — PROGRESS NOTES
ANTICOAGULATION SERVICE    Morteza Banuelos is a 66 y.o. male with PMHx significant for A-fib, DVT, internal hemorrhoids, subdural hematoma, lymphoma who presents to clinic 9/20/2021 for anticoagulation monitoring and adjustment.     Anticoagulation Indication(s):  Afib + h/o DVT  Referring Physician:  Dr. Chucky Astorga  Goal INR Range:  2-3  Duration of Anticoagulation Therapy:  Indefinite  Time of day dose taken:  AM  Product patient has at home:  warfarin 5 mg    INR Summary                           Warfarin regimen (mg)  Date INR   A/P   Sun Mon Tue Wed Thu Fri Sat Mg/wk  9/20 2.1 At goal, no change 5 2.5 5 5 5 2.5 5 30  8/23 2.3 At goal, no change 5 2.5 5 5 5 2.5 5 30  7/26 2.9 At goal, no change 5 2.5 5 5 5 2.5 5 30  6/28 2.8 At goal, no change 5 2.5 5 5 5 2.5 5 30  5/26 2.2 At goal, no change 5 2.5 5 5 5 2.5 5 30  5/7 3.4 Above goal, hold x 1 5 2.5 5 5 5 0/2.5 5 30  4/9 2.4 At goal, no change 5 2.5 5 5 5 2.5 5 30  2/24 2.1 At goal, no change 5 2.5 5 5 5 2.5 5 30  1/15 2.3 At goal, no change 5 2.5 5 5 5 2.5 5 30  12/17 2.5 At goal, no change 5 2.5 5 5 5 2.5 5 30  11/30 3.8 Above goal, hold x 1 5 0/2.5 5 5 5 2.5 5 30  11/2 2.5 At goal, no change 5 2.5 5 5 5 2.5 5 30  10/2 2.2 At goal, no change 5 2.5 5 5 5 2.5 5 30  8/20 2.5 At goal, no change 5 2.5 5 5 5 2.5 5 30  7/23 2.9 At goal, no change 5 2.5 5 5 5 2.5 5 30  6/4 2.4 At goal, no change 5 2.5 5 5 5 2.5 5 30  4/23 2.7 At goal, no change 5 2.5 5 5 5 2.5 5 30  2/20 2.9 At goal, no change 5 2.5 5 5 5 2.5 5 30  1/22 1.8 Below goal, continue 5 2.5 5 5 5 2.5 5 30  12/5 2.5 At goal, no change 5 2.5 5 5 5 2.5 5 30  11/7 2.2 At goal, no change 5 2.5 5 5 5 2.5 5 30  10/3 2.2 At goal, no change 5 2.5 5 5 5 2.5 5 30  8/22 2.4 At goal, no change 5 2.5 5 5 5 2.5 5 30  7/18 2.3  At goal, no change 5 2.5 5 5 5 2.5 5 30  6/14 2.3 At goal, no change 5 2.5 5 5 5 2.5 5 30  5/7 3.1 Above goal, continue 5 2.5 5 5 5 2.5 5 30   4/1 1.9 Below goal, continue 5 2.5 5 5 5 2.5 5 30   3/1 2.7 At goal, no change 5 2.5 5 5 5 2.5 5 30  2/4 2.2 At goal, no change 5 2.5 5 5 5 2.5 5 30  12/17 2.2 At goal, no change 5 2.5 5 5 5 2.5 5 30  10/31 2.1 At goal, no change 5 2.5 5 5 5 2.5 5 30  9/27 2.5 At goal, no change 5 2.5 5 5 5 2.5 5 30  8/30 2.0 At goal, no change 5 2.5 5 5 5 2.5 5 30  8/1 1.8 Below goal, continue 5 2.5 5 5 5 2.5 5 30  6/27 2.3 At goal, no change 5 2.5 5 5 5 2.5 5 30  6/1 2.5 At goal, no change 5 2.5 5 5 5 2.5 5 30  5/2 3.1 Above goal, continue 5 2.5 5 5 5 2.5 5 30  4/4 2.2 At goal, no change 5 2.5 5 5 5 2.5 5 30  3/14 1.9 Below goal, continue 5 2.5 5 5 5 2.5 5 30  2/9 2.2 At goal, no change 5 2.5 5 5 5 2.5 5 30  1/11 2.0 At goal, no change 5 2.5 5 5 5 2.5 5 30    Patient History:   Recent hospitalizations/HC visits 2/5 Dr. Berta Irizarry: no med changes   Recent medication changes None reported   Medications taken regularly that may interact with warfarin or alter INR No significant drug interactions identified   Warfarin dose taken as prescribed Yes   Signs/symptoms of bleeding None reported    Vitamin K intake -Normally has 2/3 can of spinach per week, 1-2 shantanu salads per week and 2-3 broccoli per week. -5/7/21 switched to 1/4 can of spinach on MWF  -6/28/21: decreased to 1/4 can spinach once per week since last visit  -7/26/21: 2/3 can spinach per week, but this is starting to bother his stomach and he may try to decrease a little. Recent vomiting/diarrhea/fever, changes in weight or activity level None reported   Tobacco or alcohol use Denies use of either   Upcoming surgeries or procedures None reported     Assessment/Plan:   Patient's INR was therapeutic today (2.1). Patient denies any warfarin dosing errors, medication changes, illness, or bleeding. Patient prefers to adjust vitamin K intake instead of warfarin dose if possible when INR is out of range. Patient was instructed to continue stable warfarin regimen of 2.5 mg on Mon+Fri, and 5 mg on all other days. Repeat INR in 4 weeks. Patient was reminded to maintain consistent vitamin K intake and call with any bleeding, medication changes, or fever/vomiting/diarrhea. Patient understands dosing directions and information discussed. Dosing schedule and follow up appointment given to patient. Progress note routed to referring physician's office. Patient acknowledges working in consult agreement with pharmacist as referred by his/her physician. Next Clinic Appointment:  10/18    Please call Bin at [phone] with any questions. Thanks!   Arianna Rodriguez, PharmD, BCPS  [facility] Medication [facility]  [place]: [phone]  [facility]: [phone]  9/20/2021 11:29 AM    For Pharmacy Admin Tracking Only     Total # of Interventions Recommended: 0   Total # of Interventions Accepted: 0   Time Spent (min): 15

## 2021-10-14 ENCOUNTER — ANTI-COAG VISIT (OUTPATIENT)
Dept: PHARMACY | Age: 71
End: 2021-10-14
Payer: MEDICARE

## 2021-10-14 ENCOUNTER — OFFICE VISIT (OUTPATIENT)
Dept: CARDIOLOGY CLINIC | Age: 71
End: 2021-10-14
Payer: MEDICARE

## 2021-10-14 VITALS
SYSTOLIC BLOOD PRESSURE: 120 MMHG | DIASTOLIC BLOOD PRESSURE: 80 MMHG | BODY MASS INDEX: 28.63 KG/M2 | HEART RATE: 68 BPM | WEIGHT: 223 LBS

## 2021-10-14 DIAGNOSIS — R00.2 PALPITATION: ICD-10-CM

## 2021-10-14 DIAGNOSIS — I48.20 CHRONIC ATRIAL FIBRILLATION (HCC): Primary | ICD-10-CM

## 2021-10-14 DIAGNOSIS — I48.91 ATRIAL FIBRILLATION, UNSPECIFIED TYPE (HCC): ICD-10-CM

## 2021-10-14 DIAGNOSIS — I34.0 NONRHEUMATIC MITRAL VALVE REGURGITATION: ICD-10-CM

## 2021-10-14 DIAGNOSIS — Z79.01 LONG TERM CURRENT USE OF ANTICOAGULANT THERAPY: Primary | ICD-10-CM

## 2021-10-14 LAB — INTERNATIONAL NORMALIZATION RATIO, POC: 2.1

## 2021-10-14 PROCEDURE — 99214 OFFICE O/P EST MOD 30 MIN: CPT | Performed by: INTERNAL MEDICINE

## 2021-10-14 PROCEDURE — G8417 CALC BMI ABV UP PARAM F/U: HCPCS | Performed by: INTERNAL MEDICINE

## 2021-10-14 PROCEDURE — 4040F PNEUMOC VAC/ADMIN/RCVD: CPT | Performed by: INTERNAL MEDICINE

## 2021-10-14 PROCEDURE — 1123F ACP DISCUSS/DSCN MKR DOCD: CPT | Performed by: INTERNAL MEDICINE

## 2021-10-14 PROCEDURE — 99211 OFF/OP EST MAY X REQ PHY/QHP: CPT | Performed by: PHARMACIST

## 2021-10-14 PROCEDURE — G8427 DOCREV CUR MEDS BY ELIG CLIN: HCPCS | Performed by: INTERNAL MEDICINE

## 2021-10-14 PROCEDURE — G8484 FLU IMMUNIZE NO ADMIN: HCPCS | Performed by: INTERNAL MEDICINE

## 2021-10-14 PROCEDURE — 3017F COLORECTAL CA SCREEN DOC REV: CPT | Performed by: INTERNAL MEDICINE

## 2021-10-14 PROCEDURE — 1036F TOBACCO NON-USER: CPT | Performed by: INTERNAL MEDICINE

## 2021-10-14 PROCEDURE — 85610 PROTHROMBIN TIME: CPT | Performed by: PHARMACIST

## 2021-10-14 NOTE — PROGRESS NOTES
Subjective:      Patient ID: Boom Hong is a 66 y.o. male. HPI Rocio Paez is here today for follow up afib/palp/mr. No complaints except had arrhythmia x 2 since vaccine. Otherwise rhythm stable. Continues exercising as best possible at gym. Rhythm good. No sob. No edema. No pnd or orthopnea. No syncope. No syncope. Wt down. Past Medical History:   Diagnosis Date    AF (atrial fibrillation) (HCC)     Aortic valve disease     leaking no problems    Lymphoma [facility])      Past Surgical History:   Procedure Laterality Date    APPENDECTOMY      COLONOSCOPY  8/18/2006     Dr. Haleigh Kidd - internal hemorrhoids , hyperplastic polyp     CYSTOSCOPY  1-11-10     Dr. Colón All ureteral stent     LYMPH NODE BIOPSY  5/11/11    RIGHT CERVICAL    LYMPH NODE DISSECTION N/A 2011    TONSILLECTOMY           Allergies   Allergen Reactions    Gentamicin Other (See Comments)     Dad had reaction        Social History     Socioeconomic History    Marital status:      Spouse name: Not on file    Number of children: 0    Years of education: Not on file    Highest education level: Not on file   Occupational History    Occupation: retired   Tobacco Use    Smoking status: Never Smoker    Smokeless tobacco: Never Used   Vaping Use    Vaping Use: Never used   Substance and Sexual Activity    Alcohol use: No     Alcohol/week: 0.0 standard drinks    Drug use: No    Sexual activity: Yes     Partners: Female   Other Topics Concern    Not on file   Social History Narrative    Not on file     Social Determinants of Health     Financial Resource Strain:     Difficulty of Paying Living Expenses:    Food Insecurity:     Worried About [address] in the Last Year:     [address] in the Last Year:    Transportation Needs:     Lack of Transportation (Medical):      Lack of Transportation (Non-Medical):    Physical Activity:     Days of Exercise per Week:     Minutes of Exercise per Session: Stress:     Feeling of Stress :    Social Connections:     Frequency of Communication with Friends and Family:     Frequency of Social Gatherings with Friends and Family:     Attends Gnosticist Services:     Active Member of Clubs or Organizations:     Attends Club or Organization Meetings:     Marital Status:    Intimate Partner Violence:     Fear of Current or Ex-Partner:     Emotionally Abused:     Physically Abused:     Sexually Abused:         Patient has a family history includes Heart Disease in his mother. Patient  has a past medical history of AF (atrial fibrillation) ([facility] [address] Aortic valve disease, and Lymphoma ([facility] [address] Current Outpatient Medications   Medication Sig Dispense Refill    warfarin (COUMADIN) 5 MG tablet TAKE ONE-HALF TABLET BY MOUTH DAILY ON MONDAYS AND FRIDAYS AND TAKE ONE TABLET BY MOUTH DAILY ON ALL OTHER DAYS 90 tablet 3    sotalol (BETAPACE) 80 MG tablet Take one tablet twice a day (Patient taking differently: Take 40 mg by mouth 2 times daily Take one tablet twice a day) 180 tablet 3     No current facility-administered medications for this visit. Vitals:    10/14/21 1032   BP: 120/80   Pulse: 68     Wt 223    Review of Systems   Constitutional: Negative for activity change and fatigue. Respiratory: Negative for apnea, cough, choking, chest tightness and shortness of breath. Cardiovascular: Negative for chest pain, palpitations and leg swelling. No PND or orthopnea. No tachycardia. Gastrointestinal: Negative for abdominal distention. Musculoskeletal: Negative for myalgias. Neurological: Negative for dizziness, syncope and light-headedness. Psychiatric/Behavioral: Negative for behavioral problems, confusion and agitation. All other systems reviewed negative as done. Objective:   Physical Exam   Constitutional: He is oriented to person, place, and time. He appears well-developed and well-nourished. No distress.    HENT:   Head: Normocephalic and atraumatic. Eyes: Conjunctivae and EOM are normal. Right eye exhibits no discharge. Left eye exhibits no discharge. Neck: Normal range of motion. Neck supple. No JVD present. Cardiovascular:  irreg/irregRate rhythm, S1 normal, S2 normal and normal heart sounds. Exam reveals no gallop. Pulses:       Radial pulses are 2+ on the right side, and 2+ on the left side. Pulmonary/Chest: Effort normal and breath sounds normal. No respiratory distress. He has no wheezes. He has no rales. Abdominal: Soft. Bowel sounds are normal. No tenderness. Musculoskeletal: Normal range of motion. He exhibits no edema. Tr R  Neurological: He is alert and oriented to person, place, and time. Skin: Skin is warm and dry. Psychiatric: He has a normal mood and affect. His behavior is normal. Thought content normal.       Assessment:      Vitals:    10/14/21 1032   BP: 120/80   Pulse: 68           Plan:      CV stable. Active. Rhythm stable outside of vaccine. HR normal.  Will continue sotalol to 40 mg bid. No real changes with lower dose. Continues exercising. On AC. No bleeding issues. Remains compensated. No changes. Reviewed previous records and testing including echo 7/20. Continue to monitor. Follow up 4 months.

## 2021-10-14 NOTE — PROGRESS NOTES
ANTICOAGULATION SERVICE    Mauro Boyd is a 66 y.o. male with PMHx significant for A-fib, DVT, internal hemorrhoids, subdural hematoma, lymphoma who presents to clinic 10/14/2021 for anticoagulation monitoring and adjustment.     Anticoagulation Indication(s):  Afib + h/o DVT  Referring Physician:  Dr. Machado Areas  Goal INR Range:  2-3  Duration of Anticoagulation Therapy:  Indefinite  Time of day dose taken:  AM  Product patient has at home:  warfarin 5 mg    INR Summary                           Warfarin regimen (mg)  Date INR   A/P   Sun Mon Tue Wed Thu Fri Sat Mg/wk  10/14 2.1 At goal, no change 5 2.5 5 5 5 2.5 5 30  9/20 2.1 At goal, no change 5 2.5 5 5 5 2.5 5 30  8/23 2.3 At goal, no change 5 2.5 5 5 5 2.5 5 30  7/26 2.9 At goal, no change 5 2.5 5 5 5 2.5 5 30  6/28 2.8 At goal, no change 5 2.5 5 5 5 2.5 5 30  5/26 2.2 At goal, no change 5 2.5 5 5 5 2.5 5 30  5/7 3.4 Above goal, hold x 1 5 2.5 5 5 5 0/2.5 5 30  4/9 2.4 At goal, no change 5 2.5 5 5 5 2.5 5 30  2/24 2.1 At goal, no change 5 2.5 5 5 5 2.5 5 30  1/15 2.3 At goal, no change 5 2.5 5 5 5 2.5 5 30  12/17 2.5 At goal, no change 5 2.5 5 5 5 2.5 5 30  11/30 3.8 Above goal, hold x 1 5 0/2.5 5 5 5 2.5 5 30  11/2 2.5 At goal, no change 5 2.5 5 5 5 2.5 5 30  10/2 2.2 At goal, no change 5 2.5 5 5 5 2.5 5 30  8/20 2.5 At goal, no change 5 2.5 5 5 5 2.5 5 30  7/23 2.9 At goal, no change 5 2.5 5 5 5 2.5 5 30  6/4 2.4 At goal, no change 5 2.5 5 5 5 2.5 5 30  4/23 2.7 At goal, no change 5 2.5 5 5 5 2.5 5 30  2/20 2.9 At goal, no change 5 2.5 5 5 5 2.5 5 30  1/22 1.8 Below goal, continue 5 2.5 5 5 5 2.5 5 30  12/5 2.5 At goal, no change 5 2.5 5 5 5 2.5 5 30  11/7 2.2 At goal, no change 5 2.5 5 5 5 2.5 5 30  10/3 2.2 At goal, no change 5 2.5 5 5 5 2.5 5 30  8/22 2.4 At goal, no change 5 2.5 5 5 5 2.5 5 30  7/18 2.3  At goal, no change 5 2.5 5 5 5 2.5 5 30  6/14 2.3 At goal, no change 5 2.5 5 5 5 2.5 5 30  5/7 3.1 Above goal, continue 5 2.5 5 5 5 2.5 5 30 4/1 1.9 Below goal, continue 5 2.5 5 5 5 2.5 5 30   3/1 2.7 At goal, no change 5 2.5 5 5 5 2.5 5 30  2/4 2.2 At goal, no change 5 2.5 5 5 5 2.5 5 30  12/17 2.2 At goal, no change 5 2.5 5 5 5 2.5 5 30  10/31 2.1 At goal, no change 5 2.5 5 5 5 2.5 5 30  9/27 2.5 At goal, no change 5 2.5 5 5 5 2.5 5 30  8/30 2.0 At goal, no change 5 2.5 5 5 5 2.5 5 30  8/1 1.8 Below goal, continue 5 2.5 5 5 5 2.5 5 30  6/27 2.3 At goal, no change 5 2.5 5 5 5 2.5 5 30  6/1 2.5 At goal, no change 5 2.5 5 5 5 2.5 5 30  5/2 3.1 Above goal, continue 5 2.5 5 5 5 2.5 5 30  4/4 2.2 At goal, no change 5 2.5 5 5 5 2.5 5 30  3/14 1.9 Below goal, continue 5 2.5 5 5 5 2.5 5 30  2/9 2.2 At goal, no change 5 2.5 5 5 5 2.5 5 30  1/11 2.0 At goal, no change 5 2.5 5 5 5 2.5 5 30    Patient History:   Recent hospitalizations/HC visits 2/5 Dr. Daryl Chandra: no med changes   Recent medication changes None reported   Medications taken regularly that may interact with warfarin or alter INR No significant drug interactions identified   Warfarin dose taken as prescribed Yes   Signs/symptoms of bleeding Hx of subdural hematoma   Vitamin K intake -Normally has 2/3 can of spinach per week, 1-2 shantanu salads per week and 2-3 broccoli per week. -5/7/21 switched to 1/4 can of spinach on MWF  -6/28/21: decreased to 1/4 can spinach once per week since last visit  -7/26/21: 2/3 can spinach per week, but this is starting to bother his stomach and he may try to decrease a little. Recent vomiting/diarrhea/fever, changes in weight or activity level None reported   Tobacco or alcohol use Denies use of either   Upcoming surgeries or procedures None reported     Assessment/Plan:   Patient's INR was therapeutic today (2.1). Patient denies any warfarin dosing errors, medication changes, illness, or bleeding. Patient prefers to adjust vitamin K intake instead of warfarin dose if possible when INR is out of range.  Patient was instructed to continue stable warfarin regimen of 2.5 mg on Mon+Fri, and 5 mg on all other days. Repeat INR in 4 weeks. Patient was reminded to maintain consistent vitamin K intake and call with any bleeding, medication changes, or fever/vomiting/diarrhea. Patient understands dosing directions and information discussed. Dosing schedule and follow up appointment given to patient. Progress note routed to referring physician's office. Patient acknowledges working in consult agreement with pharmacist as referred by his/her physician. Next Clinic Appointment:  11/11    Please call Bin at [phone] with any questions. Thanks!   Melissa Shetty, PharmD, BCPS  [facility] Medication Management [facility]  [place]: [phone]  [facility]: [phone]  10/14/2021 9:59 AM    For Pharmacy Admin Tracking Only     Total # of Interventions Recommended: 0   Total # of Interventions Accepted: 0   Time Spent (min): 15

## 2021-11-01 RX ORDER — SOTALOL HYDROCHLORIDE 80 MG/1
TABLET ORAL
Qty: 180 TABLET | Refills: 3 | Status: SHIPPED
Start: 2021-11-01 | End: 2022-07-22 | Stop reason: CLARIF

## 2021-11-01 NOTE — TELEPHONE ENCOUNTER
Requested Prescriptions     Pending Prescriptions Disp Refills    sotalol (BETAPACE) 80 MG tablet [Pharmacy Med Name: SOTALOL 80 MG TABLET] 180 tablet 3     Sig: TAKE ONE TABLET BY MOUTH TWICE A DAY              Last Office Visit: 10/14/2021     Next Office Visit: 3/10/2022

## 2021-11-10 ENCOUNTER — ANTI-COAG VISIT (OUTPATIENT)
Dept: PHARMACY | Age: 71
End: 2021-11-10
Payer: MEDICARE

## 2021-11-10 DIAGNOSIS — Z79.01 LONG TERM CURRENT USE OF ANTICOAGULANT THERAPY: Primary | ICD-10-CM

## 2021-11-10 DIAGNOSIS — I48.91 ATRIAL FIBRILLATION, UNSPECIFIED TYPE (HCC): ICD-10-CM

## 2021-11-10 LAB — INTERNATIONAL NORMALIZATION RATIO, POC: 2.5

## 2021-11-10 PROCEDURE — 85610 PROTHROMBIN TIME: CPT | Performed by: PHARMACIST

## 2021-11-10 PROCEDURE — 99211 OFF/OP EST MAY X REQ PHY/QHP: CPT | Performed by: PHARMACIST

## 2021-11-10 NOTE — PROGRESS NOTES
ANTICOAGULATION SERVICE    Flor Li is a 66 y.o. male with PMHx significant for A-fib, DVT, internal hemorrhoids, subdural hematoma, lymphoma who presents to clinic 11/10/2021 for anticoagulation monitoring and adjustment.     Anticoagulation Indication(s):  Afib + h/o DVT  Referring Physician:  Dr. Young Brooks  Goal INR Range:  2-3  Duration of Anticoagulation Therapy:  Indefinite  Time of day dose taken:  AM  Product patient has at home:  warfarin 5 mg    INR Summary                           Warfarin regimen (mg)  Date INR   A/P   Sun Mon Tue Wed Thu Fri Sat Mg/wk  11/10 2.5 At goal, no change 5 2.5 5 5 5 2.5 5 30  10/14 2.1 At goal, no change 5 2.5 5 5 5 2.5 5 30  9/20 2.1 At goal, no change 5 2.5 5 5 5 2.5 5 30  8/23 2.3 At goal, no change 5 2.5 5 5 5 2.5 5 30  7/26 2.9 At goal, no change 5 2.5 5 5 5 2.5 5 30  6/28 2.8 At goal, no change 5 2.5 5 5 5 2.5 5 30  5/26 2.2 At goal, no change 5 2.5 5 5 5 2.5 5 30  5/7 3.4 Above goal, hold x 1 5 2.5 5 5 5 0/2.5 5 30  4/9 2.4 At goal, no change 5 2.5 5 5 5 2.5 5 30  2/24 2.1 At goal, no change 5 2.5 5 5 5 2.5 5 30  1/15 2.3 At goal, no change 5 2.5 5 5 5 2.5 5 30  12/17 2.5 At goal, no change 5 2.5 5 5 5 2.5 5 30  11/30 3.8 Above goal, hold x 1 5 0/2.5 5 5 5 2.5 5 30  11/2 2.5 At goal, no change 5 2.5 5 5 5 2.5 5 30  10/2 2.2 At goal, no change 5 2.5 5 5 5 2.5 5 30  8/20 2.5 At goal, no change 5 2.5 5 5 5 2.5 5 30  7/23 2.9 At goal, no change 5 2.5 5 5 5 2.5 5 30  6/4 2.4 At goal, no change 5 2.5 5 5 5 2.5 5 30  4/23 2.7 At goal, no change 5 2.5 5 5 5 2.5 5 30  2/20 2.9 At goal, no change 5 2.5 5 5 5 2.5 5 30  1/22 1.8 Below goal, continue 5 2.5 5 5 5 2.5 5 30  12/5 2.5 At goal, no change 5 2.5 5 5 5 2.5 5 30  11/7 2.2 At goal, no change 5 2.5 5 5 5 2.5 5 30  10/3 2.2 At goal, no change 5 2.5 5 5 5 2.5 5 30  8/22 2.4 At goal, no change 5 2.5 5 5 5 2.5 5 30  7/18 2.3  At goal, no change 5 2.5 5 5 5 2.5 5 30  6/14 2.3 At goal, no but has also been shown to increase INR level. As patient has been doing this for 3 weeks and keeps intake consistent I do not anticipate this will affect his INR levels. Patient prefers to adjust vitamin K intake instead of warfarin dose if possible when INR is out of range. Patient was instructed to continue stable warfarin regimen of 2.5 mg on Mon+Fri, and 5 mg on all other days. Repeat INR in 4 weeks. Patient was reminded to maintain consistent vitamin K intake and call with any bleeding, medication changes, or fever/vomiting/diarrhea. Patient understands dosing directions and information discussed. Dosing schedule and follow up appointment given to patient. Progress note routed to referring physician's office. Patient acknowledges working in consult agreement with pharmacist as referred by his/her physician. Next Clinic Appointment:  12/8    Please call [place] at [phone] with any questions. Thanks!   Ernesto Law, PharmD, [facility]  [facility]  [place]: [phone]  [place]: [phone]  11/10/2021 10:41 AM    For Pharmacy Admin Tracking Only     Total # of Interventions Recommended: 0   Total # of Interventions Accepted: 0   Time Spent (min): 15

## 2021-12-08 ENCOUNTER — ANTI-COAG VISIT (OUTPATIENT)
Dept: PHARMACY | Age: 71
End: 2021-12-08
Payer: MEDICARE

## 2021-12-08 DIAGNOSIS — Z79.01 LONG TERM CURRENT USE OF ANTICOAGULANT THERAPY: Primary | ICD-10-CM

## 2021-12-08 DIAGNOSIS — I48.91 ATRIAL FIBRILLATION, UNSPECIFIED TYPE (HCC): ICD-10-CM

## 2021-12-08 LAB — INTERNATIONAL NORMALIZATION RATIO, POC: 2.6

## 2021-12-08 PROCEDURE — 85610 PROTHROMBIN TIME: CPT | Performed by: PHARMACIST

## 2021-12-08 PROCEDURE — 99211 OFF/OP EST MAY X REQ PHY/QHP: CPT | Performed by: PHARMACIST

## 2021-12-08 NOTE — PROGRESS NOTES
ANTICOAGULATION SERVICE    Mauro Boyd is a 66 y.o. male with PMHx significant for A-fib, DVT, internal hemorrhoids, subdural hematoma, lymphoma who presents to clinic 12/8/2021 for anticoagulation monitoring and adjustment.     Anticoagulation Indication(s):  Afib + h/o DVT  Referring Physician:  Dr. Machado Areas  Goal INR Range:  2-3  Duration of Anticoagulation Therapy:  Indefinite  Time of day dose taken:  AM  Product patient has at home:  warfarin 5 mg    INR Summary                           Warfarin regimen (mg)  Date INR   A/P   Sun Mon Tue Wed Thu Fri Sat Mg/wk  12/8 2.6 At goal, no change 5 2.5 5 5 5 2.5 5 30  11/10 2.5 At goal, no change 5 2.5 5 5 5 2.5 5 30  10/14 2.1 At goal, no change 5 2.5 5 5 5 2.5 5 30  9/20 2.1 At goal, no change 5 2.5 5 5 5 2.5 5 30  8/23 2.3 At goal, no change 5 2.5 5 5 5 2.5 5 30  7/26 2.9 At goal, no change 5 2.5 5 5 5 2.5 5 30  6/28 2.8 At goal, no change 5 2.5 5 5 5 2.5 5 30  5/26 2.2 At goal, no change 5 2.5 5 5 5 2.5 5 30  5/7 3.4 Above goal, hold x 1 5 2.5 5 5 5 0/2.5 5 30  4/9 2.4 At goal, no change 5 2.5 5 5 5 2.5 5 30  2/24 2.1 At goal, no change 5 2.5 5 5 5 2.5 5 30  1/15 2.3 At goal, no change 5 2.5 5 5 5 2.5 5 30  12/17 2.5 At goal, no change 5 2.5 5 5 5 2.5 5 30  11/30 3.8 Above goal, hold x 1 5 0/2.5 5 5 5 2.5 5 30  11/2 2.5 At goal, no change 5 2.5 5 5 5 2.5 5 30  10/2 2.2 At goal, no change 5 2.5 5 5 5 2.5 5 30  8/20 2.5 At goal, no change 5 2.5 5 5 5 2.5 5 30  7/23 2.9 At goal, no change 5 2.5 5 5 5 2.5 5 30  6/4 2.4 At goal, no change 5 2.5 5 5 5 2.5 5 30  4/23 2.7 At goal, no change 5 2.5 5 5 5 2.5 5 30  2/20 2.9 At goal, no change 5 2.5 5 5 5 2.5 5 30  1/22 1.8 Below goal, continue 5 2.5 5 5 5 2.5 5 30  12/5 2.5 At goal, no change 5 2.5 5 5 5 2.5 5 30  11/7 2.2 At goal, no change 5 2.5 5 5 5 2.5 5 30  10/3 2.2 At goal, no change 5 2.5 5 5 5 2.5 5 30  8/22 2.4 At goal, no change 5 2.5 5 5 5 2.5 5 30  7/18 2.3  At goal, no change 5 2.5 5 5 5 2.5 5 30  6/14 2.3 At goal, no change 5 2.5 5 5 5 2.5 5 30  5/7 3.1 Above goal, continue 5 2.5 5 5 5 2.5 5 30   4/1 1.9 Below goal, continue 5 2.5 5 5 5 2.5 5 30   3/1 2.7 At goal, no change 5 2.5 5 5 5 2.5 5 30  2/4 2.2 At goal, no change 5 2.5 5 5 5 2.5 5 30  12/17 2.2 At goal, no change 5 2.5 5 5 5 2.5 5 30  10/31 2.1 At goal, no change 5 2.5 5 5 5 2.5 5 30  9/27 2.5 At goal, no change 5 2.5 5 5 5 2.5 5 30  8/30 2.0 At goal, no change 5 2.5 5 5 5 2.5 5 30  8/1 1.8 Below goal, continue 5 2.5 5 5 5 2.5 5 30  6/27 2.3 At goal, no change 5 2.5 5 5 5 2.5 5 30  6/1 2.5 At goal, no change 5 2.5 5 5 5 2.5 5 30  5/2 3.1 Above goal, continue 5 2.5 5 5 5 2.5 5 30  4/4 2.2 At goal, no change 5 2.5 5 5 5 2.5 5 30  3/14 1.9 Below goal, continue 5 2.5 5 5 5 2.5 5 30  2/9 2.2 At goal, no change 5 2.5 5 5 5 2.5 5 30  1/11 2.0 At goal, no change 5 2.5 5 5 5 2.5 5 30    Patient History:   Recent hospitalizations/HC visits 2/5 Dr. Sammie Mittal: no med changes   Recent medication changes None reported   Medications taken regularly that may interact with warfarin or alter INR No significant drug interactions identified   Warfarin dose taken as prescribed Yes   Signs/symptoms of bleeding Hx of subdural hematoma   Vitamin K intake -Normally has 2/3 can of spinach per week, 1-2 shantanu salads per week and 2-3 broccoli per week. -5/7/21 switched to 1/4 can of spinach on MWF  -6/28/21: decreased to 1/4 can spinach once per week since last visit  -7/26/21: 2/3 can spinach per week, but this is starting to bother his stomach and he may try to decrease a little. Recent vomiting/diarrhea/fever, changes in weight or activity level None reported   Tobacco or alcohol use Denies use of either   Upcoming surgeries or procedures None reported     Assessment/Plan:   Patient's INR was therapeutic today (2.6). Patient denies any warfarin dosing errors, medication changes, illness, or bleeding. He switched from green tea to chamomile tea 3 weeks ago.  Does not appear that either tea has had an effect on INR level. Patient prefers to adjust vitamin K intake instead of warfarin dose if possible when INR is out of range. Patient was instructed to continue stable warfarin regimen of 2.5 mg on Mon+Fri, and 5 mg on all other days. Repeat INR in 4 weeks. Patient was reminded to maintain consistent vitamin K intake and call with any bleeding, medication changes, or fever/vomiting/diarrhea. Patient understands dosing directions and information discussed. Dosing schedule and follow up appointment given to patient. Progress note routed to referring physician's office. Patient acknowledges working in consult agreement with pharmacist as referred by his/her physician. Next Clinic Appointment:  1/5    Please call Bin at [phone] with any questions. Thanks!   Merline Sherry, PharmD, BCPS  [facility]  [place]: [phone]  [place]: [phone]  12/8/2021 10:40 AM    For Pharmacy Admin Tracking Only     Total # of Interventions Recommended: 0   Total # of Interventions Accepted: 0   Time Spent (min): 15

## 2021-12-16 ENCOUNTER — VIRTUAL VISIT (OUTPATIENT)
Dept: PULMONOLOGY | Age: 71
End: 2021-12-16
Payer: MEDICARE

## 2021-12-16 DIAGNOSIS — I48.0 PAROXYSMAL ATRIAL FIBRILLATION (HCC): Chronic | ICD-10-CM

## 2021-12-16 DIAGNOSIS — G47.33 OBSTRUCTIVE SLEEP APNEA: Primary | ICD-10-CM

## 2021-12-16 DIAGNOSIS — K21.00 GASTROESOPHAGEAL REFLUX DISEASE WITH ESOPHAGITIS WITHOUT HEMORRHAGE: ICD-10-CM

## 2021-12-16 DIAGNOSIS — I10 ESSENTIAL HYPERTENSION: ICD-10-CM

## 2021-12-16 PROCEDURE — 4040F PNEUMOC VAC/ADMIN/RCVD: CPT | Performed by: NURSE PRACTITIONER

## 2021-12-16 PROCEDURE — 1123F ACP DISCUSS/DSCN MKR DOCD: CPT | Performed by: NURSE PRACTITIONER

## 2021-12-16 PROCEDURE — 3017F COLORECTAL CA SCREEN DOC REV: CPT | Performed by: NURSE PRACTITIONER

## 2021-12-16 PROCEDURE — G8427 DOCREV CUR MEDS BY ELIG CLIN: HCPCS | Performed by: NURSE PRACTITIONER

## 2021-12-16 PROCEDURE — 99214 OFFICE O/P EST MOD 30 MIN: CPT | Performed by: NURSE PRACTITIONER

## 2021-12-16 ASSESSMENT — SLEEP AND FATIGUE QUESTIONNAIRES
HOW LIKELY ARE YOU TO NOD OFF OR FALL ASLEEP WHILE WATCHING TV: 0
HOW LIKELY ARE YOU TO NOD OFF OR FALL ASLEEP WHILE SITTING QUIETLY AFTER LUNCH WITHOUT ALCOHOL: 0
HOW LIKELY ARE YOU TO NOD OFF OR FALL ASLEEP WHILE SITTING INACTIVE IN A PUBLIC PLACE: 0
HOW LIKELY ARE YOU TO NOD OFF OR FALL ASLEEP WHILE SITTING AND READING: 0
HOW LIKELY ARE YOU TO NOD OFF OR FALL ASLEEP WHILE LYING DOWN TO REST IN THE AFTERNOON WHEN CIRCUMSTANCES PERMIT: 0
HOW LIKELY ARE YOU TO NOD OFF OR FALL ASLEEP IN A CAR, WHILE STOPPED FOR A FEW MINUTES IN TRAFFIC: 0
ESS TOTAL SCORE: 0
HOW LIKELY ARE YOU TO NOD OFF OR FALL ASLEEP WHEN YOU ARE A PASSENGER IN A CAR FOR AN HOUR WITHOUT A BREAK: 0
HOW LIKELY ARE YOU TO NOD OFF OR FALL ASLEEP WHILE SITTING AND TALKING TO SOMEONE: 0

## 2021-12-16 NOTE — PROGRESS NOTES
Lara Angelo         : 1950    Diagnosis: [x] VERA (G47.33) [] CSA (G47.31) [] Apnea (G47.30)   Length of Need: [x] 12 Months [] 99 Months [] Other:    Machine (RENETTA!): [] Respironics Dream Station   2   Auto [] ResMed AirSense     Auto [] Other:     []  CPAP () [] Bilevel ()   Mode: [] Auto [] Spontaneous    Mode: [] Auto [] Spontaneous            Comfort Settings:   - Ramp Pressure:  cmH2O                                        - Ramp time: 15 min                                     -  Flex/EPR - 3 full time                                    - For ResMed Bilevel (TiMax-4 sec   TiMin- 0.2 sec)     Humidifier: [x] Heated ()        [x] Water chamber replacement ()/ 1 per 6 months        Mask:   [] Nasal () /1 per 3 months [x] Full Face () /1 per 3 months   [] Patient choice -Size and fit mask [x] Patient Choice - Size and fit mask   [] Dispense:  [] Dispense:    [] Headgear () / 1 per 3 months [x] Headgear () / 1 per 3 months   [] Replacement Nasal Cushion ()/2 per month [x] Interface Replacement ()/1 per month   [] Replacement Nasal Pillows ()/2 per month         Tubing: [x] Heated ()/1 per 3 months    [] Standard ()/1 per 3 months [] Other:           Filters: [x] Non-disposable ()/1 per 6 months     [x] Ultra-Fine, Disposable ()/2 per month        Miscellaneous: [] Chin Strap ()/ 1 per 6 months [] O2 bleed-in:       LPM   [] Oximetry on CPAP/Bilevel []  Other:    [x] Modem: ()         Start Order Date: 21    MEDICAL JUSTIFICATION:  I, the undersigned, certify that the above prescribed supplies are medically necessary for this patients wellbeing. In my opinion, the supplies are both reasonable and necessary in reference to accepted standards of medicalpractice in treatment of this patients condition.     LORETTA Alarcon CNP      NPI: [id number]       Order Signed Date: 21    Electronically signed by LORETTA Sutherland - CNP on 2021 at 10:46 AM    Alice Luis  1950  [address]  [phone] (home)   [phone] (mobile)      Insurance Info (confirm with patient if correct):  Payor/Plan Subscr  Sex Relation Sub. Ins. ID Effective Group Num   1. MEDICARE - [facility] 1950 Male Self [id number] 1/1/15                                    [address]    2.  [facility] 1950 Male Self [id number] 1/1/15 N                                   JAYDEN/Casey Lemos [id number]

## 2021-12-16 NOTE — PROGRESS NOTES
Cindy Schofield MD, FAASM, [facility]  Bertrand Moore, MSN, RN, [address] [address]  Dept: [phone]  Dept Fax: [phone]  Loc: [phone]    Subjective:     Patient ID: Kash Oden is a 66 y.o. male. Chief Complaint   Patient presents with    Sleep Apnea       HPI:      Sleep Medicine Video Visit    Pursuant to the emergency declaration under the [address] [place] [address] waiver authority and the [employer] Act this Telephone Visit was insisted, with patient's consent, to reduce the patient's risk of exposure to COVID-19 and provide continuity of care for an established patient. Services were provided through a synchronous discussion over a telephone and/or Video chat to substitute for in-person clinic visit, and coded as such. While patient is at home.     Machine Modem/Download Info:  Compliance (hours/night): 9 hrs/night  Download AHI (/hour): 15.2 /HR  Average CPAP Pressure : 16 cmH2O           APAP - Settings  Pressure Min: 11 cmH2O  Pressure Max: 18 cmH2O                 Comfort Settings  Humidity Level (0-8): 3  Flex/EPR (0-3): 3 PAP Mask  Mask Type: Full Face mask  Clinically Relevant Leak: No     [place] - Total score: 0    Follow-up :     Last Visit : December 2020      Subjective Health Changes: None      Over Night Oximetry: [] Yes  [] No  [x] NA [] WNL   Using O2: [] Yes  [] No  [x] NA   Patient is compliant with the machine  [x] Yes  [] No [] Per patient   Feeling rested when using the machine   [x] Yes  [] No     Pressure is comfortable with inspiration and expiration  [x] Yes  [] No   ([x] NA   [] Feeling of suffocation  [] Feeling like not enough air    [] To much pressure)     Noticed changes in pressure  [x] NA  [] Yes    [] No     Mask is fitting well  [x] Yes  [] No   Noting Mask Air Leak  [] Yes  [x] No Having painful Aerophagia  [] Yes  [x] No   Nocturia   4  per night. Having  HA upon waking  [] Yes  [x] No   Dry mouth upon waking   Dry Nose  Dry Eyes  [x] Yes  [] No   Congestion upon waking   [] Yes  [x] No    Nose Bleeds  [] Yes  [x] No   Using Sleep Aides  [x] NA  [] OTC  [] Per our office   [] Per another provider   Understands how to change humidification and/or tubing temperature for comfort while at home  [x] Yes  [] No     Difficulties falling asleep  [] Yes  [x] No   Difficulties staying asleep  [] Yes  [x] No   Approximate time to bed  10:30-11pm   Approximate wake time  7:30-8am   Taking Naps  occasional   If taking naps usual length  5-15 min    If taking naps using the machine [] NA  [] Yes    [x] No    [] With and With out    Drowsy when driving  [] Yes  [x] No     Does patient carry a DOT/CDL  [] Yes  [x] No     Does patient carry FAA/[employer] License   [] Yes  [x] No      Any concerns noted with the machine at this time  [] Yes  [x] No       Assessment/Plan:   1. Obstructive sleep apnea  Assessment & Plan:  After downloading data and reviewing  Reviewed compliance download with pt. Supplies and parts as needed for his machine. These are medically necessary. Continue medications per his PCP and other physicians. Limit caffeine use after 3pm.    The chronic medical conditions listed are directly related to the primary diagnosis listed above. The management of the primary diagnosis affects the secondary diagnosis and vice versa    Patient is complaint encouraged to maintain compliance to aide on controlling other stated healthcare concerns. 2. Paroxysmal atrial fibrillation (HCC)  Assessment & Plan:  Chronic- stable. After speaking with patient:    Agree with current plan, and would agree to continue this plan per prescribing and managing physician. 3. Essential hypertension  Assessment & Plan:  Chronic- stable.       After speaking with patient:    Agree with current plan, and would agree to continue this plan per prescribing and managing physician. 4. Gastroesophageal reflux disease with esophagitis without hemorrhage  Assessment & Plan:  Chronic- stable. After speaking with patient:    Agree with current plan, and would agree to continue this plan per prescribing and managing physician. - After pulling data and reviewing it   - Reviewed compliance download with patient    -Medically necessary supplies and parts as needed for his machine.   - Continue medications per his primary care provider and other physicians.   - Encouraged to limit caffeine use after 3pm.    -  Encouraged him to work on weight loss through diet and exercise  - Educated not to drive when feeling sleepy   - Patient using [employer]  - Tube Temperature  Humidifier  (if you are dry turn it high)  Napping with the machine  Office locations  Compliance  Follow up  Monitoring AHI  The risk of undercontrolling Obstructive sleep apnea  After speaking to the patient, patient is currently stable. We will continue with the current machine settings  Recall Information and DME contact     The chronic medical conditions listed are directly related to the primary diagnosis listed above. The management of the primary diagnosis affects the secondary diagnosis and vice versa.     - Will follow up in off in 12 months    Electronically signed by  Jean Mccoy, STEVO, RN, CNP on 12/16/2021 at 10:40 AM

## 2022-01-05 ENCOUNTER — ANTI-COAG VISIT (OUTPATIENT)
Dept: PHARMACY | Age: 72
End: 2022-01-05
Payer: MEDICARE

## 2022-01-05 DIAGNOSIS — I48.91 ATRIAL FIBRILLATION, UNSPECIFIED TYPE (HCC): ICD-10-CM

## 2022-01-05 DIAGNOSIS — Z79.01 LONG TERM CURRENT USE OF ANTICOAGULANT THERAPY: Primary | ICD-10-CM

## 2022-01-05 LAB — INTERNATIONAL NORMALIZATION RATIO, POC: 2.6

## 2022-01-05 PROCEDURE — 85610 PROTHROMBIN TIME: CPT

## 2022-01-05 PROCEDURE — 99211 OFF/OP EST MAY X REQ PHY/QHP: CPT

## 2022-01-05 NOTE — PROGRESS NOTES
ANTICOAGULATION SERVICE    Marcelo Browne is a 66 y.o. male with PMHx significant for A-fib, DVT, internal hemorrhoids, subdural hematoma, lymphoma who presents to clinic 1/5/2022 for anticoagulation monitoring and adjustment.     Anticoagulation Indication(s):  Afib + h/o DVT  Referring Physician:  Dr. Lenora Bardales  Goal INR Range:  2-3  Duration of Anticoagulation Therapy:  Indefinite  Time of day dose taken:  AM  Product patient has at home:  warfarin 5 mg    INR Summary                           Warfarin regimen (mg)  Date INR   A/P   Sun Mon Tue Wed Thu Fri Sat Mg/wk  1/5 2.6 At goal, no change 5 2.5 5 5 5 2.5 5 30  12/8 2.6 At goal, no change 5 2.5 5 5 5 2.5 5 30  11/10 2.5 At goal, no change 5 2.5 5 5 5 2.5 5 30  10/14 2.1 At goal, no change 5 2.5 5 5 5 2.5 5 30  9/20 2.1 At goal, no change 5 2.5 5 5 5 2.5 5 30  8/23 2.3 At goal, no change 5 2.5 5 5 5 2.5 5 30  7/26 2.9 At goal, no change 5 2.5 5 5 5 2.5 5 30  6/28 2.8 At goal, no change 5 2.5 5 5 5 2.5 5 30  5/26 2.2 At goal, no change 5 2.5 5 5 5 2.5 5 30  5/7 3.4 Above goal, hold x 1 5 2.5 5 5 5 0/2.5 5 30  4/9 2.4 At goal, no change 5 2.5 5 5 5 2.5 5 30  2/24 2.1 At goal, no change 5 2.5 5 5 5 2.5 5 30  1/15 2.3 At goal, no change 5 2.5 5 5 5 2.5 5 30  12/17 2.5 At goal, no change 5 2.5 5 5 5 2.5 5 30  11/30 3.8 Above goal, hold x 1 5 0/2.5 5 5 5 2.5 5 30  11/2 2.5 At goal, no change 5 2.5 5 5 5 2.5 5 30  10/2 2.2 At goal, no change 5 2.5 5 5 5 2.5 5 30  8/20 2.5 At goal, no change 5 2.5 5 5 5 2.5 5 30  7/23 2.9 At goal, no change 5 2.5 5 5 5 2.5 5 30  6/4 2.4 At goal, no change 5 2.5 5 5 5 2.5 5 30  4/23 2.7 At goal, no change 5 2.5 5 5 5 2.5 5 30  2/20 2.9 At goal, no change 5 2.5 5 5 5 2.5 5 30  1/22 1.8 Below goal, continue 5 2.5 5 5 5 2.5 5 30  12/5 2.5 At goal, no change 5 2.5 5 5 5 2.5 5 30  11/7 2.2 At goal, no change 5 2.5 5 5 5 2.5 5 30  10/3 2.2 At goal, no change 5 2.5 5 5 5 2.5 5 30  8/22 2.4 At goal, no change 5 2.5 5 5 5 2.5 5 30  7/18 2.3  At goal, no change 5 2.5 5 5 5 2.5 5 30  6/14 2.3 At goal, no change 5 2.5 5 5 5 2.5 5 30  5/7 3.1 Above goal, continue 5 2.5 5 5 5 2.5 5 30   4/1 1.9 Below goal, continue 5 2.5 5 5 5 2.5 5 30   3/1 2.7 At goal, no change 5 2.5 5 5 5 2.5 5 30  2/4 2.2 At goal, no change 5 2.5 5 5 5 2.5 5 30  12/17 2.2 At goal, no change 5 2.5 5 5 5 2.5 5 30  10/31 2.1 At goal, no change 5 2.5 5 5 5 2.5 5 30  9/27 2.5 At goal, no change 5 2.5 5 5 5 2.5 5 30  8/30 2.0 At goal, no change 5 2.5 5 5 5 2.5 5 30  8/1 1.8 Below goal, continue 5 2.5 5 5 5 2.5 5 30  6/27 2.3 At goal, no change 5 2.5 5 5 5 2.5 5 30  6/1 2.5 At goal, no change 5 2.5 5 5 5 2.5 5 30  5/2 3.1 Above goal, continue 5 2.5 5 5 5 2.5 5 30  4/4 2.2 At goal, no change 5 2.5 5 5 5 2.5 5 30  3/14 1.9 Below goal, continue 5 2.5 5 5 5 2.5 5 30  2/9 2.2 At goal, no change 5 2.5 5 5 5 2.5 5 30  1/11 2.0 At goal, no change 5 2.5 5 5 5 2.5 5 30    Patient History:   Recent hospitalizations/HC visits 2/5 Dr. Ovi Esquivel: no med changes   Recent medication changes None reported   Medications taken regularly that may interact with warfarin or alter INR No significant drug interactions identified   Warfarin dose taken as prescribed Yes   Signs/symptoms of bleeding Hx of subdural hematoma   Vitamin K intake -Normally has 2/3 can of spinach per week, 1-2 shantanu salads per week and 2-3 broccoli per week. -5/7/21 switched to 1/4 can of spinach on MWF  -6/28/21: decreased to 1/4 can spinach once per week since last visit  -7/26/21: 2/3 can spinach per week, but this is starting to bother his stomach and he may try to decrease a little.   -1/5/22: patient no longer eating spinach, eats broccoli 3-4 times per week    Recent vomiting/diarrhea/fever, changes in weight or activity level None reported   Tobacco or alcohol use Denies use of either   Upcoming surgeries or procedures None reported     Assessment/Plan:   Patient's INR was therapeutic today (2.6).  Patient denies any warfarin dosing errors, medication changes, illness, or bleeding. He is leaving to go to [place] on 2/15/22 and plans to return 2/28/22. Patient prefers to adjust vitamin K intake instead of warfarin dose if possible when INR is out of range. Patient was instructed to continue stable warfarin regimen of 2.5 mg on Mon+Fri, and 5 mg on all other days. Repeat INR in 5 weeks due to schedule (patient prefers to line up with other apts). Patient was reminded to maintain consistent vitamin K intake and call with any bleeding, medication changes, or fever/vomiting/diarrhea. Patient understands dosing directions and information discussed. Dosing schedule and follow up appointment given to patient. Progress note routed to referring physician's office. Patient acknowledges working in consult agreement with pharmacist as referred by his/her physician. Next Clinic Appointment:  2/9    Please call [facility] at [phone] with any questions. Thanks!   Castro Lemus, PharmD  PGY1 Pharmacy Resident  Medication Management Clinic  1/5/2022 8:58 AM    For Pharmacy Admin Tracking Only   Total # of Interventions Recommended: 0   Total # of Interventions Accepted: 0   Time Spent (min): 15

## 2022-02-09 ENCOUNTER — ANTI-COAG VISIT (OUTPATIENT)
Dept: PHARMACY | Age: 72
End: 2022-02-09
Payer: MEDICARE

## 2022-02-09 DIAGNOSIS — I48.91 ATRIAL FIBRILLATION, UNSPECIFIED TYPE (HCC): ICD-10-CM

## 2022-02-09 DIAGNOSIS — Z79.01 LONG TERM CURRENT USE OF ANTICOAGULANT THERAPY: Primary | ICD-10-CM

## 2022-02-09 LAB — INTERNATIONAL NORMALIZATION RATIO, POC: 2.3

## 2022-02-09 PROCEDURE — 99211 OFF/OP EST MAY X REQ PHY/QHP: CPT | Performed by: PHARMACIST

## 2022-02-09 PROCEDURE — 85610 PROTHROMBIN TIME: CPT | Performed by: PHARMACIST

## 2022-02-09 NOTE — PROGRESS NOTES
ANTICOAGULATION SERVICE    Quebeck Medicine is a 66 y.o. male with PMHx significant for A-fib, DVT, internal hemorrhoids, subdural hematoma, lymphoma who presents to clinic 2/9/2022 for anticoagulation monitoring and adjustment.     Anticoagulation Indication(s):  Afib + h/o DVT  Referring Physician:  Dr. Bernard Pressley  Goal INR Range:  2-3  Duration of Anticoagulation Therapy:  Indefinite  Time of day dose taken:  AM  Product patient has at home:  warfarin 5 mg    INR Summary                           Warfarin regimen (mg)  Date INR   A/P   Sun Mon Tue Wed Thu Fri Sat Mg/wk  2/9 2.3 At goal, no change 5 2.5 5 5 5 2.5 5 30  1/5 2.6 At goal, no change 5 2.5 5 5 5 2.5 5 30  12/8 2.6 At goal, no change 5 2.5 5 5 5 2.5 5 30  11/10 2.5 At goal, no change 5 2.5 5 5 5 2.5 5 30  10/14 2.1 At goal, no change 5 2.5 5 5 5 2.5 5 30  9/20 2.1 At goal, no change 5 2.5 5 5 5 2.5 5 30  8/23 2.3 At goal, no change 5 2.5 5 5 5 2.5 5 30  7/26 2.9 At goal, no change 5 2.5 5 5 5 2.5 5 30  6/28 2.8 At goal, no change 5 2.5 5 5 5 2.5 5 30  5/26 2.2 At goal, no change 5 2.5 5 5 5 2.5 5 30  5/7 3.4 Above goal, hold x 1 5 2.5 5 5 5 0/2.5 5 30  4/9 2.4 At goal, no change 5 2.5 5 5 5 2.5 5 30  2/24 2.1 At goal, no change 5 2.5 5 5 5 2.5 5 30  1/15 2.3 At goal, no change 5 2.5 5 5 5 2.5 5 30  12/17 2.5 At goal, no change 5 2.5 5 5 5 2.5 5 30  11/30 3.8 Above goal, hold x 1 5 0/2.5 5 5 5 2.5 5 30  11/2 2.5 At goal, no change 5 2.5 5 5 5 2.5 5 30  10/2 2.2 At goal, no change 5 2.5 5 5 5 2.5 5 30  8/20 2.5 At goal, no change 5 2.5 5 5 5 2.5 5 30  7/23 2.9 At goal, no change 5 2.5 5 5 5 2.5 5 30  6/4 2.4 At goal, no change 5 2.5 5 5 5 2.5 5 30  4/23 2.7 At goal, no change 5 2.5 5 5 5 2.5 5 30  2/20 2.9 At goal, no change 5 2.5 5 5 5 2.5 5 30  1/22 1.8 Below goal, continue 5 2.5 5 5 5 2.5 5 30  12/5 2.5 At goal, no change 5 2.5 5 5 5 2.5 5 30  11/7 2.2 At goal, no change 5 2.5 5 5 5 2.5 5 30  10/3 2.2 At goal, no change 5 2.5 5 5 5 2.5 5 30  8/22 2.4 At goal, no change 5 2.5 5 5 5 2.5 5 30  7/18 2.3  At goal, no change 5 2.5 5 5 5 2.5 5 30  6/14 2.3 At goal, no change 5 2.5 5 5 5 2.5 5 30  5/7 3.1 Above goal, continue 5 2.5 5 5 5 2.5 5 30   4/1 1.9 Below goal, continue 5 2.5 5 5 5 2.5 5 30   3/1 2.7 At goal, no change 5 2.5 5 5 5 2.5 5 30  2/4 2.2 At goal, no change 5 2.5 5 5 5 2.5 5 30  12/17 2.2 At goal, no change 5 2.5 5 5 5 2.5 5 30  10/31 2.1 At goal, no change 5 2.5 5 5 5 2.5 5 30  9/27 2.5 At goal, no change 5 2.5 5 5 5 2.5 5 30  8/30 2.0 At goal, no change 5 2.5 5 5 5 2.5 5 30  8/1 1.8 Below goal, continue 5 2.5 5 5 5 2.5 5 30  6/27 2.3 At goal, no change 5 2.5 5 5 5 2.5 5 30  6/1 2.5 At goal, no change 5 2.5 5 5 5 2.5 5 30  5/2 3.1 Above goal, continue 5 2.5 5 5 5 2.5 5 30  4/4 2.2 At goal, no change 5 2.5 5 5 5 2.5 5 30  3/14 1.9 Below goal, continue 5 2.5 5 5 5 2.5 5 30  2/9 2.2 At goal, no change 5 2.5 5 5 5 2.5 5 30  1/11 2.0 At goal, no change 5 2.5 5 5 5 2.5 5 30    Patient History:   Recent hospitalizations/HC visits 2/5 Dr. Gui Matson: no med changes   Recent medication changes None reported   Medications taken regularly that may interact with warfarin or alter INR No significant drug interactions identified   Warfarin dose taken as prescribed Yes   Signs/symptoms of bleeding Hx of subdural hematoma   Vitamin K intake -Normally has 2/3 can of spinach per week, 1-2 shantanu salads per week and 2-3 broccoli per week.   -5/7/21 switched to 1/4 can of spinach on MWF  -6/28/21: decreased to 1/4 can spinach once per week since last visit  -7/26/21: 2/3 can spinach per week, but this is starting to bother his stomach and he may try to decrease a little.   -1/5/22: patient no longer eating spinach, eats broccoli 3-4 times per week    Recent vomiting/diarrhea/fever, changes in weight or activity level None reported   Tobacco or alcohol use Denies use of either   Upcoming surgeries or procedures None reported     Assessment/Plan:   Patient's INR was therapeutic today (2.3). Patient denies any warfarin dosing errors, medication changes, illness, or bleeding. He is leaving to go to [place] on 2/15/22 and plans to return 3/8/22. Patient prefers to adjust vitamin K intake instead of warfarin dose if possible when INR is out of range. Patient was instructed to continue stable warfarin regimen of 2.5 mg on Mon+Fri, and 5 mg on all other days. Repeat INR in 5 weeks. Could extend to 6 week follow-up if patient requests in the future given INR stable > 6 months. Patient was reminded to maintain consistent vitamin K intake and call with any bleeding, medication changes, or fever/vomiting/diarrhea. Patient understands dosing directions and information discussed. Dosing schedule and follow up appointment given to patient. Progress note routed to referring physician's office. Patient acknowledges working in consult agreement with pharmacist as referred by his/her physician. Next Clinic Appointment:  3/16    Please call [facility] at [phone] with any questions. Thanks!   Codi Moody, PharmD, BCPS  [facility]  Diane: [phone]  Caitie: [phone]  2/9/2022 11:52 AM    For Pharmacy Admin Tracking Only   Total # of Interventions Recommended: 0   Total # of Interventions Accepted: 0   Time Spent (min): 15

## 2022-03-10 ENCOUNTER — OFFICE VISIT (OUTPATIENT)
Dept: CARDIOLOGY CLINIC | Age: 72
End: 2022-03-10
Payer: MEDICARE

## 2022-03-10 VITALS
HEART RATE: 68 BPM | DIASTOLIC BLOOD PRESSURE: 80 MMHG | BODY MASS INDEX: 28.25 KG/M2 | WEIGHT: 220 LBS | SYSTOLIC BLOOD PRESSURE: 134 MMHG

## 2022-03-10 DIAGNOSIS — I34.0 NONRHEUMATIC MITRAL VALVE REGURGITATION: ICD-10-CM

## 2022-03-10 DIAGNOSIS — R00.2 PALPITATION: ICD-10-CM

## 2022-03-10 DIAGNOSIS — I48.20 CHRONIC ATRIAL FIBRILLATION (HCC): Primary | ICD-10-CM

## 2022-03-10 PROCEDURE — G8484 FLU IMMUNIZE NO ADMIN: HCPCS | Performed by: INTERNAL MEDICINE

## 2022-03-10 PROCEDURE — 1036F TOBACCO NON-USER: CPT | Performed by: INTERNAL MEDICINE

## 2022-03-10 PROCEDURE — 3017F COLORECTAL CA SCREEN DOC REV: CPT | Performed by: INTERNAL MEDICINE

## 2022-03-10 PROCEDURE — G8427 DOCREV CUR MEDS BY ELIG CLIN: HCPCS | Performed by: INTERNAL MEDICINE

## 2022-03-10 PROCEDURE — G8417 CALC BMI ABV UP PARAM F/U: HCPCS | Performed by: INTERNAL MEDICINE

## 2022-03-10 PROCEDURE — 99214 OFFICE O/P EST MOD 30 MIN: CPT | Performed by: INTERNAL MEDICINE

## 2022-03-10 PROCEDURE — 1123F ACP DISCUSS/DSCN MKR DOCD: CPT | Performed by: INTERNAL MEDICINE

## 2022-03-10 PROCEDURE — 4040F PNEUMOC VAC/ADMIN/RCVD: CPT | Performed by: INTERNAL MEDICINE

## 2022-03-10 NOTE — PROGRESS NOTES
Subjective:      Patient ID: Sohan Ngo is a 66 y.o. male. SÁNCHEZ Pat is here today for follow up afib/palp/mr. No complaints except had arrhythmia when had cold. Otherwise rhythm stable. Continues exercising. Walking and gym every other day. [place] Rhythm good. No sob. No edema. No pnd or orthopnea. No syncope. Wt down. Past Medical History:   Diagnosis Date    AF (atrial fibrillation) (HCC)     Aortic valve disease     leaking no problems    Lymphoma [facility])      Past Surgical History:   Procedure Laterality Date    APPENDECTOMY      COLONOSCOPY  8/18/2006     Dr. Barnes Orf - internal hemorrhoids , hyperplastic polyp     CYSTOSCOPY  1-11-10     Dr. Zac Dyer ureteral stent     LYMPH NODE BIOPSY  5/11/11    RIGHT CERVICAL    LYMPH NODE DISSECTION N/A 2011    TONSILLECTOMY           Allergies   Allergen Reactions    Gentamicin Other (See Comments)     Dad had reaction        Social History     Socioeconomic History    Marital status:      Spouse name: Not on file    Number of children: 0    Years of education: Not on file    Highest education level: Not on file   Occupational History    Occupation: retired   Tobacco Use    Smoking status: Never Smoker    Smokeless tobacco: Never Used   Vaping Use    Vaping Use: Never used   Substance and Sexual Activity    Alcohol use: No     Alcohol/week: 0.0 standard drinks    Drug use: No    Sexual activity: Yes     Partners: Female   Other Topics Concern    Not on file   Social History Narrative    Not on file     Social Determinants of Health     Financial Resource Strain:     Difficulty of Paying Living Expenses: Not on file   Food Insecurity:     Worried About [address] in the Last Year: Not on file    [address] in the Last Year: Not on file   Transportation Needs:     Lack of Transportation (Medical): Not on file    Lack of Transportation (Non-Medical):  Not on file   Physical Activity:     Days of Exercise per Week: Not on file    Minutes of Exercise per Session: Not on file   Stress:     Feeling of Stress : Not on file   Social Connections:     Frequency of Communication with Friends and Family: Not on file    Frequency of Social Gatherings with Friends and Family: Not on file    Attends Yarsanism Services: Not on file    Active Member of [address] [place] [address] [employer] or Organizations: Not on file    Attends Club or Organization Meetings: Not on file    Marital Status: Not on file   Intimate Partner Violence:     Fear of Current or Ex-Partner: Not on file    Emotionally Abused: Not on file    Physically Abused: Not on file    Sexually Abused: Not on file   Housing Stability:     Unable to Pay for Housing in the Last Year: Not on file    Number of [place] in the Last Year: Not on file    Unstable Housing in the Last Year: Not on file        Patient has a family history includes Heart Disease in his mother. Patient  has a past medical history of AF (atrial fibrillation) ([facility] [address] Aortic valve disease, and Lymphoma ([facility] [address] Current Outpatient Medications   Medication Sig Dispense Refill    sotalol (BETAPACE) 80 MG tablet TAKE ONE TABLET BY MOUTH TWICE A  tablet 3    warfarin (COUMADIN) 5 MG tablet TAKE ONE-HALF TABLET BY MOUTH DAILY ON MONDAYS AND FRIDAYS AND TAKE ONE TABLET BY MOUTH DAILY ON ALL OTHER DAYS 90 tablet 3     No current facility-administered medications for this visit. Vitals:    03/10/22 1026   BP: 134/80   Pulse: 68     Wt 220    Review of Systems   Constitutional: Negative for activity change and fatigue. Respiratory: Negative for apnea, cough, choking, chest tightness and shortness of breath. Cardiovascular: Negative for chest pain, palpitations and leg swelling. No PND or orthopnea. No tachycardia. Gastrointestinal: Negative for abdominal distention. Musculoskeletal: Negative for myalgias. Neurological: Negative for dizziness, syncope and light-headedness. Psychiatric/Behavioral: Negative for behavioral problems, confusion and agitation. All other systems reviewed negative as done. Objective:   Physical Exam   Constitutional: He is oriented to person, place, and time. He appears well-developed and well-nourished. No distress. HENT:   Head: Normocephalic and atraumatic. Eyes: Conjunctivae and EOM are normal. Right eye exhibits no discharge. Left eye exhibits no discharge. Neck: Normal range of motion. Neck supple. No JVD present. Cardiovascular:  irreg/irregRate rhythm, S1 normal, S2 normal and normal heart sounds. Exam reveals no gallop. Pulses:       Radial pulses are 2+ on the right side, and 2+ on the left side. Pulmonary/Chest: Effort normal and breath sounds normal. No respiratory distress. He has no wheezes. He has no rales. Abdominal: Soft. Bowel sounds are normal. No tenderness. Musculoskeletal: Normal range of motion. He exhibits no edema. Tr-+ R  Neurological: He is alert and oriented to person, place, and time. Skin: Skin is warm and dry. Psychiatric: He has a normal mood and affect. His behavior is normal. Thought content normal.       Assessment:       Diagnosis Orders   1. Chronic atrial fibrillation (HCC)     2. Palpitation     3. Nonrheumatic mitral valve regurgitation             Plan:      CV stable. Active. Rhythm stable outside of vaccine. HR normal.  Will continue sotalol to 40 mg bid. No real changes with lower dose. Continues exercising. On AC. No bleeding issues. Will continue sotalol 80 mg bid and coumadin. Remains compensated. No changes. Reviewed previous records and testing including echo 7/20. Continue to monitor. Follow up 4 months.

## 2022-03-14 ENCOUNTER — TELEPHONE (OUTPATIENT)
Dept: PULMONOLOGY | Age: 72
End: 2022-03-14

## 2022-03-15 ENCOUNTER — TELEPHONE (OUTPATIENT)
Dept: PULMONOLOGY | Age: 72
End: 2022-03-15

## 2022-03-15 NOTE — TELEPHONE ENCOUNTER
Pt has received the Dream Station 2. Explained to pt that he will need to run  It for a few minutes and a message will let him know it is ready for use. Pt to call this offie to insure his modem is working.

## 2022-03-16 ENCOUNTER — ANTI-COAG VISIT (OUTPATIENT)
Dept: PHARMACY | Age: 72
End: 2022-03-16
Payer: MEDICARE

## 2022-03-16 ENCOUNTER — TELEPHONE (OUTPATIENT)
Dept: PULMONOLOGY | Age: 72
End: 2022-03-16

## 2022-03-16 DIAGNOSIS — Z79.01 LONG TERM CURRENT USE OF ANTICOAGULANT THERAPY: Primary | ICD-10-CM

## 2022-03-16 LAB — INTERNATIONAL NORMALIZATION RATIO, POC: 2.3

## 2022-03-16 PROCEDURE — 99211 OFF/OP EST MAY X REQ PHY/QHP: CPT

## 2022-03-16 PROCEDURE — 85610 PROTHROMBIN TIME: CPT

## 2022-03-16 NOTE — TELEPHONE ENCOUNTER
Spoke with pt to let him know the modem on the new unit is sending and the pressure are the same as the Dreamstation 1.zpt to strart using the new unit.

## 2022-03-16 NOTE — PROGRESS NOTES
ANTICOAGULATION SERVICE    Marcelo Browne is a 66 y.o. male with PMHx significant for A-fib, DVT, internal hemorrhoids, subdural hematoma, lymphoma who presents to clinic 3/16/2022 for anticoagulation monitoring and adjustment.     Anticoagulation Indication(s):  Afib + h/o DVT  Referring Physician:  Dr. Lenora Bardales  Goal INR Range:  2-3  Duration of Anticoagulation Therapy:  Indefinite  Time of day dose taken:  AM  Product patient has at home:  warfarin 5 mg    INR Summary                           Warfarin regimen (mg)  Date INR   A/P   Sun Mon Tue Wed Thu Fri Sat Mg/wk  3/16 2.3 At goal, no change 5 2.5 5 5 5 2.5 5 30  2/9 2.3 At goal, no change 5 2.5 5 5 5 2.5 5 30  1/5 2.6 At goal, no change 5 2.5 5 5 5 2.5 5 30  12/8 2.6 At goal, no change 5 2.5 5 5 5 2.5 5 30  11/10 2.5 At goal, no change 5 2.5 5 5 5 2.5 5 30  10/14 2.1 At goal, no change 5 2.5 5 5 5 2.5 5 30  9/20 2.1 At goal, no change 5 2.5 5 5 5 2.5 5 30  8/23 2.3 At goal, no change 5 2.5 5 5 5 2.5 5 30  7/26 2.9 At goal, no change 5 2.5 5 5 5 2.5 5 30  6/28 2.8 At goal, no change 5 2.5 5 5 5 2.5 5 30  5/26 2.2 At goal, no change 5 2.5 5 5 5 2.5 5 30  5/7 3.4 Above goal, hold x 1 5 2.5 5 5 5 0/2.5 5 30  4/9 2.4 At goal, no change 5 2.5 5 5 5 2.5 5 30  2/24 2.1 At goal, no change 5 2.5 5 5 5 2.5 5 30  1/15 2.3 At goal, no change 5 2.5 5 5 5 2.5 5 30  12/17 2.5 At goal, no change 5 2.5 5 5 5 2.5 5 30  11/30 3.8 Above goal, hold x 1 5 0/2.5 5 5 5 2.5 5 30  11/2 2.5 At goal, no change 5 2.5 5 5 5 2.5 5 30  10/2 2.2 At goal, no change 5 2.5 5 5 5 2.5 5 30  8/20 2.5 At goal, no change 5 2.5 5 5 5 2.5 5 30  7/23 2.9 At goal, no change 5 2.5 5 5 5 2.5 5 30  6/4 2.4 At goal, no change 5 2.5 5 5 5 2.5 5 30  4/23 2.7 At goal, no change 5 2.5 5 5 5 2.5 5 30  2/20 2.9 At goal, no change 5 2.5 5 5 5 2.5 5 30  1/22 1.8 Below goal, continue 5 2.5 5 5 5 2.5 5 30  12/5 2.5 At goal, no change 5 2.5 5 5 5 2.5 5 30  11/7 2.2 At goal, no change 5 2.5 5 5 5 2.5 5 30  10/3 2.2 At goal, no change 5 2.5 5 5 5 2.5 5 30  8/22 2.4 At goal, no change 5 2.5 5 5 5 2.5 5 30  7/18 2.3  At goal, no change 5 2.5 5 5 5 2.5 5 30  6/14 2.3 At goal, no change 5 2.5 5 5 5 2.5 5 30  5/7 3.1 Above goal, continue 5 2.5 5 5 5 2.5 5 30   4/1 1.9 Below goal, continue 5 2.5 5 5 5 2.5 5 30   3/1 2.7 At goal, no change 5 2.5 5 5 5 2.5 5 30  2/4 2.2 At goal, no change 5 2.5 5 5 5 2.5 5 30  12/17 2.2 At goal, no change 5 2.5 5 5 5 2.5 5 30  10/31 2.1 At goal, no change 5 2.5 5 5 5 2.5 5 30  9/27 2.5 At goal, no change 5 2.5 5 5 5 2.5 5 30  8/30 2.0 At goal, no change 5 2.5 5 5 5 2.5 5 30  8/1 1.8 Below goal, continue 5 2.5 5 5 5 2.5 5 30  6/27 2.3 At goal, no change 5 2.5 5 5 5 2.5 5 30  6/1 2.5 At goal, no change 5 2.5 5 5 5 2.5 5 30  5/2 3.1 Above goal, continue 5 2.5 5 5 5 2.5 5 30  4/4 2.2 At goal, no change 5 2.5 5 5 5 2.5 5 30  3/14 1.9 Below goal, continue 5 2.5 5 5 5 2.5 5 30  2/9 2.2 At goal, no change 5 2.5 5 5 5 2.5 5 30  1/11 2.0 At goal, no change 5 2.5 5 5 5 2.5 5 30    Patient History:   Recent hospitalizations/HC visits 2/5 Dr. Charline Retana: no med changes   Recent medication changes None reported   Medications taken regularly that may interact with warfarin or alter INR No significant drug interactions identified   Warfarin dose taken as prescribed Yes   Signs/symptoms of bleeding Hx of subdural hematoma   Vitamin K intake -Normally has 2/3 can of spinach per week, 1-2 shantanu salads per week and 2-3 broccoli per week.   -5/7/21 switched to 1/4 can of spinach on MWF  -6/28/21 decreased to 1/4 can spinach once per week since last visit  -7/26/21 2/3 can spinach per week, but this is starting to bother his stomach and he may try to decrease a little.   -1/5/22 patient no longer eating spinach, eats broccoli 3-4 times per week    Recent vomiting/diarrhea/fever, changes in weight or activity level None reported   Tobacco or alcohol use Denies use of either   Upcoming surgeries or procedures None reported Assessment/Plan:   Patient's INR was therapeutic today (2.3). Patient denies any warfarin dosing errors, diet changes, medication changes, illness, or bleeding. Patient prefers to adjust vitamin K intake instead of warfarin dose if possible when INR is out of range. Patient was instructed to continue stable warfarin regimen of 2.5 mg on Mon+Fri, and 5 mg on all other days. Repeat INR in 6 weeks (INR stable >6 months). Patient was reminded to maintain consistent vitamin K intake and call with any bleeding, medication changes, or fever/vomiting/diarrhea. Patient understands dosing directions and information discussed. Dosing schedule and follow up appointment given to patient. Progress note routed to referring physician's office. Patient acknowledges working in consult agreement with pharmacist as referred by his/her physician. Next Clinic Appointment:      Please call [facility] at [phone] with any questions. Thanks! Maciel Hoff.  Trever AlejandraD, BCPS  [facility]  Ph: [phone]  3/16/2022 11:48 AM     For Pharmacy Admin Tracking Only     Intervention Detail: Adherence Monitorin   Total # of Interventions Recommended: 0   Total # of Interventions Accepted: 0   Time Spent (min): 15

## 2022-03-16 NOTE — TELEPHONE ENCOUNTER
Spoke with pt. He has not used the new unit so there is nothing to access. Pt to turn unit on and will recheck for data in abut 1 hour.

## 2022-04-14 RX ORDER — WARFARIN SODIUM 5 MG/1
TABLET ORAL
Qty: 90 TABLET | Refills: 3 | Status: SHIPPED | OUTPATIENT
Start: 2022-04-14 | End: 2022-07-28 | Stop reason: ALTCHOICE

## 2022-04-22 ENCOUNTER — TELEPHONE (OUTPATIENT)
Dept: PHARMACY | Age: 72
End: 2022-04-22

## 2022-04-22 DIAGNOSIS — I48.20 CHRONIC ATRIAL FIBRILLATION (HCC): Primary | ICD-10-CM

## 2022-04-22 NOTE — TELEPHONE ENCOUNTER
Dr. Ole Pacheco,    The patient's warfarin therapy is currently being managed by [address] [place] [address]. The referral on file is going to  soon. Please sign pended referral for patient to continue care with the anticoagulation clinic.      Thanks,   Nino Flores, PharmD, [facility]  [facility] [address]: [phone]  [place]: [phone]  2022 2:47 PM

## 2022-04-27 ENCOUNTER — ANTI-COAG VISIT (OUTPATIENT)
Dept: PHARMACY | Age: 72
End: 2022-04-27
Payer: MEDICARE

## 2022-04-27 DIAGNOSIS — I48.91 ATRIAL FIBRILLATION, UNSPECIFIED TYPE (HCC): ICD-10-CM

## 2022-04-27 DIAGNOSIS — Z79.01 LONG TERM CURRENT USE OF ANTICOAGULANT THERAPY: Primary | ICD-10-CM

## 2022-04-27 LAB — INTERNATIONAL NORMALIZATION RATIO, POC: 2.4

## 2022-04-27 PROCEDURE — 99211 OFF/OP EST MAY X REQ PHY/QHP: CPT | Performed by: PHARMACIST

## 2022-04-27 PROCEDURE — 85610 PROTHROMBIN TIME: CPT | Performed by: PHARMACIST

## 2022-04-27 NOTE — PROGRESS NOTES
ANTICOAGULATION SERVICE    Dedra Kathleen is a 66 y.o. male with PMHx significant for A-fib, DVT, internal hemorrhoids, subdural hematoma, lymphoma who presents to clinic 4/27/2022 for anticoagulation monitoring and adjustment.     Anticoagulation Indication(s):  Afib + h/o DVT  Referring Physician:  Dr. Maria Isabel Patterson  Goal INR Range:  2-3  Duration of Anticoagulation Therapy:  Indefinite  Time of day dose taken:  AM  Product patient has at home:  warfarin 5 mg    INR Summary                           Warfarin regimen (mg)  Date INR   A/P   Sun Mon Tue Wed Thu Fri Sat Mg/wk  4/27 2.4 At goal, no change 5 2.5 5 5 5 2.5 5 30  3/16 2.3 At goal, no change 5 2.5 5 5 5 2.5 5 30  2/9 2.3 At goal, no change 5 2.5 5 5 5 2.5 5 30  1/5 2.6 At goal, no change 5 2.5 5 5 5 2.5 5 30  12/8 2.6 At goal, no change 5 2.5 5 5 5 2.5 5 30  11/10 2.5 At goal, no change 5 2.5 5 5 5 2.5 5 30  10/14 2.1 At goal, no change 5 2.5 5 5 5 2.5 5 30  9/20 2.1 At goal, no change 5 2.5 5 5 5 2.5 5 30  8/23 2.3 At goal, no change 5 2.5 5 5 5 2.5 5 30  7/26 2.9 At goal, no change 5 2.5 5 5 5 2.5 5 30  6/28 2.8 At goal, no change 5 2.5 5 5 5 2.5 5 30  5/26 2.2 At goal, no change 5 2.5 5 5 5 2.5 5 30  5/7 3.4 Above goal, hold x 1 5 2.5 5 5 5 0/2.5 5 30  4/9 2.4 At goal, no change 5 2.5 5 5 5 2.5 5 30  2/24 2.1 At goal, no change 5 2.5 5 5 5 2.5 5 30  1/15 2.3 At goal, no change 5 2.5 5 5 5 2.5 5 30  12/17 2.5 At goal, no change 5 2.5 5 5 5 2.5 5 30  11/30 3.8 Above goal, hold x 1 5 0/2.5 5 5 5 2.5 5 30  11/2 2.5 At goal, no change 5 2.5 5 5 5 2.5 5 30  10/2 2.2 At goal, no change 5 2.5 5 5 5 2.5 5 30  8/20 2.5 At goal, no change 5 2.5 5 5 5 2.5 5 30  7/23 2.9 At goal, no change 5 2.5 5 5 5 2.5 5 30  6/4 2.4 At goal, no change 5 2.5 5 5 5 2.5 5 30  4/23 2.7 At goal, no change 5 2.5 5 5 5 2.5 5 30  2/20 2.9 At goal, no change 5 2.5 5 5 5 2.5 5 30  1/22 1.8 Below goal, continue 5 2.5 5 5 5 2.5 5 30  12/5 2.5 At goal, no change 5 2.5 5 5 5 2.5 5 30  11/7 2.2 At goal, no change 5 2.5 5 5 5 2.5 5 30  10/3 2.2 At goal, no change 5 2.5 5 5 5 2.5 5 30  8/22 2.4 At goal, no change 5 2.5 5 5 5 2.5 5 30  7/18 2.3  At goal, no change 5 2.5 5 5 5 2.5 5 30  6/14 2.3 At goal, no change 5 2.5 5 5 5 2.5 5 30  5/7 3.1 Above goal, continue 5 2.5 5 5 5 2.5 5 30   4/1 1.9 Below goal, continue 5 2.5 5 5 5 2.5 5 30   3/1 2.7 At goal, no change 5 2.5 5 5 5 2.5 5 30  2/4 2.2 At goal, no change 5 2.5 5 5 5 2.5 5 30  12/17 2.2 At goal, no change 5 2.5 5 5 5 2.5 5 30  10/31 2.1 At goal, no change 5 2.5 5 5 5 2.5 5 30  9/27 2.5 At goal, no change 5 2.5 5 5 5 2.5 5 30  8/30 2.0 At goal, no change 5 2.5 5 5 5 2.5 5 30  8/1 1.8 Below goal, continue 5 2.5 5 5 5 2.5 5 30  6/27 2.3 At goal, no change 5 2.5 5 5 5 2.5 5 30  6/1 2.5 At goal, no change 5 2.5 5 5 5 2.5 5 30  5/2 3.1 Above goal, continue 5 2.5 5 5 5 2.5 5 30  4/4 2.2 At goal, no change 5 2.5 5 5 5 2.5 5 30  3/14 1.9 Below goal, continue 5 2.5 5 5 5 2.5 5 30  2/9 2.2 At goal, no change 5 2.5 5 5 5 2.5 5 30  1/11 2.0 At goal, no change 5 2.5 5 5 5 2.5 5 30    Patient History:   Recent hospitalizations/HC visits 2/5 Dr. Varghese [place]: no med changes   Recent medication changes None reported   Medications taken regularly that may interact with warfarin or alter INR No significant drug interactions identified   Warfarin dose taken as prescribed Yes   Signs/symptoms of bleeding Hx of subdural hematoma   Vitamin K intake -Normally has 2/3 can of spinach per week, 1-2 shantanu salads per week and 2-3 broccoli per week.   -5/7/21 switched to 1/4 can of spinach on MWF  -6/28/21 decreased to 1/4 can spinach once per week since last visit  -7/26/21 2/3 can spinach per week, but this is starting to bother his stomach and he may try to decrease a little.   -1/5/22 patient no longer eating spinach, eats broccoli 3-4 times per week    Recent vomiting/diarrhea/fever, changes in weight or activity level None reported   Tobacco or alcohol use Denies use of either Upcoming surgeries or procedures None reported     Assessment/Plan:   Patient's INR was therapeutic today (2.4). Patient denies any warfarin dosing errors, diet changes, medication changes, illness, or bleeding. His wife was recently diagnosed with breast cancer, so will coordinate INR draws around her appointments. Patient prefers to adjust vitamin K intake instead of warfarin dose if possible when INR is out of range. Patient was instructed to continue stable warfarin regimen of 2.5 mg on Mon+Fri, and 5 mg on all other days. Repeat INR in 6 weeks (INR stable >6 months). Patient was reminded to maintain consistent vitamin K intake and call with any bleeding, medication changes, or fever/vomiting/diarrhea. Patient understands dosing directions and information discussed. Dosing schedule and follow up appointment given to patient. Progress note routed to referring physician's office. Patient acknowledges working in consult agreement with pharmacist as referred by his/her physician. Next Clinic Appointment:  6/8    Please call [facility] at [phone] with any questions. Thanks!   Liberty Taylor, PharmD, BCPS  [facility]  Diane: [phone]  Caitie: [phone]  4/27/2022 11:51 AM    For Pharmacy Admin Tracking Only     Total # of Interventions Recommended: 0   Total # of Interventions Accepted: 0   Time Spent (min): 15

## 2022-05-05 ENCOUNTER — TELEPHONE (OUTPATIENT)
Dept: CARDIOLOGY CLINIC | Age: 72
End: 2022-05-05

## 2022-05-05 NOTE — TELEPHONE ENCOUNTER
Spoke w/pt at length; states was at oncology office only for PICC line flush (undergoing CA Tx), also states wife recently diagnosed w/Breast CA and is undergoing aggressive chemotherapy tx and has had a lot of stress in life past few weeks. Advised to ck BP at home 1 x /day at approx same time , keep a written log and call in ~ 1 week with readings. Advised to take medications (ie sotalol ) at approx same time as ordered. Will ck BP ~ 2 hours after am dosing. Denies any CP, cough; no fever, no SOB.

## 2022-06-01 ENCOUNTER — ANTI-COAG VISIT (OUTPATIENT)
Dept: PHARMACY | Age: 72
End: 2022-06-01
Payer: MEDICARE

## 2022-06-01 DIAGNOSIS — I48.91 ATRIAL FIBRILLATION, UNSPECIFIED TYPE (HCC): ICD-10-CM

## 2022-06-01 DIAGNOSIS — Z79.01 LONG TERM CURRENT USE OF ANTICOAGULANT THERAPY: Primary | ICD-10-CM

## 2022-06-01 LAB — INTERNATIONAL NORMALIZATION RATIO, POC: 2.1

## 2022-06-01 PROCEDURE — 85610 PROTHROMBIN TIME: CPT | Performed by: PHARMACIST

## 2022-06-01 PROCEDURE — 99211 OFF/OP EST MAY X REQ PHY/QHP: CPT | Performed by: PHARMACIST

## 2022-06-01 RX ORDER — AMLODIPINE BESYLATE 5 MG/1
5 TABLET ORAL DAILY
COMMUNITY
End: 2022-06-01 | Stop reason: DRUGHIGH

## 2022-06-01 RX ORDER — AMLODIPINE BESYLATE 5 MG/1
5 TABLET ORAL DAILY
Qty: 30 TABLET | Refills: 3 | Status: SHIPPED | OUTPATIENT
Start: 2022-06-01 | End: 2022-08-29

## 2022-06-01 RX ORDER — AMLODIPINE BESYLATE 5 MG/1
5 TABLET ORAL DAILY
Qty: 30 TABLET | Refills: 3 | Status: SHIPPED | COMMUNITY
Start: 2022-06-01 | End: 2022-06-01 | Stop reason: SDUPTHER

## 2022-06-01 NOTE — TELEPHONE ENCOUNTER
Pt walked in stating he caron received his Amlodipine. Elsa Shannon ordered it on 5/16/2022 . Rx called in.

## 2022-06-01 NOTE — PROGRESS NOTES
ANTICOAGULATION SERVICE    Yadira Wells is a 66 y.o. male with PMHx significant for A-fib, DVT, internal hemorrhoids, subdural hematoma, lymphoma who presents to clinic 6/1/2022 for anticoagulation monitoring and adjustment.     Anticoagulation Indication(s):  Afib + h/o DVT  Referring Physician:  Dr. Hattie Brandon  Goal INR Range:  2-3  Duration of Anticoagulation Therapy:  Indefinite  Time of day dose taken:  AM  Product patient has at home:  warfarin 5 mg    INR Summary                           Warfarin regimen (mg)  Date INR   A/P   Sun Mon Tue Wed Thu Fri Sat Mg/wk  6/1 2.1 At goal, no change 5 2.5 5 5 5 2.5 5 30  4/27 2.4 At goal, no change 5 2.5 5 5 5 2.5 5 30  3/16 2.3 At goal, no change 5 2.5 5 5 5 2.5 5 30  2/9 2.3 At goal, no change 5 2.5 5 5 5 2.5 5 30  1/5 2.6 At goal, no change 5 2.5 5 5 5 2.5 5 30  12/8 2.6 At goal, no change 5 2.5 5 5 5 2.5 5 30  11/10 2.5 At goal, no change 5 2.5 5 5 5 2.5 5 30  10/14 2.1 At goal, no change 5 2.5 5 5 5 2.5 5 30  9/20 2.1 At goal, no change 5 2.5 5 5 5 2.5 5 30  8/23 2.3 At goal, no change 5 2.5 5 5 5 2.5 5 30  7/26 2.9 At goal, no change 5 2.5 5 5 5 2.5 5 30  6/28 2.8 At goal, no change 5 2.5 5 5 5 2.5 5 30  5/26 2.2 At goal, no change 5 2.5 5 5 5 2.5 5 30  5/7 3.4 Above goal, hold x 1 5 2.5 5 5 5 0/2.5 5 30  4/9 2.4 At goal, no change 5 2.5 5 5 5 2.5 5 30  2/24 2.1 At goal, no change 5 2.5 5 5 5 2.5 5 30  1/15 2.3 At goal, no change 5 2.5 5 5 5 2.5 5 30  12/17 2.5 At goal, no change 5 2.5 5 5 5 2.5 5 30  11/30 3.8 Above goal, hold x 1 5 0/2.5 5 5 5 2.5 5 30  11/2 2.5 At goal, no change 5 2.5 5 5 5 2.5 5 30  10/2 2.2 At goal, no change 5 2.5 5 5 5 2.5 5 30  8/20 2.5 At goal, no change 5 2.5 5 5 5 2.5 5 30  7/23 2.9 At goal, no change 5 2.5 5 5 5 2.5 5 30  6/4 2.4 At goal, no change 5 2.5 5 5 5 2.5 5 30  4/23 2.7 At goal, no change 5 2.5 5 5 5 2.5 5 30  2/20 2.9 At goal, no change 5 2.5 5 5 5 2.5 5 30  1/22 1.8 Below goal, continue 5 2.5 5 5 5 2.5 5 30  12/5 2.5 At goal, no change 5 2.5 5 5 5 2.5 5 30  11/7 2.2 At goal, no change 5 2.5 5 5 5 2.5 5 30  10/3 2.2 At goal, no change 5 2.5 5 5 5 2.5 5 30  8/22 2.4 At goal, no change 5 2.5 5 5 5 2.5 5 30  7/18 2.3  At goal, no change 5 2.5 5 5 5 2.5 5 30  6/14 2.3 At goal, no change 5 2.5 5 5 5 2.5 5 30  5/7 3.1 Above goal, continue 5 2.5 5 5 5 2.5 5 30   4/1 1.9 Below goal, continue 5 2.5 5 5 5 2.5 5 30   3/1 2.7 At goal, no change 5 2.5 5 5 5 2.5 5 30  2/4 2.2 At goal, no change 5 2.5 5 5 5 2.5 5 30  12/17 2.2 At goal, no change 5 2.5 5 5 5 2.5 5 30  10/31 2.1 At goal, no change 5 2.5 5 5 5 2.5 5 30  9/27 2.5 At goal, no change 5 2.5 5 5 5 2.5 5 30  8/30 2.0 At goal, no change 5 2.5 5 5 5 2.5 5 30  8/1 1.8 Below goal, continue 5 2.5 5 5 5 2.5 5 30  6/27 2.3 At goal, no change 5 2.5 5 5 5 2.5 5 30  6/1 2.5 At goal, no change 5 2.5 5 5 5 2.5 5 30  5/2 3.1 Above goal, continue 5 2.5 5 5 5 2.5 5 30  4/4 2.2 At goal, no change 5 2.5 5 5 5 2.5 5 30  3/14 1.9 Below goal, continue 5 2.5 5 5 5 2.5 5 30  2/9 2.2 At goal, no change 5 2.5 5 5 5 2.5 5 30  1/11 2.0 At goal, no change 5 2.5 5 5 5 2.5 5 30    Patient History:   Recent hospitalizations/HC visits 2/5 Dr. Lorie Guillaume: no med changes   Recent medication changes None reported   Medications taken regularly that may interact with warfarin or alter INR No significant drug interactions identified   Warfarin dose taken as prescribed Yes   Signs/symptoms of bleeding Hx of subdural hematoma   Vitamin K intake -Normally has 2/3 can of spinach per week, 1-2 shantanu salads per week and 2-3 broccoli per week.   -5/7/21 switched to 1/4 can of spinach on MWF  -6/28/21 decreased to 1/4 can spinach once per week since last visit  -7/26/21 2/3 can spinach per week, but this is starting to bother his stomach and he may try to decrease a little.   -1/5/22 patient no longer eating spinach, eats broccoli 3-4 times per week    Recent vomiting/diarrhea/fever, changes in weight or activity level None reported   Tobacco or alcohol use Denies use of either   Upcoming surgeries or procedures None reported     Assessment/Plan:   Patient's INR was therapeutic today (2.1). Patient denies any warfarin dosing errors, diet changes, medication changes, illness, or bleeding. His wife was recently diagnosed with breast cancer, so will coordinate INR draws around her appointments. Patient prefers to adjust vitamin K intake instead of warfarin dose if possible when INR is out of range. Patient was instructed to continue stable warfarin regimen of 2.5 mg on Mon+Fri, and 5 mg on all other days. Repeat INR in 6 weeks (INR stable >6 months). Patient was reminded to maintain consistent vitamin K intake and call with any bleeding, medication changes, or fever/vomiting/diarrhea. Patient understands dosing directions and information discussed. Dosing schedule and follow up appointment given to patient. Progress note routed to referring physician's office. Patient acknowledges working in consult agreement with pharmacist as referred by his/her physician. Next Clinic Appointment:  7/13    Please call [facility] at [phone] with any questions. Thanks!   Zhou Arredondo, PharmD, BCPS  [facility]  Diane: [phone]  Caitie: [phone]  6/1/2022 11:04 AM    For Pharmacy Admin Tracking Only     Total # of Interventions Recommended: 0   Total # of Interventions Accepted: 0   Time Spent (min): 15

## 2022-07-13 ENCOUNTER — ANTI-COAG VISIT (OUTPATIENT)
Dept: PHARMACY | Age: 72
End: 2022-07-13
Payer: MEDICARE

## 2022-07-13 DIAGNOSIS — Z79.01 LONG TERM CURRENT USE OF ANTICOAGULANT THERAPY: Primary | ICD-10-CM

## 2022-07-13 LAB — INTERNATIONAL NORMALIZATION RATIO, POC: 2.7

## 2022-07-13 PROCEDURE — 99211 OFF/OP EST MAY X REQ PHY/QHP: CPT

## 2022-07-13 PROCEDURE — 85610 PROTHROMBIN TIME: CPT

## 2022-07-13 NOTE — PROGRESS NOTES
ANTICOAGULATION SERVICE    Pavan Bar is a 67 y.o. male with PMHx significant for A-fib, DVT, internal hemorrhoids, subdural hematoma, lymphoma who presents to clinic 7/13/2022 for anticoagulation monitoring and adjustment.     Anticoagulation Indication(s):  Afib + h/o DVT  Referring Physician:  Dr. Hurtado First  Goal INR Range:  2-3  Duration of Anticoagulation Therapy:  Indefinite  Time of day dose taken:  AM  Product patient has at home:  warfarin 5 mg    INR Summary                           Warfarin regimen (mg)  Date INR   A/P   Sun Mon Tue Wed Thu Fri Sat Mg/wk  7/13 2.7 At goal, no change 5 2.5 5 5 5 2.5 5 30  6/1 2.1 At goal, no change 5 2.5 5 5 5 2.5 5 30  4/27 2.4 At goal, no change 5 2.5 5 5 5 2.5 5 30  3/16 2.3 At goal, no change 5 2.5 5 5 5 2.5 5 30  2/9 2.3 At goal, no change 5 2.5 5 5 5 2.5 5 30  1/5 2.6 At goal, no change 5 2.5 5 5 5 2.5 5 30  12/8 2.6 At goal, no change 5 2.5 5 5 5 2.5 5 30  11/10 2.5 At goal, no change 5 2.5 5 5 5 2.5 5 30  10/14 2.1 At goal, no change 5 2.5 5 5 5 2.5 5 30  9/20 2.1 At goal, no change 5 2.5 5 5 5 2.5 5 30  8/23 2.3 At goal, no change 5 2.5 5 5 5 2.5 5 30  7/26 2.9 At goal, no change 5 2.5 5 5 5 2.5 5 30  6/28 2.8 At goal, no change 5 2.5 5 5 5 2.5 5 30  5/26 2.2 At goal, no change 5 2.5 5 5 5 2.5 5 30  5/7 3.4 Above goal, hold x 1 5 2.5 5 5 5 0/2.5 5 30  4/9 2.4 At goal, no change 5 2.5 5 5 5 2.5 5 30  2/24 2.1 At goal, no change 5 2.5 5 5 5 2.5 5 30  1/15 2.3 At goal, no change 5 2.5 5 5 5 2.5 5 30  12/17 2.5 At goal, no change 5 2.5 5 5 5 2.5 5 30  11/30 3.8 Above goal, hold x 1 5 0/2.5 5 5 5 2.5 5 30  11/2 2.5 At goal, no change 5 2.5 5 5 5 2.5 5 30  10/2 2.2 At goal, no change 5 2.5 5 5 5 2.5 5 30  8/20 2.5 At goal, no change 5 2.5 5 5 5 2.5 5 30  7/23 2.9 At goal, no change 5 2.5 5 5 5 2.5 5 30  6/4 2.4 At goal, no change 5 2.5 5 5 5 2.5 5 30  4/23 2.7 At goal, no change 5 2.5 5 5 5 2.5 5 30  2/20 2.9 At goal, no change 5 2.5 5 5 5 2.5 5 30  1/22 1.8 Below goal, continue 5 2.5 5 5 5 2.5 5 30  12/5 2.5 At goal, no change 5 2.5 5 5 5 2.5 5 30  11/7 2.2 At goal, no change 5 2.5 5 5 5 2.5 5 30  10/3 2.2 At goal, no change 5 2.5 5 5 5 2.5 5 30  8/22 2.4 At goal, no change 5 2.5 5 5 5 2.5 5 30  7/18 2.3  At goal, no change 5 2.5 5 5 5 2.5 5 30  6/14 2.3 At goal, no change 5 2.5 5 5 5 2.5 5 30  5/7 3.1 Above goal, continue 5 2.5 5 5 5 2.5 5 30   4/1 1.9 Below goal, continue 5 2.5 5 5 5 2.5 5 30   3/1 2.7 At goal, no change 5 2.5 5 5 5 2.5 5 30  2/4 2.2 At goal, no change 5 2.5 5 5 5 2.5 5 30  12/17 2.2 At goal, no change 5 2.5 5 5 5 2.5 5 30  10/31 2.1 At goal, no change 5 2.5 5 5 5 2.5 5 30  9/27 2.5 At goal, no change 5 2.5 5 5 5 2.5 5 30  8/30 2.0 At goal, no change 5 2.5 5 5 5 2.5 5 30  8/1 1.8 Below goal, continue 5 2.5 5 5 5 2.5 5 30  6/27 2.3 At goal, no change 5 2.5 5 5 5 2.5 5 30  6/1 2.5 At goal, no change 5 2.5 5 5 5 2.5 5 30  5/2 3.1 Above goal, continue 5 2.5 5 5 5 2.5 5 30  4/4 2.2 At goal, no change 5 2.5 5 5 5 2.5 5 30  3/14 1.9 Below goal, continue 5 2.5 5 5 5 2.5 5 30  2/9 2.2 At goal, no change 5 2.5 5 5 5 2.5 5 30  1/11 2.0 At goal, no change 5 2.5 5 5 5 2.5 5 30    Patient History:   Recent hospitalizations/HC visits None reported   Recent medication changes Started amlodipine   Medications taken regularly that may interact with warfarin or alter INR No significant drug interactions identified   Warfarin dose taken as prescribed Yes   Signs/symptoms of bleeding Hx of subdural hematoma   Vitamin K intake -Normally has 2/3 can of spinach per week, 1-2 shantanu salads per week and 2-3 broccoli per week.   -5/7/21 switched to 1/4 can of spinach on MWF  -6/28/21 decreased to 1/4 can spinach once per week since last visit  -7/26/21 2/3 can spinach per week, but this is starting to bother his stomach and he may try to decrease a little.   -1/5/22 patient no longer eating spinach, eats broccoli 3-4 times per week    Recent vomiting/diarrhea/fever, changes in weight or activity level None reported   Tobacco or alcohol use Denies use of either   Upcoming surgeries or procedures None reported     Assessment/Plan:   Patient's INR was therapeutic today (2.7). Patient denies any warfarin dosing errors, diet changes, medication changes, illness, or bleeding. His wife was recently diagnosed with breast cancer, so will coordinate INR draws around her appointments. Patient prefers to adjust vitamin K intake instead of warfarin dose if possible when INR is out of range. Patient was instructed to continue stable warfarin regimen of 2.5 mg on Mon+Fri, and 5 mg on all other days. Repeat INR in 4 weeks (INR stable >6 months). Patient was reminded to maintain consistent vitamin K intake and call with any bleeding, medication changes, or fever/vomiting/diarrhea. Patient understands dosing directions and information discussed. Dosing schedule and follow up appointment given to patient. Progress note routed to referring physician's office. Patient acknowledges working in consult agreement with pharmacist as referred by his/her physician. Next Clinic Appointment:  8/10    Please call [place] [address] [facility] at [phone] with any questions. Thanks! Michiel Rubinstein.  Pako AlejandraD, BCPS  [place] [address] [facility]  Ph: [phone]  2022 11:49 AM     For Pharmacy Admin Tracking Only     Intervention Detail: Adherence Monitorin   Total # of Interventions Recommended: 0   Total # of Interventions Accepted: 0   Time Spent (min): 15

## 2022-07-22 ENCOUNTER — HOSPITAL ENCOUNTER (INPATIENT)
Age: 72
LOS: 2 days | Discharge: HOME | DRG: 378 | End: 2022-07-24
Attending: STUDENT IN AN ORGANIZED HEALTH CARE EDUCATION/TRAINING PROGRAM | Admitting: INTERNAL MEDICINE
Payer: MEDICARE

## 2022-07-22 ENCOUNTER — TELEPHONE (OUTPATIENT)
Dept: CARDIOLOGY CLINIC | Age: 72
End: 2022-07-22

## 2022-07-22 DIAGNOSIS — I48.20 CHRONIC ATRIAL FIBRILLATION (HCC): Primary | ICD-10-CM

## 2022-07-22 DIAGNOSIS — K92.1 MELENA: Primary | ICD-10-CM

## 2022-07-22 DIAGNOSIS — K92.1 BLACK TARRY STOOLS: ICD-10-CM

## 2022-07-22 DIAGNOSIS — R79.1 ELEVATED INR: ICD-10-CM

## 2022-07-22 PROBLEM — K92.2 UPPER GI BLEED: Status: ACTIVE | Noted: 2022-07-22

## 2022-07-22 LAB
ABO/RH: NORMAL
ANION GAP SERPL CALCULATED.3IONS-SCNC: 13 MMOL/L (ref 3–16)
ANTIBODY SCREEN: NORMAL
BASOPHILS ABSOLUTE: 0.1 K/UL (ref 0–0.2)
BASOPHILS RELATIVE PERCENT: 0.5 %
BUN BLDV-MCNC: 60 MG/DL (ref 7–20)
CALCIUM SERPL-MCNC: 9.4 MG/DL (ref 8.3–10.6)
CHLORIDE BLD-SCNC: 109 MMOL/L (ref 99–110)
CO2: 19 MMOL/L (ref 21–32)
CREAT SERPL-MCNC: 1.3 MG/DL (ref 0.8–1.3)
EKG DIAGNOSIS: NORMAL
EKG Q-T INTERVAL: 382 MS
EKG QRS DURATION: 82 MS
EKG QTC CALCULATION (BAZETT): 467 MS
EKG R AXIS: 35 DEGREES
EKG T AXIS: 74 DEGREES
EKG VENTRICULAR RATE: 90 BPM
EOSINOPHILS ABSOLUTE: 0 K/UL (ref 0–0.6)
EOSINOPHILS RELATIVE PERCENT: 0 %
GFR AFRICAN AMERICAN: >60
GFR NON-AFRICAN AMERICAN: 54
GLUCOSE BLD-MCNC: 175 MG/DL (ref 70–99)
HCT VFR BLD CALC: 32.6 % (ref 40.5–52.5)
HEMOGLOBIN: 10.7 G/DL (ref 13.5–17.5)
INR BLD: 3.36 (ref 0.87–1.14)
LYMPHOCYTES ABSOLUTE: 1.8 K/UL (ref 1–5.1)
LYMPHOCYTES RELATIVE PERCENT: 10.5 %
MCH RBC QN AUTO: 31 PG (ref 26–34)
MCHC RBC AUTO-ENTMCNC: 32.7 G/DL (ref 31–36)
MCV RBC AUTO: 94.9 FL (ref 80–100)
MONOCYTES ABSOLUTE: 0.9 K/UL (ref 0–1.3)
MONOCYTES RELATIVE PERCENT: 5.3 %
NEUTROPHILS ABSOLUTE: 13.9 K/UL (ref 1.7–7.7)
NEUTROPHILS RELATIVE PERCENT: 83.7 %
PDW BLD-RTO: 14.4 % (ref 12.4–15.4)
PLATELET # BLD: 224 K/UL (ref 135–450)
PMV BLD AUTO: 8.6 FL (ref 5–10.5)
POTASSIUM REFLEX MAGNESIUM: 4.5 MMOL/L (ref 3.5–5.1)
PROTHROMBIN TIME: 34.3 SEC (ref 11.7–14.5)
RBC # BLD: 3.44 M/UL (ref 4.2–5.9)
SODIUM BLD-SCNC: 141 MMOL/L (ref 136–145)
WBC # BLD: 16.6 K/UL (ref 4–11)

## 2022-07-22 PROCEDURE — 93005 ELECTROCARDIOGRAM TRACING: CPT | Performed by: STUDENT IN AN ORGANIZED HEALTH CARE EDUCATION/TRAINING PROGRAM

## 2022-07-22 PROCEDURE — 82270 OCCULT BLOOD FECES: CPT

## 2022-07-22 PROCEDURE — 86900 BLOOD TYPING SEROLOGIC ABO: CPT

## 2022-07-22 PROCEDURE — 2580000003 HC RX 258: Performed by: STUDENT IN AN ORGANIZED HEALTH CARE EDUCATION/TRAINING PROGRAM

## 2022-07-22 PROCEDURE — 96374 THER/PROPH/DIAG INJ IV PUSH: CPT

## 2022-07-22 PROCEDURE — 85025 COMPLETE CBC W/AUTO DIFF WBC: CPT

## 2022-07-22 PROCEDURE — 86901 BLOOD TYPING SEROLOGIC RH(D): CPT

## 2022-07-22 PROCEDURE — 80048 BASIC METABOLIC PNL TOTAL CA: CPT

## 2022-07-22 PROCEDURE — 86850 RBC ANTIBODY SCREEN: CPT

## 2022-07-22 PROCEDURE — C9113 INJ PANTOPRAZOLE SODIUM, VIA: HCPCS | Performed by: STUDENT IN AN ORGANIZED HEALTH CARE EDUCATION/TRAINING PROGRAM

## 2022-07-22 PROCEDURE — 6360000002 HC RX W HCPCS: Performed by: STUDENT IN AN ORGANIZED HEALTH CARE EDUCATION/TRAINING PROGRAM

## 2022-07-22 PROCEDURE — 85610 PROTHROMBIN TIME: CPT

## 2022-07-22 PROCEDURE — 1200000000 HC SEMI PRIVATE

## 2022-07-22 PROCEDURE — 99285 EMERGENCY DEPT VISIT HI MDM: CPT

## 2022-07-22 PROCEDURE — 6370000000 HC RX 637 (ALT 250 FOR IP): Performed by: STUDENT IN AN ORGANIZED HEALTH CARE EDUCATION/TRAINING PROGRAM

## 2022-07-22 RX ORDER — ACETAMINOPHEN 325 MG/1
650 TABLET ORAL EVERY 6 HOURS PRN
Status: DISCONTINUED | OUTPATIENT
Start: 2022-07-22 | End: 2022-07-24 | Stop reason: HOSPADM

## 2022-07-22 RX ORDER — PANTOPRAZOLE SODIUM 40 MG/10ML
40 INJECTION, POWDER, LYOPHILIZED, FOR SOLUTION INTRAVENOUS DAILY
Status: DISCONTINUED | OUTPATIENT
Start: 2022-07-22 | End: 2022-07-22 | Stop reason: ALTCHOICE

## 2022-07-22 RX ORDER — SODIUM CHLORIDE, SODIUM LACTATE, POTASSIUM CHLORIDE, CALCIUM CHLORIDE 600; 310; 30; 20 MG/100ML; MG/100ML; MG/100ML; MG/100ML
INJECTION, SOLUTION INTRAVENOUS CONTINUOUS
Status: DISCONTINUED | OUTPATIENT
Start: 2022-07-22 | End: 2022-07-22

## 2022-07-22 RX ORDER — SOTALOL HYDROCHLORIDE 80 MG/1
40 TABLET ORAL 2 TIMES DAILY
COMMUNITY

## 2022-07-22 RX ORDER — ACETAMINOPHEN 650 MG/1
650 SUPPOSITORY RECTAL EVERY 6 HOURS PRN
Status: DISCONTINUED | OUTPATIENT
Start: 2022-07-22 | End: 2022-07-24 | Stop reason: HOSPADM

## 2022-07-22 RX ORDER — SOTALOL HYDROCHLORIDE 80 MG/1
40 TABLET ORAL 2 TIMES DAILY
Status: DISCONTINUED | OUTPATIENT
Start: 2022-07-22 | End: 2022-07-24 | Stop reason: HOSPADM

## 2022-07-22 RX ORDER — SODIUM CHLORIDE 9 MG/ML
INJECTION, SOLUTION INTRAVENOUS PRN
Status: DISCONTINUED | OUTPATIENT
Start: 2022-07-22 | End: 2022-07-24 | Stop reason: HOSPADM

## 2022-07-22 RX ORDER — AMLODIPINE BESYLATE 5 MG/1
5 TABLET ORAL DAILY
Status: DISCONTINUED | OUTPATIENT
Start: 2022-07-23 | End: 2022-07-24 | Stop reason: HOSPADM

## 2022-07-22 RX ORDER — SODIUM CHLORIDE 0.9 % (FLUSH) 0.9 %
5-40 SYRINGE (ML) INJECTION EVERY 12 HOURS SCHEDULED
Status: DISCONTINUED | OUTPATIENT
Start: 2022-07-22 | End: 2022-07-24 | Stop reason: HOSPADM

## 2022-07-22 RX ORDER — SODIUM CHLORIDE 0.9 % (FLUSH) 0.9 %
5-40 SYRINGE (ML) INJECTION PRN
Status: DISCONTINUED | OUTPATIENT
Start: 2022-07-22 | End: 2022-07-24 | Stop reason: HOSPADM

## 2022-07-22 RX ADMIN — SODIUM CHLORIDE, PRESERVATIVE FREE 10 ML: 5 INJECTION INTRAVENOUS at 23:04

## 2022-07-22 RX ADMIN — PANTOPRAZOLE SODIUM 40 MG: 40 INJECTION, POWDER, FOR SOLUTION INTRAVENOUS at 18:28

## 2022-07-22 RX ADMIN — SOTALOL HYDROCHLORIDE 40 MG: 80 TABLET ORAL at 23:04

## 2022-07-22 ASSESSMENT — ENCOUNTER SYMPTOMS
BLOOD IN STOOL: 1
NAUSEA: 1
TROUBLE SWALLOWING: 0
ABDOMINAL DISTENTION: 0
DIARRHEA: 1
GASTROINTESTINAL NEGATIVE: 1
EYES NEGATIVE: 1
VOMITING: 0
SHORTNESS OF BREATH: 1
ABDOMINAL PAIN: 0
ALLERGIC/IMMUNOLOGIC NEGATIVE: 1

## 2022-07-22 NOTE — ED TRIAGE NOTES
Patient presents to the ED with concerns for black tarry stools and \"blood\" that started Wednesday into Thursday. Patient is on Coumadin, states his last INR was 2.7.

## 2022-07-22 NOTE — ED PROVIDER NOTES
ED Attending Attestation Note     Date of evaluation: 7/22/2022    This patient was seen by the resident. I have seen and examined the patient, agree with the workup, evaluation, management and diagnosis. The care plan has been discussed. My assessment reveals a very pleasant 79-year-old gentleman with remote history of B cell lymphoma currently on Coumadin for atrial fibrillation with remote history of peptic ulcer disease presenting with shortness of breath, fatigue and blood in stool. Patient reports having 1 day of bright red blood per rectum after eating food at a restaurant followed by several days of black stools which is abnormal for him. He reports that he has been taking his Coumadin as directed but states that he held it the last 2 days in the setting of the GI bleeding. He reports that he is now having shortness of breath causing him to take breaks after walking several feet which is not normal for him. On exam, patient has clear lungs with soft abdomen with no rebound or tenderness. Patient declined rectal exam and had not provided a stool sample in emergency department to check for occult blood. EKG reviewed interpreted by me shows atrial fibrillation with rate of 90, narrow QRS rate of 90 with no ST elevations or depressions. ED Course as of 07/23/22 0046   Fri Jul 22, 2022   1656 Hemoglobin Quant(!): 10.7  From baseline of 18 5 years ago [ED]   Sat Jul 23, 2022   0045 INR(!): 3.36 [ED]   0045 WBC(!): 16.6 [ED]   0045 BUN,BUNPL(!): 60 [ED]   0045 Creatinine: 1.3 [ED]      ED Course User Index  [ED] aVleriy Mckeon MD      Patient was started on Protonix with concern for upper GI bleed and was pending admission to the hospital for further evaluation and management of GI bleed and amenia in setting of supratherapeutic anticoagulation for his atrial fibrillation.      Valeriy Mckeon MD  07/23/22 0166

## 2022-07-22 NOTE — TELEPHONE ENCOUNTER
Spoke with pt and gave him NP's recommendations. Pt states that he currently is not actively bleeding but his stools are still black and tarry. Told pt that if he starts actively bleeding he needs to go to the ER and get a full work up. Pt very hesitant to get labs drawn, but finally agreed to go but can't until 3pm.   Pt  is still very concerned about his shortness of breath. I explained to pt that his blood count may be low and that's what is causing his shortness of breath. He was told he may need a blood transfusion and pt immediately replied he would not get one due to the fear of getting AIDS.   Consult placed for GI consult with  Dr Minerva Hebert per pt request

## 2022-07-22 NOTE — TELEPHONE ENCOUNTER
Concerned for anemia/GI bleeding    BPs trending down, GI upset/diarrhea and red/black stools. Recommend stat CBC and sending stools for occult blood  If actively have GI bleeding then go to ER.

## 2022-07-22 NOTE — H&P
none  Patient currently lives with his wife. Family History:       Problem Relation Age of Onset    Heart Disease Mother        Review of Systems   Constitutional:  Negative for diaphoresis and unexpected weight change. HENT:  Negative for trouble swallowing. Respiratory:  Positive for shortness of breath. Cardiovascular:  Negative for chest pain. Gastrointestinal:  Positive for blood in stool, diarrhea and nausea. Negative for abdominal distention, abdominal pain and vomiting. Neurological:  Positive for light-headedness. Negative for dizziness. Light-headed upon ambulation   Psychiatric/Behavioral:  Negative for confusion. ROS: A 10 point review of systems was conducted, significant findings as noted in HPI. Physical Exam  Constitutional:       General: He is not in acute distress. Cardiovascular:      Rate and Rhythm: Normal rate. Rhythm irregular. Pulmonary:      Effort: Pulmonary effort is normal.      Breath sounds: No wheezing, rhonchi or rales. Abdominal:      General: Bowel sounds are normal. There is no distension. Palpations: Abdomen is soft. There is no mass. Tenderness: There is no abdominal tenderness. There is no guarding. Musculoskeletal:      Right lower leg: No edema. Left lower leg: No edema. Comments: RLL > LLL  RLL pigmentation   Skin:     General: Skin is warm and dry. Neurological:      Mental Status: He is alert and oriented to person, place, and time. Physical exam:       Vitals:    07/22/22 1830   BP: 130/73   Pulse: 85   Resp: 16   Temp:    SpO2: 99%       DATA:    Labs:  CBC:   Recent Labs     07/22/22  1537   WBC 16.6*   HGB 10.7*   HCT 32.6*          BMP:   Recent Labs     07/22/22  1537      K 4.5      CO2 19*   BUN 60*   CREATININE 1.3   GLUCOSE 175*     LFT's: No results for input(s): AST, ALT, ALB, BILITOT, ALKPHOS in the last 72 hours.   Troponin: No results for input(s): TROPONINI in the last 72 hours.  BNP:No results for input(s): BNP in the last 72 hours. ABGs: No results for input(s): PHART, CEJ3ISR, PO2ART in the last 72 hours. INR:   Recent Labs     07/22/22  1537   INR 3.36*       U/A:No results for input(s): NITRITE, COLORU, PHUR, LABCAST, WBCUA, RBCUA, MUCUS, TRICHOMONAS, YEAST, BACTERIA, CLARITYU, SPECGRAV, LEUKOCYTESUR, UROBILINOGEN, BILIRUBINUR, BLOODU, GLUCOSEU, AMORPHOUS in the last 72 hours. Invalid input(s): Pinkie Mcburney    No orders to display           ASSESSMENT AND PLAN:    Early Nichelle  is a 67y.o. year old male w/PMH chronic a.fibrillation (on warfarin), nonrheumatic mitral valve regurgitation, gastric ulcer (previously on ranitidine), HTN, VERA (on CPAP), BPH, internal hemmorhoids, and diffuse non-Hodgkin large cell lymphoma (2011) in remission who presented to the ED w/cc rectal bleeding. #Acute Anemia  -likely secondary to GI Bleed  Patient presents with acutely melanotic stools w/PMH gastric ulceration. Patient is additionally on warfarin w/supratherapeutic INR at 3.36 and w/Hb 17 (4/2021) --> 10.7 on presentation.  Hx internal hemorrhoids , hyperplastic polyp (colonoscopy 2006) patient is likely to have UGIB vs LGIB vs gastroenteritis     -Hb at 10.7, H&H Q8H  -patient is hemodynamically stable  -holding anticoagulation  -obtain reticulocyte count  -obtain iron studies: Ferritin, TIBC, Iron   -Daily CBC  -GI consulted  -NPO at midnight for possible EGD  -IV Protonix 40mg BID       #Chronic Atrial Fibrillation  Supratherapeutic INR at 3.36 on presentation  -anticoagulation on hold  -continue home sotalol   -patient visits cardiology outpatient     #HTN  -continue home amlodipine    #Hx diffuse non-Hodgkin large cell lymphoma, in remission   -dx: 2011, s/p R-CHOP x 6   -sees Dr. Samantha Quarles      #VERA  -on nightly CPAP    Will discuss with attending physician Dr. Lily Godoy MD    Code Status:Full code  FEN: NPO at midnight   PPX: Protonix  DISPO: Florentin Cavazos MD  7/22/2022,  6:46 PM

## 2022-07-22 NOTE — ED PROVIDER NOTES
810 Counts include 234 beds at the [facility] 71 ENCOUNTER          EM RESIDENT NOTE       Date of evaluation: 7/22/2022    Chief Complaint     Melena      of Present Illness     Aron Richter is a 67 y.o. male past medical history of paroxysmal atrial fibrillation, hypertension, history of non-Hodgkin lymphoma in remission presented to the ED because he had an episode of rectal bleeding back 2 days ago and now having black tarry stools not having diarrhea as per the patient but had a few episodes of black tarry stools and he talked to his cardiologist who had referred him to gastroenterology and had ordered a CBC for him. Patient also told me that he has been having shortness of breath with just taking 5 steps which was not an issue for him 2 days ago. Patient acknowledges having a soup and a sandwich from a daily store where he lives 2 days ago. Patient denies nausea, vomiting, diarrhea and constipation constipation. As per his previous medical records his last INR was 2.7. Review of Systems     Review of Systems   Constitutional:  Positive for activity change. HENT: Negative. Eyes: Negative. Respiratory:  Positive for shortness of breath. Cardiovascular: Negative. Gastrointestinal: Negative. Endocrine: Negative. Genitourinary: Negative. Musculoskeletal: Negative. Allergic/Immunologic: Negative. Neurological: Negative. Hematological: Negative. Psychiatric/Behavioral: Negative. Past Medical, Surgical, Family, and Social History     He has a past medical history of AF (atrial fibrillation) ([facility] [address] Aortic valve disease, and Lymphoma ([facility] [address] He has a past surgical history that includes Cystoscopy (1-11-10); Colonoscopy (8/18/2006 ); Appendectomy; lymph node biopsy (5/11/11); lymph node dissection (N/A, 2011); and Tonsillectomy. His family history includes Heart Disease in his mother. He reports that he has never smoked.  He has never used smokeless tobacco. He reports that he does not drink alcohol and does not use drugs. Medications     Previous Medications    AMLODIPINE (NORVASC) 5 MG TABLET    Take 1 tablet by mouth daily    SOTALOL (BETAPACE) 80 MG TABLET    TAKE ONE TABLET BY MOUTH TWICE A DAY    WARFARIN (COUMADIN) 5 MG TABLET    TAKE 1/2 TABLET BY MOUTH DAILY ON MONDAYS AND FRIDAYS AND TAKE ONE TABLET BY MOUTH DAILY ON ALL OTHER DAYS       Allergies     He is allergic to gentamicin. Physical Exam     INITIAL VITALS: BP: 116/78, Temp: 98.4 °F (36.9 °C), Heart Rate: (!) 104, Resp: 21, SpO2: 100 %   Physical Exam  Vitals reviewed. Constitutional:       Interventions: He is not intubated. HENT:      Head: Normocephalic. Mouth/Throat:      Mouth: Mucous membranes are moist.   Eyes:      Extraocular Movements: Extraocular movements intact. Pupils: Pupils are equal, round, and reactive to light. Cardiovascular:      Rate and Rhythm: Tachycardia present. Pulses: Normal pulses. Heart sounds: Normal heart sounds. Pulmonary:      Effort: Pulmonary effort is normal. No tachypnea, bradypnea, accessory muscle usage, prolonged expiration, respiratory distress or retractions. He is not intubated. Breath sounds: Normal breath sounds and air entry. No stridor, decreased air movement or transmitted upper airway sounds. No decreased breath sounds. Abdominal:      General: Bowel sounds are normal. There is no distension. Palpations: Abdomen is soft. There is no mass. Tenderness: There is no abdominal tenderness. There is no right CVA tenderness, left CVA tenderness, guarding or rebound. Hernia: No hernia is present. Musculoskeletal:         General: Normal range of motion. Cervical back: Full passive range of motion without pain and normal range of motion. Skin:     General: Skin is warm. Neurological:      General: No focal deficit present. Mental Status: He is alert and oriented to person, place, and time.    Psychiatric: Axis 35 degrees    T Axis 74 degrees    Diagnosis       EKG performed in ER and to be interpreted by ER physician. Confirmed by MD, ER ([id number]),  Milderd Frame ([phone]) on 7/22/2022 5:53:07 PM   TYPE AND SCREEN   Result Value Ref Range    ABO/Rh B POS     Antibody Screen NEG        ED BEDSIDE ULTRASOUND:  No results found. RECENT VITALS:  BP: 130/73, Temp: 98.4 °F (36.9 °C), Heart Rate: 85,Resp: 16, SpO2: 99 %     Procedures         ED Course     Nursing Notes, Past Medical Hx, Past Surgical Hx, Social Hx, Allergies, and Family Hx were reviewed. ED Course as of 07/22/22 1836   Fri Jul 22, 2022   1656 Hemoglobin Quant(!): 10.7 [ED]      ED Course User Index  [ED] Kiara Cervantes MD       The patient was given the followingmedications:  No orders of the defined types were placed in this encounter. CONSULTS:  None    MEDICAL DECISION MAKING / ASSESSMENT / PLAN     Julia Coughlin is a 67 y.o. male PMhx of Atrial fibrillation, HTN presenting to the ED  with  melena had some rectal bleeding  2 days ago which has resolved, But still has black stools since the past two days, he was suggested by cardiologist to see GI, he is also having shortness of breath worse with exertion only taking 5 steps which is not there for 2 days ago. CBC, INR, BMP, EKG ordered due to shortness of breath and concerns for GI bleeding. Labs reveal hemoglobin of 10.7 based last 1 back in 2017 was 18.0, his INR is 3.36 despite patient not having Coumadin due to GI bleed, will give 40 mg of Protonix and call the hospitalist for admission as patient will need frequent H&H monitoring of INR and possible EGD by gastroenterology. This patient was also evaluated by the attending physician. All care plans werediscussed and agreed upon. Clinical Impression     1. Melena    2. Elevated INR        Disposition     PATIENT REFERRED TO:  No follow-up provider specified.     DISCHARGE MEDICATIONS:  New Prescriptions    No medications on file       DISPOSITION    To be admitted to the [facility], MD  07/22/22 6555

## 2022-07-23 ENCOUNTER — ANESTHESIA EVENT (OUTPATIENT)
Dept: ENDOSCOPY | Age: 72
DRG: 378 | End: 2022-07-23
Payer: MEDICARE

## 2022-07-23 ENCOUNTER — ANESTHESIA (OUTPATIENT)
Dept: ENDOSCOPY | Age: 72
DRG: 378 | End: 2022-07-23
Payer: MEDICARE

## 2022-07-23 LAB
ANION GAP SERPL CALCULATED.3IONS-SCNC: 11 MMOL/L (ref 3–16)
APTT: 36.1 SEC (ref 23–34.3)
BASOPHILS ABSOLUTE: 0.1 K/UL (ref 0–0.2)
BASOPHILS RELATIVE PERCENT: 0.6 %
BUN BLDV-MCNC: 47 MG/DL (ref 7–20)
CALCIUM SERPL-MCNC: 8.9 MG/DL (ref 8.3–10.6)
CHLORIDE BLD-SCNC: 111 MMOL/L (ref 99–110)
CO2: 24 MMOL/L (ref 21–32)
CREAT SERPL-MCNC: 1.3 MG/DL (ref 0.8–1.3)
EOSINOPHILS ABSOLUTE: 0 K/UL (ref 0–0.6)
EOSINOPHILS RELATIVE PERCENT: 0.3 %
GFR AFRICAN AMERICAN: >60
GFR NON-AFRICAN AMERICAN: 54
GLUCOSE BLD-MCNC: 116 MG/DL (ref 70–99)
HCT VFR BLD CALC: 25.6 % (ref 40.5–52.5)
HCT VFR BLD CALC: 27.2 % (ref 40.5–52.5)
HCT VFR BLD CALC: 27.9 % (ref 40.5–52.5)
HCT VFR BLD CALC: 28.1 % (ref 40.5–52.5)
HEMOGLOBIN: 8.6 G/DL (ref 13.5–17.5)
HEMOGLOBIN: 8.9 G/DL (ref 13.5–17.5)
HEMOGLOBIN: 9 G/DL (ref 13.5–17.5)
HEMOGLOBIN: 9.1 G/DL (ref 13.5–17.5)
IMMATURE RETIC FRACT: 0.61 (ref 0.21–0.37)
INR BLD: 4.03 (ref 0.87–1.14)
IRON SATURATION: 46 % (ref 20–50)
IRON: 110 UG/DL (ref 59–158)
LYMPHOCYTES ABSOLUTE: 1.8 K/UL (ref 1–5.1)
LYMPHOCYTES RELATIVE PERCENT: 15.9 %
MAGNESIUM: 1.9 MG/DL (ref 1.8–2.4)
MCH RBC QN AUTO: 31.1 PG (ref 26–34)
MCHC RBC AUTO-ENTMCNC: 32.8 G/DL (ref 31–36)
MCV RBC AUTO: 94.8 FL (ref 80–100)
MONOCYTES ABSOLUTE: 1.1 K/UL (ref 0–1.3)
MONOCYTES RELATIVE PERCENT: 9.2 %
NEUTROPHILS ABSOLUTE: 8.6 K/UL (ref 1.7–7.7)
NEUTROPHILS RELATIVE PERCENT: 74 %
OCCULT BLOOD DIAGNOSTIC: ABNORMAL
PDW BLD-RTO: 14.3 % (ref 12.4–15.4)
PLATELET # BLD: 182 K/UL (ref 135–450)
PMV BLD AUTO: 8.1 FL (ref 5–10.5)
POTASSIUM SERPL-SCNC: 4.1 MMOL/L (ref 3.5–5.1)
PROTHROMBIN TIME: 39.6 SEC (ref 11.7–14.5)
RBC # BLD: 2.87 M/UL (ref 4.2–5.9)
RETICULOCYTE ABSOLUTE COUNT: 0.1 M/UL
RETICULOCYTE COUNT PCT: 3.33 % (ref 0.5–2.18)
SODIUM BLD-SCNC: 146 MMOL/L (ref 136–145)
TOTAL IRON BINDING CAPACITY: 241 UG/DL (ref 260–445)
WBC # BLD: 11.6 K/UL (ref 4–11)

## 2022-07-23 PROCEDURE — C9113 INJ PANTOPRAZOLE SODIUM, VIA: HCPCS | Performed by: STUDENT IN AN ORGANIZED HEALTH CARE EDUCATION/TRAINING PROGRAM

## 2022-07-23 PROCEDURE — 0DJ08ZZ INSPECTION OF UPPER INTESTINAL TRACT, VIA NATURAL OR ARTIFICIAL OPENING ENDOSCOPIC: ICD-10-PCS | Performed by: INTERNAL MEDICINE

## 2022-07-23 PROCEDURE — 85610 PROTHROMBIN TIME: CPT

## 2022-07-23 PROCEDURE — 83550 IRON BINDING TEST: CPT

## 2022-07-23 PROCEDURE — 3609017100 HC EGD: Performed by: INTERNAL MEDICINE

## 2022-07-23 PROCEDURE — 85025 COMPLETE CBC W/AUTO DIFF WBC: CPT

## 2022-07-23 PROCEDURE — 83540 ASSAY OF IRON: CPT

## 2022-07-23 PROCEDURE — 3700000001 HC ADD 15 MINUTES (ANESTHESIA): Performed by: INTERNAL MEDICINE

## 2022-07-23 PROCEDURE — 1200000000 HC SEMI PRIVATE

## 2022-07-23 PROCEDURE — 94660 CPAP INITIATION&MGMT: CPT

## 2022-07-23 PROCEDURE — 6360000002 HC RX W HCPCS: Performed by: ANESTHESIOLOGY

## 2022-07-23 PROCEDURE — 7100000011 HC PHASE II RECOVERY - ADDTL 15 MIN: Performed by: INTERNAL MEDICINE

## 2022-07-23 PROCEDURE — 85018 HEMOGLOBIN: CPT

## 2022-07-23 PROCEDURE — 85730 THROMBOPLASTIN TIME PARTIAL: CPT

## 2022-07-23 PROCEDURE — 6370000000 HC RX 637 (ALT 250 FOR IP)

## 2022-07-23 PROCEDURE — 2580000003 HC RX 258: Performed by: STUDENT IN AN ORGANIZED HEALTH CARE EDUCATION/TRAINING PROGRAM

## 2022-07-23 PROCEDURE — 7100000010 HC PHASE II RECOVERY - FIRST 15 MIN: Performed by: INTERNAL MEDICINE

## 2022-07-23 PROCEDURE — 80048 BASIC METABOLIC PNL TOTAL CA: CPT

## 2022-07-23 PROCEDURE — A4216 STERILE WATER/SALINE, 10 ML: HCPCS | Performed by: STUDENT IN AN ORGANIZED HEALTH CARE EDUCATION/TRAINING PROGRAM

## 2022-07-23 PROCEDURE — 3700000000 HC ANESTHESIA ATTENDED CARE: Performed by: INTERNAL MEDICINE

## 2022-07-23 PROCEDURE — 36415 COLL VENOUS BLD VENIPUNCTURE: CPT

## 2022-07-23 PROCEDURE — 85045 AUTOMATED RETICULOCYTE COUNT: CPT

## 2022-07-23 PROCEDURE — 2580000003 HC RX 258

## 2022-07-23 PROCEDURE — 6360000002 HC RX W HCPCS: Performed by: STUDENT IN AN ORGANIZED HEALTH CARE EDUCATION/TRAINING PROGRAM

## 2022-07-23 PROCEDURE — 6360000002 HC RX W HCPCS

## 2022-07-23 PROCEDURE — 83735 ASSAY OF MAGNESIUM: CPT

## 2022-07-23 PROCEDURE — 2580000003 HC RX 258: Performed by: ANESTHESIOLOGY

## 2022-07-23 PROCEDURE — 6370000000 HC RX 637 (ALT 250 FOR IP): Performed by: STUDENT IN AN ORGANIZED HEALTH CARE EDUCATION/TRAINING PROGRAM

## 2022-07-23 PROCEDURE — 85014 HEMATOCRIT: CPT

## 2022-07-23 RX ORDER — DIPHENHYDRAMINE HYDROCHLORIDE 50 MG/ML
12.5 INJECTION INTRAMUSCULAR; INTRAVENOUS
Status: CANCELLED | OUTPATIENT
Start: 2022-07-23 | End: 2022-07-23

## 2022-07-23 RX ORDER — METOCLOPRAMIDE HYDROCHLORIDE 5 MG/ML
10 INJECTION INTRAMUSCULAR; INTRAVENOUS
Status: CANCELLED | OUTPATIENT
Start: 2022-07-23 | End: 2022-07-23

## 2022-07-23 RX ORDER — HYDRALAZINE HYDROCHLORIDE 20 MG/ML
10 INJECTION INTRAMUSCULAR; INTRAVENOUS
Status: CANCELLED | OUTPATIENT
Start: 2022-07-23

## 2022-07-23 RX ORDER — SODIUM CHLORIDE 0.9 % (FLUSH) 0.9 %
5-40 SYRINGE (ML) INJECTION EVERY 12 HOURS SCHEDULED
Status: CANCELLED | OUTPATIENT
Start: 2022-07-23

## 2022-07-23 RX ORDER — MEPERIDINE HYDROCHLORIDE 25 MG/ML
12.5 INJECTION INTRAMUSCULAR; INTRAVENOUS; SUBCUTANEOUS EVERY 5 MIN PRN
Status: CANCELLED | OUTPATIENT
Start: 2022-07-23

## 2022-07-23 RX ORDER — OXYCODONE HYDROCHLORIDE 5 MG/1
10 TABLET ORAL PRN
Status: CANCELLED | OUTPATIENT
Start: 2022-07-23 | End: 2022-07-23

## 2022-07-23 RX ORDER — PROPOFOL 10 MG/ML
INJECTION, EMULSION INTRAVENOUS PRN
Status: DISCONTINUED | OUTPATIENT
Start: 2022-07-23 | End: 2022-07-23 | Stop reason: SDUPTHER

## 2022-07-23 RX ORDER — LABETALOL HYDROCHLORIDE 5 MG/ML
10 INJECTION, SOLUTION INTRAVENOUS
Status: CANCELLED | OUTPATIENT
Start: 2022-07-23

## 2022-07-23 RX ORDER — OXYCODONE HYDROCHLORIDE 5 MG/1
5 TABLET ORAL PRN
Status: CANCELLED | OUTPATIENT
Start: 2022-07-23 | End: 2022-07-23

## 2022-07-23 RX ORDER — SODIUM CHLORIDE 9 MG/ML
INJECTION, SOLUTION INTRAVENOUS CONTINUOUS PRN
Status: DISCONTINUED | OUTPATIENT
Start: 2022-07-23 | End: 2022-07-23 | Stop reason: SDUPTHER

## 2022-07-23 RX ORDER — SODIUM CHLORIDE 0.9 % (FLUSH) 0.9 %
5-40 SYRINGE (ML) INJECTION PRN
Status: CANCELLED | OUTPATIENT
Start: 2022-07-23

## 2022-07-23 RX ORDER — SODIUM CHLORIDE 9 MG/ML
25 INJECTION, SOLUTION INTRAVENOUS PRN
Status: CANCELLED | OUTPATIENT
Start: 2022-07-23

## 2022-07-23 RX ADMIN — SODIUM CHLORIDE, PRESERVATIVE FREE 40 MG: 5 INJECTION INTRAVENOUS at 08:48

## 2022-07-23 RX ADMIN — AMLODIPINE BESYLATE 5 MG: 5 TABLET ORAL at 08:48

## 2022-07-23 RX ADMIN — SODIUM CHLORIDE: 9 INJECTION, SOLUTION INTRAVENOUS at 11:28

## 2022-07-23 RX ADMIN — SODIUM CHLORIDE, PRESERVATIVE FREE 40 MG: 5 INJECTION INTRAVENOUS at 23:09

## 2022-07-23 RX ADMIN — PROPOFOL 200 MG: 10 INJECTION, EMULSION INTRAVENOUS at 11:32

## 2022-07-23 RX ADMIN — SOTALOL HYDROCHLORIDE 40 MG: 80 TABLET ORAL at 23:09

## 2022-07-23 RX ADMIN — PROPOFOL 100 MG: 10 INJECTION, EMULSION INTRAVENOUS at 11:35

## 2022-07-23 RX ADMIN — PHYTONADIONE 5 MG: 10 INJECTION, EMULSION INTRAMUSCULAR; INTRAVENOUS; SUBCUTANEOUS at 13:05

## 2022-07-23 RX ADMIN — PROPOFOL 100 MG: 10 INJECTION, EMULSION INTRAVENOUS at 11:34

## 2022-07-23 RX ADMIN — SODIUM CHLORIDE, PRESERVATIVE FREE 10 ML: 5 INJECTION INTRAVENOUS at 08:48

## 2022-07-23 RX ADMIN — SODIUM CHLORIDE, PRESERVATIVE FREE 10 ML: 5 INJECTION INTRAVENOUS at 23:09

## 2022-07-23 RX ADMIN — BENZOCAINE: 200 SPRAY DENTAL; ORAL; PERIODONTAL at 15:04

## 2022-07-23 RX ADMIN — SOTALOL HYDROCHLORIDE 40 MG: 80 TABLET ORAL at 08:48

## 2022-07-23 ASSESSMENT — ENCOUNTER SYMPTOMS
COUGH: 0
SHORTNESS OF BREATH: 1
BLOOD IN STOOL: 1
ABDOMINAL PAIN: 0

## 2022-07-23 ASSESSMENT — PAIN SCALES - WONG BAKER
WONGBAKER_NUMERICALRESPONSE: 0

## 2022-07-23 NOTE — BRIEF OP NOTE
Brief Postoperative Note    Bobbi Wheeler  YOB: 1950  [id number]    Pre-operative Diagnosis: Melena    Post-operative Diagnosis: Same    Procedure: EGD    Anesthesia: General    Surgeons/Assistants: Jadiel Cruz MD    Estimated Blood Loss: None    Complications: None    Specimens: Was Not Obtained    Findings: See dictated report    Electronically signed by Jadiel Cruz MD on 7/23/2022 at 11:45 AM

## 2022-07-23 NOTE — PROGRESS NOTES
Admission: Patient received to room 6328 from ED. Patient admitted with Dx of GI Bleed. Patient A&Ox4 upon arrival. Patient denies c/o upon arrival.Tele monitor applied, rate and rhythm verified with monitor reader. Admission assessment as charted. VSS. Patient oriented to room, staff, and call system. Educated on fall protocol and hourly rounding. Patient informed to utilize call light with any needs. Pt verbalized understanding. Will continue to monitor.

## 2022-07-23 NOTE — PROGRESS NOTES
Physician Progress Note      PATIENT:               Evi Campuzano  CSN #:                  [id number]  :                       1950  ADMIT DATE:       2022 3:08 PM  [address] [place] DATE:  RESPONDING  PROVIDER #:        James Alatorre MD          QUERY TEXT:    Patient admitted with GIB and noted gastric ulcers, and noted to have \"Acute   anemia secondary to GI Bleed\". I If possible, please document in progress   notes and discharge summary further specificity regarding the acuity and type   of anemia:    The medical record reflects the following:  Risk Factors: multiple gastric ulcers, elevated INR  Clinical Indicators: HGB: 10.7- 8.9, 18.2 in 2017. Per pn's \"Anemia- likely   2/2 to GI Bleed\"  Treatment: GI c/s, EGD, Coumadin held, IV Protonix  Options provided:  -- Anemia due to acute blood loss  -- Other - I will add my own diagnosis  -- Disagree - Not applicable / Not valid  -- Disagree - Clinically unable to determine / Unknown  -- Refer to Clinical Documentation Reviewer    PROVIDER RESPONSE TEXT:    This patient has acute blood loss anemia. Query created by:  Adam Quigley on 2022 1:45 PM      Electronically signed by:  James Alatorre MD 2022 3:26 PM

## 2022-07-23 NOTE — OP NOTE
[facility] [address]                                OPERATIVE REPORT    PATIENT NAME: Alfredo Ruiz                    :        1950  MED REC NO:   [id number]                          ROOM:       6328  ACCOUNT NO:   [de-identified]                           ADMIT DATE: 2022  PROVIDER:     Prisca Ingram MD    EGD    DATE OF PROCEDURE:  2022    REFERRING PROVIDERS:  Dr. Yordy Atkinson; Napoleon Oliver MD    PATIENT HISTORY:  This is a 76-year-old male, inpatient. INSTRUMENT USED:  Olympus GIF-Q180. ANESTHESIA:  The patient received general anesthesia with endotracheal  intubation. INDICATIONS:  The patient has presented with melena. The patient is on  chronic Coumadin therapy with a supratherapeutic INR. PROCEDURE:  The endoscope was inserted into the esophagus without  difficulty. The esophageal mucosa was entirely normal, revealing no  evidence of inflammatory or metaplastic change. The Z-line was located  at 42 cm. In the stomach, there were multiple small nonbleeding antral  ulcers with associated edema and inflammatory change. There were no  stigmata of impending hemorrhage. The pylorus was patent. The duodenal  bulb and descending duodenum were normal.    ESTIMATED BLOOD LOSS:  None. IMPRESSION:  Multiple small, nonbleeding gastric antral ulcers as  described above with surrounding inflammatory change. PLAN:  The patient will continue his IV PPI b.i.d. as ordered. He can  take clear liquids by mouth for now. The hemoglobin level will be  followed. Vitamin K can be given to decrease the INR. Given the  absence of active bleeding, it does not have to be completely reversed  unless there is further recurrent bleeding. LUIS Rey MD    D: 2022 12:07:54       T: 2022 12:11:40     [email or web address]  Job#: [id number]     Doc#: [id number]    CC:  Prisca Fernandes MD

## 2022-07-23 NOTE — CONSULTS
[facility] [address]                                  CONSULTATION    PATIENT NAME: Idania Armendariz                      :        1950  MED REC NO:   [id number]                          ROOM:       6328  ACCOUNT NO:   [de-identified]                           ADMIT DATE: 2022  PROVIDER:     Yulissa Bernard MD    CONSULT DATE:  2022    REFERRING PROVIDERS:  Kuldip Mcqueen MD, and Socorro Mojica MD    HISTORY OF PRESENT ILLNESS:  The patient is a 69-year-old white male  with a history of chronic atrial fibrillation, treated with Coumadin. He has nonrheumatic mitral valve regurgitation. He does have a previous  history of peptic ulcer disease. He has presented with a several-day  history of melena. The hemoglobin on admission was 10.7. It is  currently 8.9. The BUN was markedly elevated at 60. The patient has no  complaints of abdominal pain. Also noted was a markedly elevated INR at  4.03.  IV vitamin K is to be given. PAST MEDICAL HISTORY:  Remarkable for atrial fibrillation, mitral  regurgitation, peptic ulcer disease, hypertension, obstructive sleep  apnea, BPH, and non-Hodgkin lymphoma. PAST SURGICAL HISTORY:  Previous abdominal surgeries have included an  appendectomy. MEDICATIONS AT HOME:  Included the Coumadin, Norvasc, and Betapace. ALLERGIES:  An allergy to GENTAMICIN was listed. FAMILY HISTORY:  Not obtained. SOCIAL HISTORY:  The patient does not use tobacco or ethanol. PHYSICAL EXAMINATION:  VITAL SIGNS:  Blood pressure 130/78. Pulse 72. Temperature 97.5  degrees orally. GENERAL:  The patient appeared as an alert and cooperative elderly white  male in no acute distress. He was being positioned for an emergency EGD  and a more extensive physical exam was not performed at this time.     IMPRESSION:  A 69-year-old male with melena and a decreasing hemoglobin  level in the setting of a supratherapeutic INR. PLAN:  An EGD will now be performed. LUIS Card MD    D: 07/23/2022 12:12:03       T: 07/23/2022 12:15:45     [email or web address]  Job#: [id number]     Doc#: [id number]    CC:  Janice Gonzalez MD       [address] [place], [place] [address]Orlando Storm MD

## 2022-07-23 NOTE — OP NOTE
Operative Note      Patient: Kash Oden  YOB: 1950  MRN: [id number]    Date of Procedure: 7/23/2022    Pre-Op Diagnosis: Hemoptysis [R04.2]    Post-Op Diagnosis: Same       Procedure(s):  EGD DIAGNOSTIC ONLY    Surgeon(s):  Sandy Huff MD    Assistant:   * No surgical staff found *    Anesthesia: Monitor Anesthesia Care    Estimated Blood Loss (mL): None    Complications: None    Specimens:   * No specimens in log *    Implants:  * No implants in log *      Drains: * No LDAs found *    Findings: See dictated report    Detailed Description of Procedure:   See dictated report    Electronically signed by Sandy Huff MD on 7/23/2022 at 11:46 AM

## 2022-07-23 NOTE — ANESTHESIA POSTPROCEDURE EVALUATION
Department of Anesthesiology  Postprocedure Note    Patient: Sharon Buckley  MRN: [id number]  YOB: 1950  Date of evaluation: 7/23/2022      Procedure Summary     Date: 07/23/22 Room / Location: [facility]    Anesthesia Start: 1128 Anesthesia Stop: 1210    Procedure: EGD DIAGNOSTIC ONLY Diagnosis:       Hemoptysis      (Hemoptysis [R04.2])    Surgeons: Chad Clifton MD Responsible Provider: Damián Saba MD    Anesthesia Type: general ASA Status: 3          Anesthesia Type: No value filed.     Shirley Phase I: Shirley Score: 10    Shirley Phase II: Shirley Score: 9      Anesthesia Post Evaluation    Patient location during evaluation: PACU  Patient participation: complete - patient participated  Level of consciousness: awake and alert  Pain score: 0  Airway patency: patent  Nausea & Vomiting: no nausea and no vomiting  Complications: no  Cardiovascular status: hemodynamically stable  Respiratory status: acceptable  Hydration status: euvolemic

## 2022-07-23 NOTE — PROGRESS NOTES
[facility]  -  for Dr. Dev Sorto  Full note dictated    EGD -  Multiple small non-bleeding gastric antral ulcers  No stigmata of impending hemorrhage    REC:  PPI as ordered  Clear liquids po for now  Serial H/H  OK for vitamin K to decrease the INR - given the absence of active bleeding, it does not have to be completely reversed

## 2022-07-23 NOTE — ANESTHESIA PRE PROCEDURE
Department of Anesthesiology  Preprocedure Note       Name:  Santino Estimable   Age:  67 y.o.  :  1950                                          MRN:  [id number]         Date:  2022      Surgeon: Keny Dubois):  Zaida Dill MD    Procedure: Procedure(s):  EGD DIAGNOSTIC ONLY    Medications prior to admission:   Prior to Admission medications    Medication Sig Start Date End Date Taking? Authorizing Provider   sotalol (BETAPACE) 80 MG tablet Take 40 mg by mouth in the morning and 40 mg before bedtime. Yes Historical Provider, MD   amLODIPine (NORVASC) 5 MG tablet Take 1 tablet by mouth daily 22   Vern Tapia MD   warfarin (COUMADIN) 5 MG tablet TAKE 1/2 TABLET BY MOUTH DAILY ON  AND  AND TAKE ONE TABLET BY MOUTH DAILY ON ALL OTHER DAYS 22   Vern Tapia MD       Current medications:    Current Facility-Administered Medications   Medication Dose Route Frequency Provider Last Rate Last Admin    phytonadione (ADULT) (VITAMIN K) 5 mg in dextrose 5 % 100 mL IVPB  5 mg IntraVENous Once Maria Eugenia Alcantar MD        amLODIPine (NORVASC) tablet 5 mg  5 mg Oral Daily London Ahmadi MD   5 mg at 22 0848    sotalol (BETAPACE) tablet 40 mg  40 mg Oral BID London Ahmadi MD   40 mg at 22 0848    sodium chloride flush 0.9 % injection 5-40 mL  5-40 mL IntraVENous 2 times per day London Ahmadi MD   10 mL at 22 0848    sodium chloride flush 0.9 % injection 5-40 mL  5-40 mL IntraVENous PRN London Ahmadi MD        0.9 % sodium chloride infusion   IntraVENous PRN London Ahmadi MD        acetaminophen (TYLENOL) tablet 650 mg  650 mg Oral Q6H PRN London Ahmadi MD        Or    acetaminophen (TYLENOL) suppository 650 mg  650 mg Rectal Q6H PRN London Ahmadi MD        pantoprazole (PROTONIX) 40 mg in sodium chloride (PF) 10 mL injection  40 mg IntraVENous Q12H London Ahmadi MD   40 mg at 22 0848       Allergies:     Allergies   Allergen Reactions  Gentamicin Other (See Comments)     Dad had reaction       Problem List:    Patient Active Problem List   Diagnosis Code    Long term current use of anticoagulant therapy Z79.01    Atrial fibrillation I48.91    DLBCL (diffuse large B cell lymphoma) (HCC) C83.30    Nephrolithiasis N20.0    Pure hypercholesterolemia E78.00    Impaired fasting glucose R73.01    MR (mitral regurgitation) I34.0    Colon polyp, hyperplastic K63.5    Internal hemorrhoids K64.8    Varicose vein of leg I83.90    Family history of gastric ulcer Z83.79    DVT (deep venous thrombosis) (HCC) I82.409    Subdural hematoma ([address] S06.5X9A    Sciatica M54.30    Encounter for follow-up surveillance of lymphoma Z08, Z85.72    Polycythemia D75.1    Primary insomnia F51.01    Obstructive sleep apnea G47.33    Seborrhea L21.9    PV (polycythemia vera) (HCC) D45    Gastroesophageal reflux disease K21.9    Weakness R53.1    Essential hypertension I10    Upper GI bleed K92.2       Past Medical History:        Diagnosis Date    AF (atrial fibrillation) (HCC)     Aortic valve disease     leaking no problems    Lymphoma (HCC)        Past Surgical History:        Procedure Laterality Date    APPENDECTOMY      COLONOSCOPY  8/18/2006     Dr. Jose Elias - internal hemorrhoids , hyperplastic polyp     CYSTOSCOPY  1-11-10     Dr. Monet Steel ureteral stent     LYMPH NODE BIOPSY  5/11/11    RIGHT CERVICAL    LYMPH NODE DISSECTION N/A 2011    TONSILLECTOMY         Social History:    Social History     Tobacco Use    Smoking status: Never    Smokeless tobacco: Never   Substance Use Topics    Alcohol use: No     Alcohol/week: 0.0 standard drinks                                Counseling given: Not Answered      Vital Signs (Current):   Vitals:    07/23/22 0017 07/23/22 0255 07/23/22 0435 07/23/22 0856   BP: 107/62  (!) 109/55 130/78   Pulse: 81  71 72   Resp: 18 18 18 18   Temp: 96.9 °F (36.1 °C)  97.3 °F (36.3 °C) 97.5 °F (36.4 °C) TempSrc: Oral  Oral Oral   SpO2: 100%  100% 100%   Weight:       Height:                                                  BP Readings from Last 3 Encounters:   07/23/22 130/78   03/10/22 134/80   10/14/21 120/80       NPO Status:                                                                                 BMI:   Wt Readings from Last 3 Encounters:   07/22/22 201 lb 1 oz (91.2 kg)   03/10/22 220 lb (99.8 kg)   10/14/21 223 lb (101.2 kg)     Body mass index is 25.81 kg/m². CBC:   Lab Results   Component Value Date/Time    WBC 11.6 07/23/2022 07:24 AM    RBC 2.87 07/23/2022 07:24 AM    RBC 5.60 06/09/2017 01:49 PM    HGB 8.9 07/23/2022 07:24 AM    HCT 27.2 07/23/2022 07:24 AM    MCV 94.8 07/23/2022 07:24 AM    RDW 14.3 07/23/2022 07:24 AM     07/23/2022 07:24 AM       CMP:   Lab Results   Component Value Date/Time     07/23/2022 07:24 AM    K 4.1 07/23/2022 07:24 AM    K 4.5 07/22/2022 03:37 PM     07/23/2022 07:24 AM    CO2 24 07/23/2022 07:24 AM    BUN 47 07/23/2022 07:24 AM    CREATININE 1.3 07/23/2022 07:24 AM    GFRAA >60 07/23/2022 07:24 AM    GFRAA >60 05/03/2011 10:49 AM    AGRATIO 1.1 10/21/2016 09:47 AM    LABGLOM 54 07/23/2022 07:24 AM    GLUCOSE 116 07/23/2022 07:24 AM    GLUCOSE 88 10/21/2016 09:47 AM    PROT 7.7 10/21/2016 09:47 AM    CALCIUM 8.9 07/23/2022 07:24 AM    BILITOT 1.2 10/21/2016 09:47 AM    ALKPHOS 66 10/21/2016 09:47 AM    ALKPHOS 53 10/11/2012 12:00 AM    AST 47 10/21/2016 09:47 AM    ALT 36 10/21/2016 09:47 AM       POC Tests: No results for input(s): POCGLU, POCNA, POCK, POCCL, POCBUN, POCHEMO, POCHCT in the last 72 hours.     Coags:   Lab Results   Component Value Date/Time    PROTIME 39.6 07/23/2022 07:24 AM    PROTIME 36.3 10/29/2015 10:41 AM    INR 4.03 07/23/2022 07:24 AM    APTT 36.1 07/23/2022 07:24 AM       HCG (If Applicable): No results found for: PREGTESTUR, PREGSERUM, HCG, HCGQUANT     ABGs: No results found for: PHART, PO2ART, ZEL3YDN, LSD6RCR, BEART, J8ZUXZDI     Type & Screen (If Applicable):  No results found for: LABABO, LABRH    Drug/Infectious Status (If Applicable):  No results found for: HIV, HEPCAB    COVID-19 Screening (If Applicable): No results found for: COVID19        Anesthesia Evaluation  Patient summary reviewed and Nursing notes reviewed  Airway: Mallampati: II  TM distance: >3 FB   Neck ROM: full  Mouth opening: > = 3 FB   Dental:          Pulmonary:   (+) sleep apnea:                             Cardiovascular:    (+) hypertension:, dysrhythmias: atrial fibrillation,                   Neuro/Psych:   (+) neuromuscular disease:,             GI/Hepatic/Renal:   (+) GERD:,           Endo/Other:                     Abdominal:             Vascular: Other Findings:           Anesthesia Plan      general     ASA 1    (44-year-old male presents for esophagogastroduodenoscopy. Plan general anesthesia with ASA standard monitors. Questions answered. Patient agreeable with anesthetic plan.  )  Induction: intravenous. Anesthetic plan and risks discussed with patient.         Attending anesthesiologist reviewed and agrees with Zhao Carbajal MD   7/23/2022

## 2022-07-23 NOTE — H&P
[facility]   Pre-operative History and Physical    Patient: Boom Hong  : 1950  Acct#:         HISTORY OF PRESENT ILLNESS:    The patient is a 67 y.o. male  who presents with melena  Past Medical History:        Diagnosis Date    AF (atrial fibrillation) (HCC)     Aortic valve disease     leaking no problems    Lymphoma ([address]      Past Surgical History:        Procedure Laterality Date    APPENDECTOMY      COLONOSCOPY  2006     Dr. Haleigh Kidd - internal hemorrhoids , hyperplastic polyp     CYSTOSCOPY  1-11-10     Dr. Colón All ureteral stent     LYMPH NODE BIOPSY  11    RIGHT CERVICAL    LYMPH NODE DISSECTION N/A     TONSILLECTOMY       Medications prior to admission:   Prior to Admission medications    Medication Sig Start Date End Date Taking? Authorizing Provider   sotalol (BETAPACE) 80 MG tablet Take 40 mg by mouth in the morning and 40 mg before bedtime.    Yes Historical Provider, MD   amLODIPine (NORVASC) 5 MG tablet Take 1 tablet by mouth daily 22   Lowell Sanders MD   warfarin (COUMADIN) 5 MG tablet TAKE 1/2 TABLET BY MOUTH DAILY ON  AND  AND TAKE ONE TABLET BY MOUTH DAILY ON ALL OTHER DAYS 22   Lowell Sanders MD     Allergies:    Gentamicin  Social History:   Social History     Socioeconomic History    Marital status:      Spouse name: Not on file    Number of children: 0    Years of education: Not on file    Highest education level: Not on file   Occupational History    Occupation: retired   Tobacco Use    Smoking status: Never    Smokeless tobacco: Never   Vaping Use    Vaping Use: Never used   Substance and Sexual Activity    Alcohol use: No     Alcohol/week: 0.0 standard drinks    Drug use: No    Sexual activity: Yes     Partners: Female   Other Topics Concern    Not on file   Social History Narrative    Not on file     Social Determinants of Health     Financial Resource Strain: Not on file   Food Insecurity: Not on file   Transportation Needs: Not on file   Physical Activity: Not on file   Stress: Not on file   Social Connections: Not on file   Intimate Partner Violence: Not on file   Housing Stability: Not on file           Family History:   Family History   Problem Relation Age of Onset    Heart Disease Mother          PHYSICAL EXAM:      /78   Pulse 72   Temp 97.5 °F (36.4 °C) (Oral)   Resp 18   Ht 6' 2\" (1.88 m)   Wt 201 lb 1 oz (91.2 kg)   SpO2 100%   BMI 25.81 kg/m²  I        Heart: Normal    Lungs: Normal    Abdomen: Normal      ASA Grade: ASA 3 - Patient with moderate systemic disease with functional limitations    III (soft palate, base of uvula visible)  ASSESSMENT AND PLAN:    1. Patient is a 67 y.o. male here for EGD  2. Procedure options, risks and benefits reviewed with patient who expresses understanding.

## 2022-07-23 NOTE — PROGRESS NOTES
Progress Note    Admit Date: 7/22/2022  Day: 2  Diet: Diet NPO Exceptions are: Ice Chips, Sips of Clear Liquids    CC: melena    Interval history:   - VSS  - Patient reports having melena yesterday  - Hemoglobin stable at 8.9  - NPO at [place] in anticipation of EGD today    HPI: Solitario Canas  is a 67y.o. year old male w/PMH chronic a.fibrillation (on warfarin), nonrheumatic mitral valve regurgitation, gastric ulcer (previously on ranitidine), HTN, VERA (on CPAP), BPH, internal hemmorhoids, and diffuse non-Hodgkin large cell lymphoma (2011) in remission who presented to the ED w/cc rectal bleeding. The rectal bleeding began on Wednesday night to early Thursday after eating at out on Wednesday. Patient reports having bloody red diarrhea with subsequent solid black stools. He reports that he had an associated SOB on exertion which occurs after 4-6 steps and contacted his cardiologist. Patient reports that this is new for him and he usually exercises 3/week w/intentional weight loss. He has associated nausea and had his last melanotic stool on the morning of the day of presentation. He denies any associated vomiting. AT the ED patient was hemodynamically stable. He presented w/INR at 3.36, anemia with Hb at 10.7, and w/elevated BUN at 60. Patient was hyperglycemic at 175, and ECG was consistent with atrial fibrillation with rate controlled.       Medications:     Scheduled Meds:   amLODIPine  5 mg Oral Daily    sotalol  40 mg Oral BID    sodium chloride flush  5-40 mL IntraVENous 2 times per day    pantoprazole (PROTONIX) 40 mg injection  40 mg IntraVENous Q12H     Continuous Infusions:   sodium chloride       PRN Meds:sodium chloride flush, sodium chloride, acetaminophen **OR** acetaminophen    Objective:   Vitals:   T-max:  Patient Vitals for the past 8 hrs:   BP Temp Temp src Pulse Resp SpO2   07/23/22 0856 130/78 97.5 °F (36.4 °C) Oral 72 18 100 %   07/23/22 0435 (!) 109/55 97.3 °F (36.3 °C) Oral 71 18 100 % 07/23/22 0255 -- -- -- -- 18 --       Intake/Output Summary (Last 24 hours) at 7/23/2022 0913  Last data filed at 7/23/2022 0435  Gross per 24 hour   Intake 240 ml   Output 605 ml   Net -365 ml       Review of Systems   Constitutional:  Negative for chills, fatigue and fever. Respiratory:  Positive for shortness of breath. Negative for cough. Cardiovascular:  Negative for chest pain. Gastrointestinal:  Positive for blood in stool. Negative for abdominal pain. Neurological:  Negative for light-headedness and headaches. Physical Exam  Cardiovascular:      Rate and Rhythm: Normal rate. Rhythm irregular. Pulses: Normal pulses. Heart sounds: Normal heart sounds. Pulmonary:      Effort: Pulmonary effort is normal.      Breath sounds: Normal breath sounds. Abdominal:      General: Abdomen is flat. Palpations: Abdomen is soft. Tenderness: There is no abdominal tenderness. Neurological:      Mental Status: He is alert and oriented to person, place, and time. LABS:    CBC:   Recent Labs     07/22/22  1537 07/23/22  0143 07/23/22 0724   WBC 16.6*  --  11.6*   HGB 10.7* 9.1*  9.0* 8.9*   HCT 32.6* 28.1*  27.9* 27.2*     --  182   MCV 94.9  --  94.8     Renal:    Recent Labs     07/22/22 1537      K 4.5      CO2 19*   BUN 60*   CREATININE 1.3   GLUCOSE 175*   CALCIUM 9.4   ANIONGAP 13     Hepatic: No results for input(s): AST, ALT, BILITOT, BILIDIR, PROT, LABALBU, ALKPHOS in the last 72 hours. Troponin: No results for input(s): TROPONINI in the last 72 hours. BNP: No results for input(s): BNP in the last 72 hours. Lipids: No results for input(s): CHOL, HDL in the last 72 hours. Invalid input(s): LDLCALCU, TRIGLYCERIDE  ABGs:  No results for input(s): PHART, PAR5DSF, PO2ART, HEB9OZR, BEART, THGBART, U1SKWSAJ, GMF7IOQ in the last 72 hours.     INR:   Recent Labs     07/22/22  1537 07/23/22 0724   INR 3.36* 4.03*     Lactate: No results for input(s):

## 2022-07-24 VITALS
WEIGHT: 197.5 LBS | BODY MASS INDEX: 25.35 KG/M2 | DIASTOLIC BLOOD PRESSURE: 79 MMHG | HEIGHT: 74 IN | TEMPERATURE: 98 F | HEART RATE: 69 BPM | RESPIRATION RATE: 18 BRPM | OXYGEN SATURATION: 99 % | SYSTOLIC BLOOD PRESSURE: 128 MMHG

## 2022-07-24 LAB
ANION GAP SERPL CALCULATED.3IONS-SCNC: 7 MMOL/L (ref 3–16)
BASOPHILS ABSOLUTE: 0 K/UL (ref 0–0.2)
BASOPHILS RELATIVE PERCENT: 0.3 %
BUN BLDV-MCNC: 35 MG/DL (ref 7–20)
CALCIUM SERPL-MCNC: 8.6 MG/DL (ref 8.3–10.6)
CHLORIDE BLD-SCNC: 108 MMOL/L (ref 99–110)
CO2: 27 MMOL/L (ref 21–32)
CREAT SERPL-MCNC: 1.3 MG/DL (ref 0.8–1.3)
EOSINOPHILS ABSOLUTE: 0.1 K/UL (ref 0–0.6)
EOSINOPHILS RELATIVE PERCENT: 0.7 %
GFR AFRICAN AMERICAN: >60
GFR NON-AFRICAN AMERICAN: 54
GLUCOSE BLD-MCNC: 126 MG/DL (ref 70–99)
HCT VFR BLD CALC: 26.8 % (ref 40.5–52.5)
HEMOGLOBIN: 8.8 G/DL (ref 13.5–17.5)
INR BLD: 1.43 (ref 0.87–1.14)
LYMPHOCYTES ABSOLUTE: 1.6 K/UL (ref 1–5.1)
LYMPHOCYTES RELATIVE PERCENT: 17.6 %
MAGNESIUM: 1.9 MG/DL (ref 1.8–2.4)
MCH RBC QN AUTO: 31.6 PG (ref 26–34)
MCHC RBC AUTO-ENTMCNC: 32.7 G/DL (ref 31–36)
MCV RBC AUTO: 96.6 FL (ref 80–100)
MONOCYTES ABSOLUTE: 0.9 K/UL (ref 0–1.3)
MONOCYTES RELATIVE PERCENT: 9.5 %
NEUTROPHILS ABSOLUTE: 6.5 K/UL (ref 1.7–7.7)
NEUTROPHILS RELATIVE PERCENT: 71.9 %
PDW BLD-RTO: 14.5 % (ref 12.4–15.4)
PLATELET # BLD: 181 K/UL (ref 135–450)
PMV BLD AUTO: 7.9 FL (ref 5–10.5)
POTASSIUM SERPL-SCNC: 4.2 MMOL/L (ref 3.5–5.1)
PROTHROMBIN TIME: 17.4 SEC (ref 11.7–14.5)
RBC # BLD: 2.78 M/UL (ref 4.2–5.9)
SODIUM BLD-SCNC: 142 MMOL/L (ref 136–145)
WBC # BLD: 9.1 K/UL (ref 4–11)

## 2022-07-24 PROCEDURE — 85025 COMPLETE CBC W/AUTO DIFF WBC: CPT

## 2022-07-24 PROCEDURE — 80048 BASIC METABOLIC PNL TOTAL CA: CPT

## 2022-07-24 PROCEDURE — 6370000000 HC RX 637 (ALT 250 FOR IP): Performed by: STUDENT IN AN ORGANIZED HEALTH CARE EDUCATION/TRAINING PROGRAM

## 2022-07-24 PROCEDURE — 2580000003 HC RX 258: Performed by: STUDENT IN AN ORGANIZED HEALTH CARE EDUCATION/TRAINING PROGRAM

## 2022-07-24 PROCEDURE — A4216 STERILE WATER/SALINE, 10 ML: HCPCS | Performed by: STUDENT IN AN ORGANIZED HEALTH CARE EDUCATION/TRAINING PROGRAM

## 2022-07-24 PROCEDURE — C9113 INJ PANTOPRAZOLE SODIUM, VIA: HCPCS | Performed by: STUDENT IN AN ORGANIZED HEALTH CARE EDUCATION/TRAINING PROGRAM

## 2022-07-24 PROCEDURE — 6360000002 HC RX W HCPCS: Performed by: STUDENT IN AN ORGANIZED HEALTH CARE EDUCATION/TRAINING PROGRAM

## 2022-07-24 PROCEDURE — 36415 COLL VENOUS BLD VENIPUNCTURE: CPT

## 2022-07-24 PROCEDURE — 83735 ASSAY OF MAGNESIUM: CPT

## 2022-07-24 PROCEDURE — 85610 PROTHROMBIN TIME: CPT

## 2022-07-24 RX ORDER — PANTOPRAZOLE SODIUM 40 MG/1
40 TABLET, DELAYED RELEASE ORAL
Qty: 60 TABLET | Refills: 0 | Status: SHIPPED | OUTPATIENT
Start: 2022-07-24

## 2022-07-24 RX ADMIN — AMLODIPINE BESYLATE 5 MG: 5 TABLET ORAL at 08:32

## 2022-07-24 RX ADMIN — SODIUM CHLORIDE, PRESERVATIVE FREE 40 MG: 5 INJECTION INTRAVENOUS at 08:32

## 2022-07-24 RX ADMIN — SOTALOL HYDROCHLORIDE 40 MG: 80 TABLET ORAL at 08:32

## 2022-07-24 ASSESSMENT — ENCOUNTER SYMPTOMS
BLOOD IN STOOL: 1
ABDOMINAL PAIN: 0
SHORTNESS OF BREATH: 1
COUGH: 0

## 2022-07-24 ASSESSMENT — PAIN SCALES - WONG BAKER
WONGBAKER_NUMERICALRESPONSE: 0

## 2022-07-24 NOTE — PROGRESS NOTES
Progress Note    Admit Date: 7/22/2022  Day: 2  Diet: ADULT DIET; Regular; Low Fat/Low Chol/High Fiber/LIZETH    CC: melena    Interval history:   -EGD performed yesterday with multiple small non-bleeding gastric antral ulcers.   -Hgb 8.6 from 8.9.  -scheduled to see his cardiologist this week    HPI: Liliam Sherwood  is a 67y.o. year old male w/PMH chronic a.fibrillation (on warfarin), nonrheumatic mitral valve regurgitation, gastric ulcer (previously on ranitidine), HTN, VERA (on CPAP), BPH, internal hemmorhoids, and diffuse non-Hodgkin large cell lymphoma (2011) in remission who presented to the ED w/cc rectal bleeding. The rectal bleeding began on Wednesday night to early Thursday after eating at out on Wednesday. Patient reports having bloody red diarrhea with subsequent solid black stools. He reports that he had an associated SOB on exertion which occurs after 4-6 steps and contacted his cardiologist. Patient reports that this is new for him and he usually exercises 3/week w/intentional weight loss. He has associated nausea and had his last melanotic stool on the morning of the day of presentation. He denies any associated vomiting. AT the ED patient was hemodynamically stable. He presented w/INR at 3.36, anemia with Hb at 10.7, and w/elevated BUN at 60. Patient was hyperglycemic at 175, and ECG was consistent with atrial fibrillation with rate controlled.       Medications:     Scheduled Meds:   amLODIPine  5 mg Oral Daily    sotalol  40 mg Oral BID    sodium chloride flush  5-40 mL IntraVENous 2 times per day    pantoprazole (PROTONIX) 40 mg injection  40 mg IntraVENous Q12H     Continuous Infusions:   sodium chloride       PRN Meds:benzocaine, sodium chloride flush, sodium chloride, acetaminophen **OR** acetaminophen    Objective:   Vitals:   T-max:  Patient Vitals for the past 8 hrs:   BP Temp Temp src Pulse Resp SpO2 Weight   07/24/22 0456 -- -- -- -- -- -- 197 lb 8 oz (89.6 kg)   07/24/22 0437 135/76 98 °F (36.7 °C) Oral 68 20 100 % --         Intake/Output Summary (Last 24 hours) at 7/24/2022 0749  Last data filed at 7/24/2022 0434  Gross per 24 hour   Intake 1050 ml   Output 825 ml   Net 225 ml         Review of Systems   Constitutional:  Negative for chills, fatigue and fever. Respiratory:  Positive for shortness of breath. Negative for cough. Cardiovascular:  Negative for chest pain. Gastrointestinal:  Positive for blood in stool. Negative for abdominal pain. Neurological:  Negative for light-headedness and headaches. Physical Exam  Cardiovascular:      Rate and Rhythm: Normal rate. Rhythm irregular. Pulses: Normal pulses. Heart sounds: Normal heart sounds. Pulmonary:      Effort: Pulmonary effort is normal.      Breath sounds: Normal breath sounds. Abdominal:      General: Abdomen is flat. Palpations: Abdomen is soft. Tenderness: There is no abdominal tenderness. Neurological:      Mental Status: He is alert and oriented to person, place, and time. LABS:    CBC:   Recent Labs     07/22/22  1537 07/23/22  0143 07/23/22  0724 07/23/22  2318   WBC 16.6*  --  11.6*  --    HGB 10.7* 9.1*  9.0* 8.9* 8.6*   HCT 32.6* 28.1*  27.9* 27.2* 25.6*     --  182  --    MCV 94.9  --  94.8  --        Renal:    Recent Labs     07/22/22  1537 07/23/22  0724    146*   K 4.5 4.1    111*   CO2 19* 24   BUN 60* 47*   CREATININE 1.3 1.3   GLUCOSE 175* 116*   CALCIUM 9.4 8.9   MG  --  1.90   ANIONGAP 13 11       Hepatic: No results for input(s): AST, ALT, BILITOT, BILIDIR, PROT, LABALBU, ALKPHOS in the last 72 hours. Troponin: No results for input(s): TROPONINI in the last 72 hours. BNP: No results for input(s): BNP in the last 72 hours. Lipids: No results for input(s): CHOL, HDL in the last 72 hours. Invalid input(s): LDLCALCU, TRIGLYCERIDE  ABGs:  No results for input(s): PHART, JVY9RHQ, PO2ART, MPC4HGX, BEART, THGBART, H6GPKRRH, HRW4EFD in the last 72 hours. INR:   Recent Labs     07/22/22  1537 07/23/22  0724   INR 3.36* 4.03*       Lactate: No results for input(s): LACTATE in the last 72 hours. Cultures:  -----------------------------------------------------------------  RAD:   No orders to display       Assessment/Plan:     Stuart Land  is a 67y.o. year old male w/PMH chronic a.fibrillation (on warfarin), nonrheumatic mitral valve regurgitation, gastric ulcer (previously on ranitidine), HTN, VERA (on CPAP), BPH, internal hemmorhoids, and diffuse non-Hodgkin large cell lymphoma (2011) in remission who presented to the ED w/cc rectal bleeding. ABLA 2/2 GIB- Given melenic stool and supratherapeutic INR (4.03), likely 2/2 to GI Bleed.  Gastric ulcers noted on EGD  - Hb at 8.9  - Hold AC  - Obtain Iron Studies + reticulocyte count  - Trend H&H  - Consult GI  - NPO for possible EGD  - IV Protonix    Afib - Patient normally treated with warfarin and sotalol  - Hold warfarin because of Anemia  - Give sotalol  - Continuous Tele    HTN  - Continue home amlodipine    VERA  - On nightly CPAP        Code Status: Full Code  FEN: NPO  PPX: Protonix  DISPO: Eugenio Degroot MD, PGY-2  07/24/22  7:49 AM    This patient has been staffed and discussed with Anneliese Fuchs MD.

## 2022-07-24 NOTE — PLAN OF CARE
D: No c/o dizziness while amb to BR with stand by assist last pm to brush teeth. Had steady gait during amb. Remains on Protonix IV see mar.   A: Cont to monitor during hourly rounds    Problem: Safety - Adult  Goal: Free from fall injury  Outcome: Progressing     Problem: ABCDS Injury Assessment  Goal: Absence of physical injury  Outcome: Progressing

## 2022-07-24 NOTE — PROGRESS NOTES
[facility]  -  for Dr. Abbe Arrieta    VSS  Hgb stable  No further gross bleeding  INR 1.43 after IV vitamin K  Discharge planned for today    REC:  Pantoprazole, 40 mg qd - he should be on acid suppression indefinitely given his recurrent history of PUD and the ongoing need for anticoagulation  He may transition from Coumadin to Eliquis  If necessary, OK to resume Coumadin tomorrow vs starting on Eliquis  He will f/u with Dr. Abbe Arrieta in the office in 1 month and should undergo an EGD in 3 months to document ulcer healing

## 2022-07-24 NOTE — DISCHARGE INSTRUCTIONS
-Please take your new medication protonix 40 mg twice a day  -Please hold Warfarin for today and continue it starting tomorrow  -Please follow up with your outpatient physician

## 2022-07-24 NOTE — CARE COORDINATION
Case Management Assessment            Discharge Note                    Date / Time of Note: 7/24/2022 11:42 AM                  Discharge Note Completed by: SEBASTIAN Simon    Patient Name: Fanta Naranjo   YOB: 1950  Diagnosis: Melena [K92.1]  Upper GI bleed [K92.2]  Elevated INR [R79.1]   Date / Time: 7/22/2022  3:08 PM    Current PCP: Jennifer Mata MD  Clinic patient: No    Hospitalization in the last 30 days: No    Advance Directives:  Code Status: Full Code  [place] DNR form completed and on chart: Not Indicated    Financial:  Payor: MEDICARE / Plan: MEDICARE PART A AND B / Product Type: *No Product type* /      Pharmacy:    [facility] [id number] Danna Watts [address] [place] [address] [place] [address]   Phone: [phone] Fax: [phone]      Assistance purchasing medications?: Potential Assistance Purchasing Medications: No  Assistance provided by Case Management: None at this time    Does patient want to participate in local refill/ meds to beds program?:      Meds To Beds General Rules:  1. Can ONLY be done Monday- Friday between 8:30am-5pm  2. Prescription(s) must be in pharmacy by 3pm to be filled same day  3. Copy of patient's insurance/ prescription drug card and patient face sheet must be sent along with the prescription(s)  4. Cost of Rx cannot be added to hospital bill. If financial assistance is needed, please contact unit  or ;  or  CANNOT provide pharmacy voucher for patients co-pays  5.  Patients can then  the prescription on their way out of the hospital at discharge, or pharmacy can deliver to the bedside if staff is available. (payment due at time of pick-up or delivery - cash, check, or card accepted)     Able to afford home medications/ co-pay costs: Yes    ADLS:  Current PT AM-PAC Score:   /24  Current OT AM-PAC Score: /24      DISCHARGE Disposition: Home- No Services Needed    LOC at discharge: Not Applicable  DARIEL Completed: Not Indicated    Notification completed in HENS/PAS?:  Not Applicable    IMM Completed:   Not Indicated    Transportation:  Transportation PLAN for discharge: family - wife to transport  Mode of Transport: Private Car  Reason for medical transport: Not Applicable  Name of Transport Company: Not Applicable  Time of Transport: n/a    Transport form completed: Not Indicated    Home Care:  [address] ordered at discharge: Not [address]: Not Applicable  Orders faxed: No    Durable Medical Equipment:  DME Provider: none  Equipment obtained during hospitalization: none    Home Oxygen and Respiratory Equipment:  Oxygen needed at discharge?: Not [address]: Not Applicable  Portable tank available for discharge?: Not Indicated    Dialysis:  Dialysis patient: No    Dialysis Center:  Not Applicable    Referrals made at [facility] for outpatient continued care:  Not Applicable    Additional CM Notes: Patient from home with wife. Patient discharge plan is home with wife. Nurse confirms patient reports no CM needs at discharge. Patient's wife will provide transportation home. The Plan for Transition of Care is related to the following treatment goals of Melena [K92.1]  Upper GI bleed [K92.2]  Elevated INR [R79.1]    The Patient and/or patient representative Janeth Woodard and his family were provided with a choice of provider and agrees with the discharge plan Yes    Freedom of choice list was provided with basic dialogue that supports the patient's individualized plan of care/goals and shares the quality data associated with the providers. Yes    Care Transitions patient: No    SEBASTIAN Zapata  [facility]  Case Management Department  Ph: [phone]  Fax: [phone].

## 2022-07-25 ENCOUNTER — CARE COORDINATION (OUTPATIENT)
Dept: CASE MANAGEMENT | Age: 72
End: 2022-07-25

## 2022-07-25 NOTE — CARE COORDINATION
[facility] Transitions Initial Follow Up Call    Call within 2 business days of discharge: Yes    Patient:  Mikey Sheridan Patient :  1950  MRN:  [id number]   Reason for Admission:   GIB  Discharge Date:  22  RARS: 12      Transitions of Care Initial Call    Was this an external facility discharge? NO    Discharge Facility: [facility].      Challenges to be reviewed by the provider   Additional needs identified to be addressed with provider:    no    AMB CC Provider Discharge Needs: none            CTC attempt to reach Pt regarding recent hospital discharge. CTC left voice recording with call back number requesting a call back. Follow up appointments:    Future Appointments   Date Time Provider Lidia Donaldson   2022 10:45 AM MD Diane Branch Card GABRIELLE   8/10/2022 10:45 AM Srinivasa Umaña MM [facility]   12/15/2022 10:40 AM MD HUMERA Garnett [facility]       Alberto Patel Officer, BSN, RN  Care Transition Coordinator  Contact Number:  [phone]

## 2022-07-25 NOTE — DISCHARGE SUMMARY
INTERNAL MEDICINE DEPARTMENT AT [address] [place] [address]  DISCHARGE SUMMARY    Patient ID: Pavan Bar                                             Discharge Date: 7/25/2022   Patient's PCP: Eh Casarez MD                                          Discharge Physician: Marquez Ramey MD MD  Admit Date: 7/22/2022   Admitting Physician: Adam Dockery MD    PROBLEMS DURING HOSPITALIZATION:  Present on Admission:   Upper GI bleed      DISCHARGE DIAGNOSES:    HPI: Pavan Bar  is a 67y.o. year old male w/PMH chronic a.fibrillation (on warfarin), nonrheumatic mitral valve regurgitation, gastric ulcer (previously on ranitidine), HTN, VERA (on CPAP), BPH, internal hemmorhoids, and diffuse non-Hodgkin large cell lymphoma (2011) in remission who presented to the ED w/cc rectal bleeding. The rectal bleeding began on Wednesday night to early Thursday after eating at out on Wednesday. Patient reports having bloody red diarrhea with subsequent solid black stools. He reports that he had an associated SOB on exertion which occurs after 4-6 steps and contacted his cardiologist. Patient reports that this is new for him and he usually exercises 3/week w/intentional weight loss. He has associated nausea and had his last melanotic stool on the morning of the day of presentation. He denies any associated vomiting. AT the ED patient was hemodynamically stable. He presented w/INR at 3.36, anemia with Hb at 10.7, and w/elevated BUN at 60. Patient was hyperglycemic at 175, and ECG was consistent with atrial fibrillation with rate controlled. The following issues were addressed during hospitalization:    1) Anemia 2/2 probable GI bleed - We monitored the patient's hemoglobin and performed an EGD to look for the site of the bleeding. The patient also had a supra-therapeutic INR (international normalized ratio), so we reversed it a bit by giving the patient vitamin K. Physical Exam:  Cardiovascular:     Rate and Rhythm: Normal rate. Rhythm irregular. Pulses: Normal pulses. Heart sounds: Normal heart sounds. Pulmonary:     Effort: Pulmonary effort is normal.     Breath sounds: Normal breath sounds. Abdominal:     General: Abdomen is flat. Palpations: Abdomen is soft. Tenderness: There is no abdominal tenderness. Neurological:     Mental Status: He is alert and oriented to person, place, and time. Consults: GI  Significant Diagnostic Studies:  EGD  Treatments: protonix; sotalol; amlodipine  Disposition: home  Discharged Condition: Stable  Follow Up: GI in one month    DISCHARGE MEDICATION:       Medication List        START taking these medications      pantoprazole 40 MG tablet  Commonly known as: PROTONIX  Take 1 tablet by mouth in the morning and 1 tablet in the evening. Take before meals. CONTINUE taking these medications      amLODIPine 5 MG tablet  Commonly known as: NORVASC  Take 1 tablet by mouth daily     sotalol 80 MG tablet  Commonly known as: BETAPACE     warfarin 5 MG tablet  Commonly known as: COUMADIN  Take as directed. If you are unsure how to take this medication, talk to your nurse or doctor.   Original instructions: TAKE 1/2 TABLET BY MOUTH DAILY ON MONDAYS AND FRIDAYS AND TAKE ONE TABLET BY MOUTH DAILY ON ALL OTHER DAYS               Where to Get Your Medications        These medications were sent to [facility] [id number] [facility], [address] [address] [phone]  . [address] [facility]      Phone: [phone]   pantoprazole 40 MG tablet          Activity: activity as tolerated  Diet: cardiac diet  Wound Care: none needed    Time Spent on discharge is more than 15 minutes    Signed:  Rosa M Nichols MD,  PGY-1  7/25/2022

## 2022-07-26 ENCOUNTER — CARE COORDINATION (OUTPATIENT)
Dept: CASE MANAGEMENT | Age: 72
End: 2022-07-26

## 2022-07-26 NOTE — CARE COORDINATION
[facility] Transitions Initial Follow Up Call    Call within 2 business days of discharge: Yes    Patient:  Gena January Patient :  1950  MRN:  [id number]   Reason for Admission:  melena  Discharge Date:  22  RARS: 12      Transitions of Care Initial Call    Was this an external facility discharge?  no    Discharge Facility: [facility]      Challenges to be reviewed by the provider   Additional needs identified to be addressed with provider:    yes - HFU needed - PCP pool    AMB CC Provider Discharge Needs: none            CTC attempt to reach Pt regarding recent hospital discharge. CTC left voice recording with call back number requesting a call back. CT episode closed / unable to reach. Message to PCP / HFU needed. Follow up appointments:    Future Appointments   Date Time Provider Lidia Donaldson   2022 10:45 AM MD Diane Wild [facility]   8/10/2022 10:45 AM Srinivasa [facility] [place] [facility]   12/15/2022 10:40 AM MD HUMERA Spence [facility]       SOILA Sawyer, RN  Care Transition Coordinator  Contact Number:  [phone]

## 2022-07-27 ENCOUNTER — OFFICE VISIT (OUTPATIENT)
Dept: CARDIOLOGY CLINIC | Age: 72
End: 2022-07-27
Payer: MEDICARE

## 2022-07-27 VITALS
SYSTOLIC BLOOD PRESSURE: 124 MMHG | BODY MASS INDEX: 27.68 KG/M2 | DIASTOLIC BLOOD PRESSURE: 70 MMHG | HEART RATE: 90 BPM | WEIGHT: 215.6 LBS

## 2022-07-27 DIAGNOSIS — I34.0 MITRAL VALVE INSUFFICIENCY, UNSPECIFIED ETIOLOGY: ICD-10-CM

## 2022-07-27 DIAGNOSIS — R00.2 PALPITATIONS: ICD-10-CM

## 2022-07-27 DIAGNOSIS — I48.20 CHRONIC ATRIAL FIBRILLATION (HCC): Primary | ICD-10-CM

## 2022-07-27 PROBLEM — N18.30 CHRONIC RENAL DISEASE, STAGE III (HCC): Status: ACTIVE | Noted: 2022-07-27

## 2022-07-27 PROCEDURE — 99214 OFFICE O/P EST MOD 30 MIN: CPT | Performed by: INTERNAL MEDICINE

## 2022-07-27 PROCEDURE — 1123F ACP DISCUSS/DSCN MKR DOCD: CPT | Performed by: INTERNAL MEDICINE

## 2022-07-27 NOTE — PROGRESS NOTES
Subjective:      Patient ID: Solitario Canas is a 67 y.o. male. HPI Abril Rodas is here today for follow up afib/palp/mr. No complaints except had arrhythmia when had cold. Otherwise rhythm stable. Continues exercising. Walking and gym every other day. [employer] Rhythm good. No sob. No edema. No pnd or orthopnea. No syncope. Wt down.        Past Medical History:   Diagnosis Date    AF (atrial fibrillation) (HCC)     Aortic valve disease     leaking no problems    Lymphoma ([address]      Past Surgical History:   Procedure Laterality Date    APPENDECTOMY      COLONOSCOPY  8/18/2006     Dr. Shari Hdz - internal hemorrhoids , hyperplastic polyp     CYSTOSCOPY  1-11-10     Dr. Latha Galarza ureteral stent     LYMPH NODE BIOPSY  5/11/11    RIGHT CERVICAL    LYMPH NODE DISSECTION N/A 2011    TONSILLECTOMY      UPPER GASTROINTESTINAL ENDOSCOPY N/A 7/23/2022    EGD DIAGNOSTIC ONLY performed by Christi Dozier MD at [facility] ENDOSCOPY         Allergies   Allergen Reactions    Gentamicin Other (See Comments)     Dad had reaction        Social History     Socioeconomic History    Marital status:      Spouse name: Not on file    Number of children: 0    Years of education: Not on file    Highest education level: Not on file   Occupational History    Occupation: retired   Tobacco Use    Smoking status: Never    Smokeless tobacco: Never   Vaping Use    Vaping Use: Never used   Substance and Sexual Activity    Alcohol use: No     Alcohol/week: 0.0 standard drinks    Drug use: No    Sexual activity: Yes     Partners: Female   Other Topics Concern    Not on file   Social History Narrative    Not on file     Social Determinants of Health     Financial Resource Strain: Not on file   Food Insecurity: Not on file   Transportation Needs: Not on file   Physical Activity: Not on file   Stress: Not on file   Social Connections: Not on file   Intimate Partner Violence: Not on file   Housing Stability: Not on file        Patient has a family history includes Heart Disease in his mother. Patient  has a past medical history of AF (atrial fibrillation) ([address] Aortic valve disease, and Lymphoma ([address] Current Outpatient Medications   Medication Sig Dispense Refill    pantoprazole (PROTONIX) 40 MG tablet Take 1 tablet by mouth in the morning and 1 tablet in the evening. Take before meals. 60 tablet 0    sotalol (BETAPACE) 80 MG tablet Take 40 mg by mouth in the morning and 40 mg before bedtime. amLODIPine (NORVASC) 5 MG tablet Take 1 tablet by mouth daily 30 tablet 3    warfarin (COUMADIN) 5 MG tablet TAKE 1/2 TABLET BY MOUTH DAILY ON MONDAYS AND FRIDAYS AND TAKE ONE TABLET BY MOUTH DAILY ON ALL OTHER DAYS 90 tablet 3     No current facility-administered medications for this visit. Vitals:    07/27/22 1043   BP: 124/70   Pulse: 90     Wt 215    Review of Systems   Constitutional: Negative for activity change and fatigue. Respiratory: Negative for apnea, cough, choking, chest tightness and shortness of breath. Cardiovascular: Negative for chest pain, palpitations and leg swelling. No PND or orthopnea. No tachycardia. Gastrointestinal: Negative for abdominal distention. Musculoskeletal: Negative for myalgias. Neurological: Negative for dizziness, syncope and light-headedness. Psychiatric/Behavioral: Negative for behavioral problems, confusion and agitation. All other systems reviewed negative as done. Objective:   Physical Exam   Constitutional: He is oriented to person, place, and time. He appears well-developed and well-nourished. No distress. HENT:   Head: Normocephalic and atraumatic. Eyes: Conjunctivae and EOM are normal. Right eye exhibits no discharge. Left eye exhibits no discharge. Neck: Normal range of motion. Neck supple. No JVD present. Cardiovascular:  irreg/irregRate rhythm, S1 normal, S2 normal and normal heart sounds. Exam reveals no gallop.     Pulses:       Radial pulses are 2+ on the right side, and 2+ on the left side. Pulmonary/Chest: Effort normal and breath sounds normal. No respiratory distress. He has no wheezes. He has no rales. Abdominal: Soft. Bowel sounds are normal. No tenderness. Musculoskeletal: Normal range of motion. He exhibits no edema. Tr-+ R  Neurological: He is alert and oriented to person, place, and time. Skin: Skin is warm and dry. Psychiatric: He has a normal mood and affect. His behavior is normal. Thought content normal.       Assessment:         Diagnosis Orders   1. Chronic atrial fibrillation (HCC)        2. Palpitations        3. Mitral valve insufficiency, unspecified etiology                  Plan:      CV stable. GI bleed. On coumadin. Active. Rhythm stable outside of vaccine. HR normal.   Continues exercising. On AC. No bleeding issues. GI/hospitalist recommended eliquis   Will change to 2.5 mg bid. Continue norvasc. Start eliquis. Stop coumadin. Remains compensated. No changes. Reviewed previous records and testing including echo 7/20. Continue to monitor. Follow up 4 months.

## 2022-07-28 ENCOUNTER — TELEPHONE (OUTPATIENT)
Dept: CARDIOLOGY CLINIC | Age: 72
End: 2022-07-28

## 2022-07-28 NOTE — TELEPHONE ENCOUNTER
Pt needs prescription for Eliquis 2.5 mg faxed over to the pharmacy.       Pharmacy    [facility] [id number] [facility] [address]   [address] [place] [address]   Phone:  [phone]  Fax:  [phone]

## 2022-08-08 ENCOUNTER — TELEPHONE (OUTPATIENT)
Dept: PHARMACY | Age: 72
End: 2022-08-08

## 2022-08-08 ENCOUNTER — ANTI-COAG VISIT (OUTPATIENT)
Dept: PHARMACY | Age: 72
End: 2022-08-08

## 2022-08-08 DIAGNOSIS — I48.20 CHRONIC ATRIAL FIBRILLATION (HCC): ICD-10-CM

## 2022-08-08 DIAGNOSIS — Z79.01 LONG TERM CURRENT USE OF ANTICOAGULANT THERAPY: Primary | ICD-10-CM

## 2022-08-08 NOTE — TELEPHONE ENCOUNTER
Patient called [facility] to report that he was switched from warfarin to Eliquis by his cardiologist. Patient was hospitalized at [facility] from 7/22-7/24 for upper GI bleed. INR was supratherapeutic (3.36), so vitamin K was given to reverse INR. EGD showed multiple small, non-bleeding gastric antral ulcers. GI recommended taking PPI indefinitely and switching from warfarin to Eliquis. Patient saw Dr. Barrie Braun (cardiology) on 7/27, who discontinued warfarin and initiated Eliquis 2.5 mg BID. Patient states today that he is taking the Eliquis and tolerating it. No further bleeding reported. Discussed the following with patient today:  -Eliquis dose, frequency, duration  -INR monitoring and dose adjustments no longer necessary with Eliquis  -Diet modifications (vitamin K intake) no longer necessary with Eliquis  -Risk of bleeding and how to monitor, when to seek emergency medical attention    Patient will be discharged from [place] [address] as INR monitoring is no longer necessary. Patient was encouraged to call with any questions/concerns. Mauricio Ayala.  Jesica AlejandraD, BCPS  [facility]  Ph: [phone]  8/8/2022 12:03 PM

## 2022-08-29 RX ORDER — AMLODIPINE BESYLATE 5 MG/1
TABLET ORAL
Qty: 30 TABLET | Refills: 3 | Status: SHIPPED | OUTPATIENT
Start: 2022-08-29

## 2022-09-17 NOTE — ASSESSMENT & PLAN NOTE
80 Chronic- stable. After speaking with patient:    Agree with current plan, and would agree to continue this plan per prescribing and managing physician.

## 2022-09-20 NOTE — PROGRESS NOTES
-ENDOSCOPY PREOP INSTRUCTIONS      You are scheduled for a procedure at [facility] on 9-21 @ 1230. You will need to arrival by: 1100 (at least an hour & a half prior to planned start time)  Report to the MAIN entrance on [address] and register at the surgery center on the left-hand side of the lobby  You will need your insurance card and photo id    For your procedure:     PLEASE FOLLOW ALL INSTRUCTIONS & PREPS GIVEN TO YOU BY YOUR DOCTOR'S OFFICE. If you have not received these instructions yet, please call the office immediately. Make sure to read them as soon as received. Bring an accurate list of any medications, including the dose/ frequency, with you on the day of the procedure. Make sure to include over the counter medications. If you are taking blood thinners, Aspirin or diabetic medication, make sure to call your doctor as soon as possible for instructions prior to your procedure. Please dress comfortably and do not wear any lotion, powders or jewelry  Arrange for someone to be with you and sign you out & drive you home after your procedure. We allow 2 adults visitors with you in the hospital & everyone is required to wear a mask.     WOMEN ONLY OF CHILDBEARING AGE: Please make sure to be able to give a urine sample on arrival      If you have further questions, you may contact your Endoscopist's office or Pre Admission Testing staff at [phone]

## 2022-09-21 ENCOUNTER — HOSPITAL ENCOUNTER (OUTPATIENT)
Age: 72
Setting detail: OUTPATIENT SURGERY
Discharge: HOME | End: 2022-09-21
Attending: INTERNAL MEDICINE | Admitting: INTERNAL MEDICINE
Payer: MEDICARE

## 2022-09-21 VITALS
OXYGEN SATURATION: 98 % | SYSTOLIC BLOOD PRESSURE: 123 MMHG | RESPIRATION RATE: 16 BRPM | WEIGHT: 217 LBS | HEIGHT: 73 IN | HEART RATE: 67 BPM | DIASTOLIC BLOOD PRESSURE: 63 MMHG | BODY MASS INDEX: 28.76 KG/M2 | TEMPERATURE: 97.7 F

## 2022-09-21 DIAGNOSIS — K25.9 GASTRIC ULCER, UNSPECIFIED CHRONICITY, UNSPECIFIED WHETHER GASTRIC ULCER HEMORRHAGE OR PERFORATION PRESENT: ICD-10-CM

## 2022-09-21 PROCEDURE — 88342 IMHCHEM/IMCYTCHM 1ST ANTB: CPT

## 2022-09-21 PROCEDURE — 3609012400 HC EGD TRANSORAL BIOPSY SINGLE/MULTIPLE: Performed by: INTERNAL MEDICINE

## 2022-09-21 PROCEDURE — 99152 MOD SED SAME PHYS/QHP 5/>YRS: CPT | Performed by: INTERNAL MEDICINE

## 2022-09-21 PROCEDURE — 88305 TISSUE EXAM BY PATHOLOGIST: CPT

## 2022-09-21 PROCEDURE — 2580000003 HC RX 258: Performed by: INTERNAL MEDICINE

## 2022-09-21 PROCEDURE — 7100000011 HC PHASE II RECOVERY - ADDTL 15 MIN: Performed by: INTERNAL MEDICINE

## 2022-09-21 PROCEDURE — 6370000000 HC RX 637 (ALT 250 FOR IP): Performed by: INTERNAL MEDICINE

## 2022-09-21 PROCEDURE — 6360000002 HC RX W HCPCS: Performed by: INTERNAL MEDICINE

## 2022-09-21 PROCEDURE — 2709999900 HC NON-CHARGEABLE SUPPLY: Performed by: INTERNAL MEDICINE

## 2022-09-21 PROCEDURE — 7100000010 HC PHASE II RECOVERY - FIRST 15 MIN: Performed by: INTERNAL MEDICINE

## 2022-09-21 RX ORDER — MIDAZOLAM HYDROCHLORIDE 1 MG/ML
INJECTION INTRAMUSCULAR; INTRAVENOUS PRN
Status: DISCONTINUED | OUTPATIENT
Start: 2022-09-21 | End: 2022-09-21 | Stop reason: ALTCHOICE

## 2022-09-21 RX ORDER — FENTANYL CITRATE 50 UG/ML
INJECTION, SOLUTION INTRAMUSCULAR; INTRAVENOUS PRN
Status: DISCONTINUED | OUTPATIENT
Start: 2022-09-21 | End: 2022-09-21 | Stop reason: ALTCHOICE

## 2022-09-21 RX ORDER — SODIUM CHLORIDE, SODIUM LACTATE, POTASSIUM CHLORIDE, CALCIUM CHLORIDE 600; 310; 30; 20 MG/100ML; MG/100ML; MG/100ML; MG/100ML
INJECTION, SOLUTION INTRAVENOUS CONTINUOUS
Status: DISCONTINUED | OUTPATIENT
Start: 2022-09-21 | End: 2022-09-21 | Stop reason: HOSPADM

## 2022-09-21 RX ADMIN — SODIUM CHLORIDE, POTASSIUM CHLORIDE, SODIUM LACTATE AND CALCIUM CHLORIDE: 600; 310; 30; 20 INJECTION, SOLUTION INTRAVENOUS at 12:01

## 2022-09-21 ASSESSMENT — PAIN SCALES - WONG BAKER
WONGBAKER_NUMERICALRESPONSE: 0

## 2022-09-21 ASSESSMENT — PAIN - FUNCTIONAL ASSESSMENT: PAIN_FUNCTIONAL_ASSESSMENT: 0-10

## 2022-09-21 ASSESSMENT — PULMONARY FUNCTION TESTS: PIF_VALUE: 1

## 2022-09-21 ASSESSMENT — PAIN SCALES - GENERAL
PAINLEVEL_OUTOF10: 0
PAINLEVEL_OUTOF10: 0

## 2022-09-21 NOTE — DISCHARGE INSTRUCTIONS
ENDOSCOPY DISCHARGE INSTRUCTIONS:    Call the physician that did your procedure for any questions or concerns:           DR. Hanna [place]:  [phone]               ACTIVITY:    There are potential side effects to the medications used for sedation and anesthesia during your procedure. These include:  Dizziness or light-headedness, confusion or memory loss, delayed reaction times, loss of coordination, nausea and vomiting. Because of your increased risk for injury, we ask that you observe the following precautions: For the next 24 hours,  DO NOT operate an automobile, bicycle, motorcycle, , power tools or large equipment of any kind. Do not drink alcohol, sign any legal documents or make any legal decisions for 24 hours. Do not bend your head over lower than your heart. DO sit on the side of bed/couch awhile before getting up. Plan on bedrest or quiet relaxation today. You may resume normal activities in 24 hours. DIET:    Your first meal today should be light, avoiding spicy and fatty foods. If you tolerate this first meal,  then you may advance to your regular diet unless otherwise advised by your physician. NORMAL SYMPTOMS:  -Sore throat if youve had an EGD  -Gaseous discomfort    NOTIFY YOUR PHYSICIAN IF THESE SYMPTOMS OCCUR:  1. Fever (greater than 100)  5. Increased abdominal bloating  2. Severe pain    6. Excessive bleeding  3. Nausea and vomiting  7. Chest pain                                                                    4. Chills    8. Shortness of breath      ADDITIONAL INSTRUCTIONS:    Biopsy results:  WILL CALL YOU IN 1 WEEK WITH BIOPSY RESULTS. Educational Information:    Follow up appointment:                               Please review these discharge instructions this evening or tomorrow for   information you may have forgotten. We want to thank you for choosing the [facility] as your health care provider.  We always strive to provide you with excellent care while you are here. You may receive a survey in the mail regarding your care. We would appreciate you taking a few minutes of your time to complete this survey.  Again, thank you for choosing the [facility]

## 2022-09-21 NOTE — H&P
History and Physical / Pre-Sedation Assessment    Fantasma Gautam is a 67 y.o. male who presents today for EGD procedure. PMHx:    Past Medical History:   Diagnosis Date    AF (atrial fibrillation) ([facility] [address]     Aortic valve disease     leaking no problems    Lymphoma ([facility] [address]     B cell       Medications:    Prior to Admission medications    Medication Sig Start Date End Date Taking? Authorizing Provider   amLODIPine (NORVASC) 5 MG tablet TAKE ONE TABLET BY MOUTH DAILY 8/29/22   Raul Zuniga MD   apixaban (ELIQUIS) 2.5 MG TABS tablet Take 1 tablet by mouth in the morning and 1 tablet before bedtime. 7/28/22   Raul Zuniga MD   pantoprazole (PROTONIX) 40 MG tablet Take 1 tablet by mouth in the morning and 1 tablet in the evening. Take before meals. 7/24/22   Bernardo Hernandez MD   sotalol (BETAPACE) 80 MG tablet Take 40 mg by mouth in the morning and 40 mg before bedtime.     Historical Provider, MD       Allergies: No Known Allergies    PSHx:    Past Surgical History:   Procedure Laterality Date    APPENDECTOMY      COLONOSCOPY  8/18/2006     Dr. Eugenie Aguayo - internal hemorrhoids , hyperplastic polyp     CYSTOSCOPY  1-11-10     Dr. Chapo Mae ureteral stent     LYMPH NODE BIOPSY  5/11/11    RIGHT CERVICAL    LYMPH NODE DISSECTION N/A 2011    TONSILLECTOMY      UPPER GASTROINTESTINAL ENDOSCOPY N/A 7/23/2022    EGD DIAGNOSTIC ONLY performed by Aimee Dejesus MD at [address] [facility] Hx:    Social History     Socioeconomic History    Marital status:      Spouse name: Not on file    Number of children: 0    Years of education: Not on file    Highest education level: Not on file   Occupational History    Occupation: retired   Tobacco Use    Smoking status: Never    Smokeless tobacco: Never   Vaping Use    Vaping Use: Never used   Substance and Sexual Activity    Alcohol use: No     Alcohol/week: 0.0 standard drinks    Drug use: No    Sexual activity: Yes     Partners: Female   Other Topics Concern    Not on file   Social History Narrative    Not on file     Social Determinants of Health     Financial Resource Strain: Not on file   Food Insecurity: Not on file   Transportation Needs: Not on file   Physical Activity: Not on file   Stress: Not on file   Social Connections: Not on file   Intimate Partner Violence: Not on file   Housing Stability: Not on file       Family Hx:   Family History   Problem Relation Age of Onset    Heart Disease Mother        Physical Exam:  Vital Signs: BP (!) 149/77   Pulse 82   Temp 97.6 °F (36.4 °C) (Temporal)   Resp 12   Ht 6' 1\" (1.854 m)   Wt 217 lb (98.4 kg)   SpO2 100%   BMI 28.63 kg/m²    Pulmonary: Normal  Cardiac: Normal  Abdomen: Normal    Pre-Procedure Assessment / Plan:  ASA Classification: Class 2 - A normal healthy patient with mild systemic disease  Level of Sedation Plan: Moderate sedation   Mallampati Score: II (soft palate, uvula, fauces visible)  Post Procedure plan: Return to same level of care                  Colonoscopy Interval History:      Medical Reason for Colonoscopy before 3 years   System Reasons for Colonoscopy before 3 years: previous colonoscopy report unavailable or unable to locate    I assessed the patient and find that the patient is in satisfactory condition to proceed with the planned procedure and sedation plan. Risks/benefits/alternatives of procedure discussed with patient and any present family members. Risks including, but not limited to: bleeding, perforation, post polypectomy syndrome, splenic injury, need for additional procedures or surgery, risks of anesthesia. Patient understands it is their responsibility to call office for pathology results if they do not hear from my office within 1-2 weeks. All questions answered.     Fatou Encarnacion MD  9/21/2022

## 2022-11-09 ENCOUNTER — HOSPITAL ENCOUNTER (INPATIENT)
Age: 72
LOS: 3 days | Discharge: HOME | DRG: 193 | End: 2022-11-12
Attending: STUDENT IN AN ORGANIZED HEALTH CARE EDUCATION/TRAINING PROGRAM | Admitting: INTERNAL MEDICINE
Payer: MEDICARE

## 2022-11-09 ENCOUNTER — APPOINTMENT (OUTPATIENT)
Dept: GENERAL RADIOLOGY | Age: 72
DRG: 193 | End: 2022-11-09
Payer: MEDICARE

## 2022-11-09 DIAGNOSIS — R06.2 NOCTURNAL COUGH WITH WHEEZE: ICD-10-CM

## 2022-11-09 DIAGNOSIS — R60.0 BILATERAL LOWER EXTREMITY EDEMA: ICD-10-CM

## 2022-11-09 DIAGNOSIS — G47.33 OBSTRUCTIVE SLEEP APNEA: ICD-10-CM

## 2022-11-09 DIAGNOSIS — E87.1 HYPONATREMIA: Primary | ICD-10-CM

## 2022-11-09 DIAGNOSIS — R05.8 NOCTURNAL COUGH WITH WHEEZE: ICD-10-CM

## 2022-11-09 DIAGNOSIS — N13.39 OTHER HYDRONEPHROSIS: ICD-10-CM

## 2022-11-09 DIAGNOSIS — R53.1 WEAKNESS: ICD-10-CM

## 2022-11-09 DIAGNOSIS — R93.89 ABNORMAL CHEST X-RAY: ICD-10-CM

## 2022-11-09 LAB
ALBUMIN SERPL-MCNC: 4.1 G/DL (ref 3.4–5)
ALP BLD-CCNC: 82 U/L (ref 40–129)
ALT SERPL-CCNC: 15 U/L (ref 10–40)
AMORPHOUS: ABNORMAL /HPF
ANION GAP SERPL CALCULATED.3IONS-SCNC: 13 MMOL/L (ref 3–16)
ANION GAP SERPL CALCULATED.3IONS-SCNC: 14 MMOL/L (ref 3–16)
ANION GAP SERPL CALCULATED.3IONS-SCNC: 15 MMOL/L (ref 3–16)
AST SERPL-CCNC: 25 U/L (ref 15–37)
BACTERIA: ABNORMAL /HPF
BASE EXCESS VENOUS: -1.9 MMOL/L (ref -2–3)
BASOPHILS ABSOLUTE: 0 K/UL (ref 0–0.2)
BASOPHILS RELATIVE PERCENT: 0.4 %
BILIRUB SERPL-MCNC: 1.1 MG/DL (ref 0–1)
BILIRUBIN DIRECT: 0.5 MG/DL (ref 0–0.3)
BILIRUBIN URINE: NEGATIVE
BILIRUBIN, INDIRECT: 0.6 MG/DL (ref 0–1)
BLOOD, URINE: ABNORMAL
BUN BLDV-MCNC: 21 MG/DL (ref 7–20)
BUN BLDV-MCNC: 23 MG/DL (ref 7–20)
BUN BLDV-MCNC: 24 MG/DL (ref 7–20)
CALCIUM SERPL-MCNC: 7.8 MG/DL (ref 8.3–10.6)
CALCIUM SERPL-MCNC: 8.9 MG/DL (ref 8.3–10.6)
CALCIUM SERPL-MCNC: 9.3 MG/DL (ref 8.3–10.6)
CARBOXYHEMOGLOBIN: 1.7 % (ref 0–1.5)
CHLORIDE BLD-SCNC: 85 MMOL/L (ref 99–110)
CHLORIDE BLD-SCNC: 89 MMOL/L (ref 99–110)
CHLORIDE BLD-SCNC: 92 MMOL/L (ref 99–110)
CLARITY: CLEAR
CO2: 20 MMOL/L (ref 21–32)
CO2: 22 MMOL/L (ref 21–32)
CO2: 23 MMOL/L (ref 21–32)
COLOR: YELLOW
CREAT SERPL-MCNC: 1 MG/DL (ref 0.8–1.3)
CREAT SERPL-MCNC: 1.1 MG/DL (ref 0.8–1.3)
CREAT SERPL-MCNC: 1.2 MG/DL (ref 0.8–1.3)
EKG DIAGNOSIS: NORMAL
EKG Q-T INTERVAL: 430 MS
EKG QRS DURATION: 86 MS
EKG QTC CALCULATION (BAZETT): 493 MS
EKG R AXIS: 32 DEGREES
EKG T AXIS: 60 DEGREES
EKG VENTRICULAR RATE: 79 BPM
EOSINOPHILS ABSOLUTE: 0 K/UL (ref 0–0.6)
EOSINOPHILS RELATIVE PERCENT: 0.2 %
EPITHELIAL CELLS, UA: ABNORMAL /HPF (ref 0–5)
GFR SERPL CREATININE-BSD FRML MDRD: >60 ML/MIN/{1.73_M2}
GLUCOSE BLD-MCNC: 108 MG/DL (ref 70–99)
GLUCOSE BLD-MCNC: 124 MG/DL (ref 70–99)
GLUCOSE BLD-MCNC: 96 MG/DL (ref 70–99)
GLUCOSE URINE: NEGATIVE MG/DL
HCO3 VENOUS: 23.7 MMOL/L (ref 24–28)
HCT VFR BLD CALC: 39.3 % (ref 40.5–52.5)
HEMOGLOBIN, VEN, REDUCED: 51.4 %
HEMOGLOBIN: 12.5 G/DL (ref 13.5–17.5)
INFLUENZA A: NOT DETECTED
INFLUENZA B: NOT DETECTED
KETONES, URINE: 15 MG/DL
LEUKOCYTE ESTERASE, URINE: NEGATIVE
LYMPHOCYTES ABSOLUTE: 0.8 K/UL (ref 1–5.1)
LYMPHOCYTES RELATIVE PERCENT: 7.6 %
MCH RBC QN AUTO: 23.7 PG (ref 26–34)
MCHC RBC AUTO-ENTMCNC: 31.7 G/DL (ref 31–36)
MCV RBC AUTO: 74.6 FL (ref 80–100)
METHEMOGLOBIN VENOUS: 0.6 % (ref 0–1.5)
MICROSCOPIC EXAMINATION: YES
MONOCYTES ABSOLUTE: 1.5 K/UL (ref 0–1.3)
MONOCYTES RELATIVE PERCENT: 14.2 %
NEUTROPHILS ABSOLUTE: 8.3 K/UL (ref 1.7–7.7)
NEUTROPHILS RELATIVE PERCENT: 77.6 %
NITRITE, URINE: NEGATIVE
O2 SAT, VEN: 47 %
OSMOLALITY URINE: 614 MOSM/KG (ref 390–1070)
OSMOLALITY: 272 MOSM/KG (ref 278–305)
PCO2, VEN: 42.7 MMHG (ref 41–51)
PDW BLD-RTO: 20.6 % (ref 12.4–15.4)
PH UA: 6 (ref 5–8)
PH VENOUS: 7.35 (ref 7.35–7.45)
PLATELET # BLD: 251 K/UL (ref 135–450)
PMV BLD AUTO: 7.8 FL (ref 5–10.5)
PO2, VEN: 30.3 MMHG (ref 25–40)
POTASSIUM REFLEX MAGNESIUM: 4.1 MMOL/L (ref 3.5–5.1)
POTASSIUM REFLEX MAGNESIUM: 4.2 MMOL/L (ref 3.5–5.1)
POTASSIUM SERPL-SCNC: 3.5 MMOL/L (ref 3.5–5.1)
PRO-BNP: 4944 PG/ML (ref 0–124)
PROCALCITONIN: 0.19 NG/ML (ref 0–0.15)
PROTEIN UA: 100 MG/DL
RBC # BLD: 5.27 M/UL (ref 4.2–5.9)
RBC UA: ABNORMAL /HPF (ref 0–4)
SARS-COV-2 RNA, RT PCR: NOT DETECTED
SODIUM BLD-SCNC: 122 MMOL/L (ref 136–145)
SODIUM BLD-SCNC: 125 MMOL/L (ref 136–145)
SODIUM BLD-SCNC: 125 MMOL/L (ref 136–145)
SODIUM BLD-SCNC: 126 MMOL/L (ref 136–145)
SODIUM URINE: <20 MMOL/L
SPECIFIC GRAVITY UA: 1.02 (ref 1–1.03)
TCO2 CALC VENOUS: 25 MMOL/L
TOTAL PROTEIN: 7 G/DL (ref 6.4–8.2)
TROPONIN: <0.01 NG/ML
URINE TYPE: ABNORMAL
UROBILINOGEN, URINE: 1 E.U./DL
WBC # BLD: 10.7 K/UL (ref 4–11)
WBC UA: ABNORMAL /HPF (ref 0–5)

## 2022-11-09 PROCEDURE — 71045 X-RAY EXAM CHEST 1 VIEW: CPT

## 2022-11-09 PROCEDURE — 94761 N-INVAS EAR/PLS OXIMETRY MLT: CPT

## 2022-11-09 PROCEDURE — 80048 BASIC METABOLIC PNL TOTAL CA: CPT

## 2022-11-09 PROCEDURE — 87449 NOS EACH ORGANISM AG IA: CPT

## 2022-11-09 PROCEDURE — 2500000003 HC RX 250 WO HCPCS

## 2022-11-09 PROCEDURE — 6360000002 HC RX W HCPCS

## 2022-11-09 PROCEDURE — 84295 ASSAY OF SERUM SODIUM: CPT

## 2022-11-09 PROCEDURE — 84484 ASSAY OF TROPONIN QUANT: CPT

## 2022-11-09 PROCEDURE — 83930 ASSAY OF BLOOD OSMOLALITY: CPT

## 2022-11-09 PROCEDURE — 85025 COMPLETE CBC W/AUTO DIFF WBC: CPT

## 2022-11-09 PROCEDURE — 96367 TX/PROPH/DG ADDL SEQ IV INF: CPT

## 2022-11-09 PROCEDURE — 83880 ASSAY OF NATRIURETIC PEPTIDE: CPT

## 2022-11-09 PROCEDURE — 84443 ASSAY THYROID STIM HORMONE: CPT

## 2022-11-09 PROCEDURE — 2580000003 HC RX 258

## 2022-11-09 PROCEDURE — 2580000003 HC RX 258: Performed by: PHYSICIAN ASSISTANT

## 2022-11-09 PROCEDURE — 84300 ASSAY OF URINE SODIUM: CPT

## 2022-11-09 PROCEDURE — 6370000000 HC RX 637 (ALT 250 FOR IP)

## 2022-11-09 PROCEDURE — 94640 AIRWAY INHALATION TREATMENT: CPT

## 2022-11-09 PROCEDURE — 2060000000 HC ICU INTERMEDIATE R&B

## 2022-11-09 PROCEDURE — 87636 SARSCOV2 & INF A&B AMP PRB: CPT

## 2022-11-09 PROCEDURE — 82803 BLOOD GASES ANY COMBINATION: CPT

## 2022-11-09 PROCEDURE — 6370000000 HC RX 637 (ALT 250 FOR IP): Performed by: PHYSICIAN ASSISTANT

## 2022-11-09 PROCEDURE — 81001 URINALYSIS AUTO W/SCOPE: CPT

## 2022-11-09 PROCEDURE — 99285 EMERGENCY DEPT VISIT HI MDM: CPT

## 2022-11-09 PROCEDURE — 83540 ASSAY OF IRON: CPT

## 2022-11-09 PROCEDURE — 93005 ELECTROCARDIOGRAM TRACING: CPT | Performed by: PHYSICIAN ASSISTANT

## 2022-11-09 PROCEDURE — 80076 HEPATIC FUNCTION PANEL: CPT

## 2022-11-09 PROCEDURE — 96365 THER/PROPH/DIAG IV INF INIT: CPT

## 2022-11-09 PROCEDURE — 83550 IRON BINDING TEST: CPT

## 2022-11-09 PROCEDURE — 83935 ASSAY OF URINE OSMOLALITY: CPT

## 2022-11-09 PROCEDURE — 84145 PROCALCITONIN (PCT): CPT

## 2022-11-09 PROCEDURE — 6360000002 HC RX W HCPCS: Performed by: PHYSICIAN ASSISTANT

## 2022-11-09 PROCEDURE — 87040 BLOOD CULTURE FOR BACTERIA: CPT

## 2022-11-09 PROCEDURE — 36415 COLL VENOUS BLD VENIPUNCTURE: CPT

## 2022-11-09 RX ORDER — FAMOTIDINE 20 MG/1
20 TABLET, FILM COATED ORAL NIGHTLY
Status: DISCONTINUED | OUTPATIENT
Start: 2022-11-09 | End: 2022-11-12 | Stop reason: HOSPADM

## 2022-11-09 RX ORDER — SODIUM CHLORIDE 0.9 % (FLUSH) 0.9 %
5-40 SYRINGE (ML) INJECTION EVERY 12 HOURS SCHEDULED
Status: DISCONTINUED | OUTPATIENT
Start: 2022-11-09 | End: 2022-11-12 | Stop reason: HOSPADM

## 2022-11-09 RX ORDER — IPRATROPIUM BROMIDE AND ALBUTEROL SULFATE 2.5; .5 MG/3ML; MG/3ML
1 SOLUTION RESPIRATORY (INHALATION) ONCE
Status: COMPLETED | OUTPATIENT
Start: 2022-11-09 | End: 2022-11-09

## 2022-11-09 RX ORDER — HYDRALAZINE HYDROCHLORIDE 20 MG/ML
10 INJECTION INTRAMUSCULAR; INTRAVENOUS EVERY 6 HOURS PRN
Status: DISCONTINUED | OUTPATIENT
Start: 2022-11-09 | End: 2022-11-12 | Stop reason: HOSPADM

## 2022-11-09 RX ORDER — FUROSEMIDE 10 MG/ML
20 INJECTION INTRAMUSCULAR; INTRAVENOUS 2 TIMES DAILY
Status: DISCONTINUED | OUTPATIENT
Start: 2022-11-10 | End: 2022-11-10

## 2022-11-09 RX ORDER — SODIUM CHLORIDE 0.9 % (FLUSH) 0.9 %
5-40 SYRINGE (ML) INJECTION PRN
Status: DISCONTINUED | OUTPATIENT
Start: 2022-11-09 | End: 2022-11-12 | Stop reason: HOSPADM

## 2022-11-09 RX ORDER — ACETAMINOPHEN 325 MG/1
650 TABLET ORAL EVERY 6 HOURS PRN
Status: DISCONTINUED | OUTPATIENT
Start: 2022-11-09 | End: 2022-11-12 | Stop reason: HOSPADM

## 2022-11-09 RX ORDER — AMLODIPINE BESYLATE 5 MG/1
5 TABLET ORAL DAILY
Status: DISCONTINUED | OUTPATIENT
Start: 2022-11-10 | End: 2022-11-11

## 2022-11-09 RX ORDER — SOTALOL HYDROCHLORIDE 80 MG/1
40 TABLET ORAL 2 TIMES DAILY
Status: DISCONTINUED | OUTPATIENT
Start: 2022-11-09 | End: 2022-11-10

## 2022-11-09 RX ORDER — IPRATROPIUM BROMIDE AND ALBUTEROL SULFATE 2.5; .5 MG/3ML; MG/3ML
1 SOLUTION RESPIRATORY (INHALATION) 4 TIMES DAILY
Status: DISCONTINUED | OUTPATIENT
Start: 2022-11-09 | End: 2022-11-10

## 2022-11-09 RX ORDER — SODIUM CHLORIDE 9 MG/ML
INJECTION, SOLUTION INTRAVENOUS PRN
Status: DISCONTINUED | OUTPATIENT
Start: 2022-11-09 | End: 2022-11-12 | Stop reason: HOSPADM

## 2022-11-09 RX ORDER — POLYETHYLENE GLYCOL 3350 17 G/17G
17 POWDER, FOR SOLUTION ORAL DAILY PRN
Status: DISCONTINUED | OUTPATIENT
Start: 2022-11-09 | End: 2022-11-12 | Stop reason: HOSPADM

## 2022-11-09 RX ORDER — FUROSEMIDE 10 MG/ML
20 INJECTION INTRAMUSCULAR; INTRAVENOUS ONCE
Status: COMPLETED | OUTPATIENT
Start: 2022-11-09 | End: 2022-11-09

## 2022-11-09 RX ORDER — AMLODIPINE BESYLATE 5 MG/1
TABLET ORAL DAILY
Status: CANCELLED | OUTPATIENT
Start: 2022-11-09

## 2022-11-09 RX ORDER — ACETAMINOPHEN 650 MG/1
650 SUPPOSITORY RECTAL EVERY 6 HOURS PRN
Status: DISCONTINUED | OUTPATIENT
Start: 2022-11-09 | End: 2022-11-12 | Stop reason: HOSPADM

## 2022-11-09 RX ORDER — ONDANSETRON 4 MG/1
4 TABLET, ORALLY DISINTEGRATING ORAL EVERY 8 HOURS PRN
Status: CANCELLED | OUTPATIENT
Start: 2022-11-09

## 2022-11-09 RX ORDER — BUDESONIDE 0.25 MG/2ML
250 INHALANT ORAL 2 TIMES DAILY
Status: DISCONTINUED | OUTPATIENT
Start: 2022-11-09 | End: 2022-11-12 | Stop reason: HOSPADM

## 2022-11-09 RX ORDER — FUROSEMIDE 10 MG/ML
40 INJECTION INTRAMUSCULAR; INTRAVENOUS 2 TIMES DAILY
Status: DISCONTINUED | OUTPATIENT
Start: 2022-11-10 | End: 2022-11-09

## 2022-11-09 RX ORDER — ENOXAPARIN SODIUM 100 MG/ML
40 INJECTION SUBCUTANEOUS DAILY
Status: CANCELLED | OUTPATIENT
Start: 2022-11-09

## 2022-11-09 RX ORDER — FAMOTIDINE 40 MG/1
1 TABLET, FILM COATED ORAL NIGHTLY
COMMUNITY
Start: 2022-10-26

## 2022-11-09 RX ORDER — ONDANSETRON 2 MG/ML
4 INJECTION INTRAMUSCULAR; INTRAVENOUS EVERY 6 HOURS PRN
Status: CANCELLED | OUTPATIENT
Start: 2022-11-09

## 2022-11-09 RX ADMIN — CEFTRIAXONE 2000 MG: 2 INJECTION, POWDER, FOR SOLUTION INTRAMUSCULAR; INTRAVENOUS at 13:46

## 2022-11-09 RX ADMIN — APIXABAN 2.5 MG: 5 TABLET, FILM COATED ORAL at 21:13

## 2022-11-09 RX ADMIN — SODIUM CHLORIDE, PRESERVATIVE FREE 10 ML: 5 INJECTION INTRAVENOUS at 21:17

## 2022-11-09 RX ADMIN — IPRATROPIUM BROMIDE AND ALBUTEROL SULFATE 1 AMPULE: 2.5; .5 SOLUTION RESPIRATORY (INHALATION) at 21:31

## 2022-11-09 RX ADMIN — IPRATROPIUM BROMIDE AND ALBUTEROL SULFATE 1 AMPULE: .5; 3 SOLUTION RESPIRATORY (INHALATION) at 10:37

## 2022-11-09 RX ADMIN — SODIUM CHLORIDE 25 ML: 9 INJECTION, SOLUTION INTRAVENOUS at 23:20

## 2022-11-09 RX ADMIN — SOTALOL HYDROCHLORIDE 40 MG: 80 TABLET ORAL at 21:08

## 2022-11-09 RX ADMIN — DOXYCYCLINE 100 MG: 100 INJECTION, POWDER, LYOPHILIZED, FOR SOLUTION INTRAVENOUS at 23:21

## 2022-11-09 RX ADMIN — FUROSEMIDE 20 MG: 10 INJECTION, SOLUTION INTRAMUSCULAR; INTRAVENOUS at 22:02

## 2022-11-09 RX ADMIN — FAMOTIDINE 20 MG: 20 TABLET, FILM COATED ORAL at 23:59

## 2022-11-09 RX ADMIN — BUDESONIDE 250 MCG: 0.25 SUSPENSION RESPIRATORY (INHALATION) at 21:31

## 2022-11-09 RX ADMIN — AZITHROMYCIN DIHYDRATE 500 MG: 500 INJECTION, POWDER, LYOPHILIZED, FOR SOLUTION INTRAVENOUS at 14:18

## 2022-11-09 ASSESSMENT — PAIN SCALES - GENERAL
PAINLEVEL_OUTOF10: 0

## 2022-11-09 ASSESSMENT — ENCOUNTER SYMPTOMS
COUGH: 1
NAUSEA: 0
SHORTNESS OF BREATH: 1
ABDOMINAL PAIN: 0
RHINORRHEA: 0
VOMITING: 0
SORE THROAT: 0
COLOR CHANGE: 0

## 2022-11-09 ASSESSMENT — PAIN DESCRIPTION - PAIN TYPE: TYPE: ACUTE PAIN

## 2022-11-09 ASSESSMENT — PAIN - FUNCTIONAL ASSESSMENT: PAIN_FUNCTIONAL_ASSESSMENT: NONE - DENIES PAIN

## 2022-11-09 NOTE — ED PROVIDER NOTES
ED Attending Attestation Note     Date of evaluation: 11/9/2022    This patient was seen by the advance practice provider. I have seen and examined the patient, agree with the workup, evaluation, management and diagnosis. The care plan has been discussed. I have reviewed the ECG and concur with the ELBA's interpretation. HPI: This is a gentleman with past medical history of previous B cell lymphoma, A. fib on Eliquis, GERD who presents with what he describes as \"bronchospasm\". He describes a tightening sensation in his periumbilical and epigastric region that radiates up into his chest and he occasionally feels short of breath with this. He is complaining of a cough that is worse at night. Denies any persistent chest pain, denies any leg pain or leg swelling. No recent travel or immobilization. No abdominal symptoms. Has not tried anything for his symptoms. Past Medical History:   Diagnosis Date    AF (atrial fibrillation) (HCC)     Aortic valve disease     leaking no problems    Lymphoma ([facility] [address]     B cell     Past Surgical History:   Procedure Laterality Date    APPENDECTOMY      COLONOSCOPY  8/18/2006     Dr. Ajith Lemos - internal hemorrhoids , hyperplastic polyp     CYSTOSCOPY  1-11-10     Dr. Roula Kelly ureteral stent     LYMPH NODE BIOPSY  5/11/11    RIGHT CERVICAL    LYMPH NODE DISSECTION N/A 2011    TONSILLECTOMY      UPPER GASTROINTESTINAL ENDOSCOPY N/A 7/23/2022    EGD DIAGNOSTIC ONLY performed by Elza Rust MD at [place] [address] N/A 9/21/2022    EGD BIOPSY performed by Gregorio Kunz MD at [address] History     Tobacco Use    Smoking status: Never    Smokeless tobacco: Never   Vaping Use    Vaping Use: Never used   Substance Use Topics    Alcohol use: No     Alcohol/week: 0.0 standard drinks    Drug use: No       Physical Exam: Elderly gentleman appearing approximately stated age in no acute distress; well-appearing.   Normocephalic atraumatic. No abnormalities of the neck including no lymphadenopathy, trachea is midline. No tachypnea or accessory muscle use. His lungs are clear to auscultation bilaterally. RRR, no m/g/r. Abd is soft, nontender, nondistended. Normal gait. No leg edema or TTP. Assessment and Medical Decision Making: Pt overall well appearing but presents with complaints of vague respiratory symptoms. Diagnostic evaluation is concerning for new hyponatremia of unclear etiology as well as some possible infiltrates on chest x-ray. Given the degree of his acute hyponatremia, will plan for admission for electrolyte repletion and further diagnostic work-up.        Magdaleno Schaumann, MD  11/15/22 9745

## 2022-11-09 NOTE — H&P
MD ALFRED at [facility] Admission:    Not in a hospital admission. Allergies:  Patient has no known allergies. Social History:   TOBACCO:   reports that he has never smoked. He has never used smokeless tobacco.  ETOH:   reports no history of alcohol use. DRUGS :   Patient currently lives     Family History:       Problem Relation Age of Onset    Heart Disease Mother        Review of Systems   All other systems reviewed and are negative. ROS: A 10 point review of systems was conducted, significant findings as noted in HPI. Physical Exam  Constitutional:       General: He is awake. He is not in acute distress. Appearance: He is overweight. He is ill-appearing. He is not toxic-appearing or diaphoretic. Cardiovascular:      Rate and Rhythm: Rhythm irregularly irregular. Pulmonary:      Effort: Tachypnea present. No accessory muscle usage or respiratory distress. Breath sounds: Examination of the right-lower field reveals decreased breath sounds. Examination of the left-lower field reveals decreased breath sounds. Decreased breath sounds present. Comments: Crackles B/L upper lungs   Abdominal:      General: Bowel sounds are normal. There is distension. Tenderness: There is no abdominal tenderness. Musculoskeletal:      Right lower le+ Pitting Edema present. Left lower le+ Pitting Edema present. Skin:     Comments: B/L lower extremity  signs of venous insufficiency   Neurological:      Mental Status: He is alert and oriented to person, place, and time. Psychiatric:         Attention and Perception: Attention and perception normal.         Behavior: Behavior is cooperative.          Cognition and Memory: Cognition and memory normal.        Vitals:    22 1415   BP: (!) 159/78   Pulse: 67   Resp: 21   Temp:    SpO2: 94%       DATA:    Labs:  CBC:   Recent Labs     22  0945   WBC 10.7   HGB 12.5*   HCT 39.3*          BMP: Recent Labs     11/09/22  0945 11/09/22  1129   * 125*   K 4.2 3.5   CL 85* 92*   CO2 22 20*   BUN 23* 21*   CREATININE 1.1 1.0   GLUCOSE 108* 96     LFT's: No results for input(s): AST, ALT, ALB, BILITOT, ALKPHOS in the last 72 hours. Troponin:   Recent Labs     11/09/22  0945   TROPONINI <0.01     BNP:No results for input(s): BNP in the last 72 hours. ABGs: No results for input(s): PHART, RGL1AHN, PO2ART in the last 72 hours. INR: No results for input(s): INR in the last 72 hours. U/A:No results for input(s): NITRITE, COLORU, PHUR, LABCAST, WBCUA, RBCUA, MUCUS, TRICHOMONAS, YEAST, BACTERIA, CLARITYU, SPECGRAV, LEUKOCYTESUR, UROBILINOGEN, BILIRUBINUR, BLOODU, GLUCOSEU, AMORPHOUS in the last 72 hours. Invalid input(s): KETONESU    XR CHEST PORTABLE   Final Result      Streaky bibasilar airspace opacities, possibly infectious. Mild cardiomegaly. ASSESSMENT AND PLAN:    Anthony Garcia is a 41-year-old male past medical history of hypertension hyperlipidemia A. fib on Eliquis presented to the ED with cough and shortness of breath. Suspected New onset heart failure  Patient with A. fib on Eliquis presenting with 2-day history of cough and shortness of breath. There is a 2+ lower extremity pitting edema bilaterally the patient reports is a chronic issue. Last echo was 7/16/2020 with EF of 50 to 55% without any valvular abnormalities  Repeat echo  Physical examination bilateral upper lobe crackles  proBNP elevated 4944 no previous labs for comparison  Follow up  TSH  Chest x-ray showed bilateral haziness  Start Lasix 20 mg IV  Strict I's and O's  Monitor daily standing weight (227lb on admission)  Monitor daily electrolytes and BMPs  Recheck proBNP every third day  Cardiology consult placed follow-up recommendations    Community-acquired pneumonia  Patient has been complaining of cough became productive with reddish sputum.   He said he got recently diagnosed with bronchitis   He tested negative for COVID and influenza in the ED  Chest x-ray concerning for infectious etiologies  Patient is however afebrile with normal leukocyte count  Follow-up Pro-Willis is 0.19  Follow-up blood cultures   Follow-up MRSA nasal probe  Follow-up Legionella antigen  Follow-up strep antigen  Start Doxycycline 100mg  q12 for 7 days  Start ceftriaxone for 5 days  Repeat chest x-ray if symptoms fail to improve  Monitor for any respiratory distress    Moderate Hyponatremia  Patient complained of some headaches but no dizziness, nausea or vomiting.   Sodium is 125 on presentation, this may be due the new onset HF  Follow up serum osmolarity  Follow up urine osmolarity   Follow up urine sodium  Monitor daily BMPs  Nephrology consult placed, follow up recs    Chronic Atrial fibrillation  Patient has history of atrial fibrillation on Eliquis  Rate is currently controlled  Continue on telemetry monitor  Continue Eliquis 2.5 mg BID   Continue  Sotalol 80 mg BID    Essential hypertension  Continue home amlodipine    GERD  Continue famotidine 20 mg nightly    Microcytic Anemia  Follow iron studies  Follow up daily CBCs    Diffuse large cell B cell non-Hodgkin's lymphoma, status post six cycles of Rituxan/CHOP, completed 09/2011      Will discuss with attending physician Brandi Amanda MD      Code Status:Full code  FEN: Low sodium diet  PPX: Lovenox  DISPO: Ana Slaughter MD  11/9/2022,  3:46 PM

## 2022-11-09 NOTE — ED PROVIDER NOTES
[address] [facility]          PHYSICIAN ASSISTANT NOTE       Date of evaluation: 11/9/2022    Chief Complaint     Cough (Pt states bronchospasms started 2 nights ago. Pt states he was d/x with bronchitis at the [facility] on Monday. Pt states is has kept him from sleeping the last two nights. )      History of Present Illness     Michelle Navarro is a 67 y.o. male, with a history of atrial fibrillation for which he is anticoagulated, hypercholesterolemia, stage III chronic kidney disease, GERD and remote diffuse large B-cell lymphoma, who presents with complaints of a 3-day history of dry cough, worse when he is lying down at night. He feels as if he is experiencing \"bronchospasm\"  that he was diagnosed with several years ago. He denies chest pain, occasionally feels short of breath. Has had no fever, chills or other URI type symptoms. No sick contacts to his knowledge. He does have lower extremity swelling which he states he has had for a year and attributes that to his amlodipine, states his cardiologist is not concerned. Is typically improved after he exercises or when he gets up in the morning. He has had no nausea, vomiting, abdominal pain or changes in urinary or bowel habits. The patient exercises regularly and has had no chest pain or shortness of breath with this. He otherwise has no complaints. Review of Systems     Review of Systems   Constitutional:  Negative for chills and fever. HENT:  Negative for congestion, rhinorrhea and sore throat. Respiratory:  Positive for cough and shortness of breath. Cardiovascular:  Positive for leg swelling. Negative for chest pain and palpitations. Gastrointestinal:  Negative for abdominal pain, nausea and vomiting. Genitourinary:  Negative for decreased urine volume and difficulty urinating. Musculoskeletal:  Negative for arthralgias and myalgias. Skin:  Negative for color change and rash.    Neurological: Negative for dizziness, light-headedness and headaches. All other systems reviewed and are negative. Past Medical, Surgical, Family, and Social History     He has a past medical history of AF (atrial fibrillation) ([facility] [address] Anticoagulated, Aortic valve disease, CKD (chronic kidney disease) stage 3, GFR 30-59 ml/min ([facility]), GERD (gastroesophageal reflux disease), History of DVT (deep vein thrombosis), HTN (hypertension), Lymphoma ([facility] [address] Nephrolithiasis, VERA (obstructive sleep apnea), Pure hypercholesterolemia, and Sciatica. He has a past surgical history that includes Cystoscopy (1-11-10); Colonoscopy (8/18/2006 ); Appendectomy; lymph node biopsy (5/11/11); lymph node dissection (N/A, 2011); Tonsillectomy; Upper gastrointestinal endoscopy (N/A, 7/23/2022); and Upper gastrointestinal endoscopy (N/A, 9/21/2022). His family history includes Heart Disease in his mother. He reports that he has never smoked. He has never used smokeless tobacco. He reports that he does not drink alcohol and does not use drugs. Medications     Previous Medications    AMLODIPINE (NORVASC) 5 MG TABLET    TAKE ONE TABLET BY MOUTH DAILY    APIXABAN (ELIQUIS) 2.5 MG TABS TABLET    Take 1 tablet by mouth in the morning and 1 tablet before bedtime. FAMOTIDINE (PEPCID) 40 MG TABLET    Take 1 tablet by mouth at bedtime    PANTOPRAZOLE (PROTONIX) 40 MG TABLET    Take 1 tablet by mouth in the morning and 1 tablet in the evening. Take before meals. SOTALOL (BETAPACE) 80 MG TABLET    Take 40 mg by mouth in the morning and 40 mg before bedtime. Allergies     He has No Known Allergies. Physical Exam     INITIAL VITALS: BP: (!) 165/78, Temp: 98.1 °F (36.7 °C), Heart Rate: 87, Resp: 18, SpO2: 96 %  Physical Exam  Vitals reviewed. Constitutional:       General: He is not in acute distress. Appearance: He is well-developed and normal weight. HENT:      Head: Normocephalic and atraumatic.       Mouth/Throat:      Mouth: Mucous membranes are moist.   Eyes:      Extraocular Movements: Extraocular movements intact. Conjunctiva/sclera: Conjunctivae normal.   Neck:      Trachea: Phonation normal.   Cardiovascular:      Rate and Rhythm: Normal rate. Rhythm irregularly irregular. Heart sounds: No murmur heard. No friction rub. No gallop. Pulmonary:      Effort: Pulmonary effort is normal. No respiratory distress. Breath sounds: Wheezing (diffuse expiratory) present. No rhonchi or rales. Abdominal:      General: There is no distension. Palpations: Abdomen is soft. Tenderness: There is no abdominal tenderness. Musculoskeletal:      Cervical back: Normal range of motion and neck supple. Right lower le+ Pitting Edema present. Left lower le+ Pitting Edema present. Skin:     General: Skin is warm and dry. Findings: No petechiae or rash. Neurological:      Mental Status: He is alert and oriented to person, place, and time. Psychiatric:         Mood and Affect: Mood and affect normal.         Behavior: Behavior normal.       Diagnostic Results     EKG   Interpreted in conjunction with emergency department physician Prete Colon MD  Rhythm:.  Atrial fibrillation  Rate: normal  Axis: normal  Ectopy: none  Conduction: normal  ST Segments: no acute change  T Waves: no acute change  Q Waves:none  Clinical Impression: no acute changes  Comparison:  2022    RECENT VITALS:  BP: (!) 147/80, Temp: 98.1 °F (36.7 °C), Heart Rate: 71, Resp: 22, SpO2: 96 %     ED Course     Nursing Notes, Past Medical Hx,Past Surgical Hx, Social Hx, Allergies, and Family Hx were reviewed.          The patient was given the following medications:  Orders Placed This Encounter   Medications    ipratropium-albuterol (DUONEB) nebulizer solution 1 ampule     Order Specific Question:   Initiate RT Bronchodilator Protocol     Answer:   No    cefTRIAXone (ROCEPHIN) 2,000 mg in dextrose 5 % 50 mL IVPB mini-bag     Order Specific Question:   Antimicrobial Indications     Answer:   Pneumonia (CAP)    azithromycin (ZITHROMAX) 500 mg in dextrose 5 % 250 mL IVPB     Order Specific Question:   Antimicrobial Indications     Answer:   Pneumonia (CAP)       CONSULTS:  IP CONSULT TO HOSPITALIST    MEDICAL DECISION MAKING / ASSESSMENT / Sandra Sweta is a 67 y.o. male presenting with cough, mainly at night when he is lying flat, with slight expiratory wheezing noted on exam as well as 2+ bilateral pitting edema. There is concern for possible cardiac origin given his symptoms although he exercises 2-3 times a week with no problems. His symptoms could certainly be viral in origin as well. He was given a DuoNeb with mild improvement. Labs include an unremarkable CBC. Renal panel results a sodium of 122, chloride 85, it is otherwise unremarkable. This was repeated to ensure accuracy, again sodium resulted at 125, chloride 92. Troponin is negative. BNP is elevated at 4900. VBG is essentially unremarkable. Viral swab is negative. Chest x-ray shows streaky bibasilar airspace opacities, l likely infectious. Also mild cardiomegaly. The patient's chart was reviewed. His most recent echocardiogram in 7/2020 showed normal systolic function with an EF of 50 to 55%, mild concentric left ventricular hypertrophy, indeterminate diastolic function, trivial mitral regurgitation, mild tricuspid regurgitation. Given the patient's lower extremity edema associated with cough when lying flat and wheezing on exam with possible cardiac origin, he will be admitted for a repeat echo and additional management of his hyponatremia. He was given IV Rocephin with Zithromax for possible community-acquired pneumonia. He is in stable condition upon waiting transport to the floor. This patient was also evaluated by the attending physician. All care plans were discussed and agreed upon. Clinical Impression     1. Hyponatremia    2. Nocturnal cough with wheeze    3. Bilateral lower extremity edema    4.  Abnormal chest x-ray        Disposition       DISPOSITION Decision To Admit 11/09/2022 12:49:26 PM      Juani Cotter  11/09/22 1400

## 2022-11-09 NOTE — ED NOTES
Nursing report to [facility], pt moved to room 19.       Gabbi Pereira, KONRAD  11/09/22 9245

## 2022-11-09 NOTE — PROGRESS NOTES
Report called to Kely Olivera on PCU. Made RN aware of labs to be drawn still if lab doesn't complete before transfer.      Electronically signed by Rosario Valera RN on 11/9/2022 at 6:31 PM

## 2022-11-09 NOTE — ED NOTES
Called lab to check on BMP sent 30  minutes ago that isn't in process. They found it and will run it now.      Tamica Hardwick RN  11/09/22 1200

## 2022-11-10 LAB
ANION GAP SERPL CALCULATED.3IONS-SCNC: 19 MMOL/L (ref 3–16)
BASOPHILS ABSOLUTE: 0 K/UL (ref 0–0.2)
BASOPHILS ABSOLUTE: 0 K/UL (ref 0–0.2)
BASOPHILS RELATIVE PERCENT: 0.3 %
BASOPHILS RELATIVE PERCENT: 0.4 %
BUN BLDV-MCNC: 23 MG/DL (ref 7–20)
CALCIUM SERPL-MCNC: 9.2 MG/DL (ref 8.3–10.6)
CHLORIDE BLD-SCNC: 87 MMOL/L (ref 99–110)
CO2: 20 MMOL/L (ref 21–32)
CREAT SERPL-MCNC: 1.1 MG/DL (ref 0.8–1.3)
EOSINOPHILS ABSOLUTE: 0 K/UL (ref 0–0.6)
EOSINOPHILS ABSOLUTE: 0 K/UL (ref 0–0.6)
EOSINOPHILS RELATIVE PERCENT: 0.1 %
EOSINOPHILS RELATIVE PERCENT: 0.1 %
GFR SERPL CREATININE-BSD FRML MDRD: >60 ML/MIN/{1.73_M2}
GLUCOSE BLD-MCNC: 114 MG/DL (ref 70–99)
HCT VFR BLD CALC: 37.8 % (ref 40.5–52.5)
HCT VFR BLD CALC: 38.2 % (ref 40.5–52.5)
HEMOGLOBIN: 12.1 G/DL (ref 13.5–17.5)
HEMOGLOBIN: 12.2 G/DL (ref 13.5–17.5)
IRON SATURATION: 15 % (ref 20–50)
IRON: 49 UG/DL (ref 59–158)
LV EF: 53 %
LVEF MODALITY: NORMAL
LYMPHOCYTES ABSOLUTE: 0.7 K/UL (ref 1–5.1)
LYMPHOCYTES ABSOLUTE: 1 K/UL (ref 1–5.1)
LYMPHOCYTES RELATIVE PERCENT: 6.2 %
LYMPHOCYTES RELATIVE PERCENT: 8.2 %
MAGNESIUM: 1.8 MG/DL (ref 1.8–2.4)
MCH RBC QN AUTO: 23.4 PG (ref 26–34)
MCH RBC QN AUTO: 23.6 PG (ref 26–34)
MCHC RBC AUTO-ENTMCNC: 31.6 G/DL (ref 31–36)
MCHC RBC AUTO-ENTMCNC: 32.2 G/DL (ref 31–36)
MCV RBC AUTO: 73.4 FL (ref 80–100)
MCV RBC AUTO: 73.9 FL (ref 80–100)
MONOCYTES ABSOLUTE: 1.6 K/UL (ref 0–1.3)
MONOCYTES ABSOLUTE: 1.8 K/UL (ref 0–1.3)
MONOCYTES RELATIVE PERCENT: 13.4 %
MONOCYTES RELATIVE PERCENT: 15.7 %
NEUTROPHILS ABSOLUTE: 8.8 K/UL (ref 1.7–7.7)
NEUTROPHILS ABSOLUTE: 9.2 K/UL (ref 1.7–7.7)
NEUTROPHILS RELATIVE PERCENT: 75.6 %
NEUTROPHILS RELATIVE PERCENT: 80 %
OSMOLALITY: 272 MOSM/KG (ref 278–305)
PDW BLD-RTO: 20.5 % (ref 12.4–15.4)
PDW BLD-RTO: 20.8 % (ref 12.4–15.4)
PLATELET # BLD: 245 K/UL (ref 135–450)
PLATELET # BLD: 258 K/UL (ref 135–450)
PMV BLD AUTO: 7.6 FL (ref 5–10.5)
PMV BLD AUTO: 8.1 FL (ref 5–10.5)
POTASSIUM SERPL-SCNC: 3.9 MMOL/L (ref 3.5–5.1)
RBC # BLD: 5.16 M/UL (ref 4.2–5.9)
RBC # BLD: 5.18 M/UL (ref 4.2–5.9)
SODIUM BLD-SCNC: 126 MMOL/L (ref 136–145)
TOTAL IRON BINDING CAPACITY: 330 UG/DL (ref 260–445)
TSH SERPL DL<=0.05 MIU/L-ACNC: 2.04 UIU/ML (ref 0.27–4.2)
WBC # BLD: 11.5 K/UL (ref 4–11)
WBC # BLD: 11.6 K/UL (ref 4–11)

## 2022-11-10 PROCEDURE — 6370000000 HC RX 637 (ALT 250 FOR IP)

## 2022-11-10 PROCEDURE — 83735 ASSAY OF MAGNESIUM: CPT

## 2022-11-10 PROCEDURE — 80061 LIPID PANEL: CPT

## 2022-11-10 PROCEDURE — 85025 COMPLETE CBC W/AUTO DIFF WBC: CPT

## 2022-11-10 PROCEDURE — 6360000002 HC RX W HCPCS

## 2022-11-10 PROCEDURE — 2500000003 HC RX 250 WO HCPCS

## 2022-11-10 PROCEDURE — 6370000000 HC RX 637 (ALT 250 FOR IP): Performed by: INTERNAL MEDICINE

## 2022-11-10 PROCEDURE — 6360000002 HC RX W HCPCS: Performed by: INTERNAL MEDICINE

## 2022-11-10 PROCEDURE — 2580000003 HC RX 258

## 2022-11-10 PROCEDURE — 94640 AIRWAY INHALATION TREATMENT: CPT

## 2022-11-10 PROCEDURE — 87641 MR-STAPH DNA AMP PROBE: CPT

## 2022-11-10 PROCEDURE — 80048 BASIC METABOLIC PNL TOTAL CA: CPT

## 2022-11-10 PROCEDURE — 97530 THERAPEUTIC ACTIVITIES: CPT

## 2022-11-10 PROCEDURE — 2060000000 HC ICU INTERMEDIATE R&B

## 2022-11-10 PROCEDURE — 94761 N-INVAS EAR/PLS OXIMETRY MLT: CPT

## 2022-11-10 PROCEDURE — 97116 GAIT TRAINING THERAPY: CPT

## 2022-11-10 PROCEDURE — 97161 PT EVAL LOW COMPLEX 20 MIN: CPT

## 2022-11-10 PROCEDURE — 51798 US URINE CAPACITY MEASURE: CPT

## 2022-11-10 PROCEDURE — 93306 TTE W/DOPPLER COMPLETE: CPT

## 2022-11-10 PROCEDURE — 36415 COLL VENOUS BLD VENIPUNCTURE: CPT

## 2022-11-10 PROCEDURE — 97535 SELF CARE MNGMENT TRAINING: CPT

## 2022-11-10 PROCEDURE — 99223 1ST HOSP IP/OBS HIGH 75: CPT | Performed by: INTERNAL MEDICINE

## 2022-11-10 PROCEDURE — 97166 OT EVAL MOD COMPLEX 45 MIN: CPT

## 2022-11-10 RX ORDER — TAMSULOSIN HYDROCHLORIDE 0.4 MG/1
0.4 CAPSULE ORAL DAILY
Status: DISCONTINUED | OUTPATIENT
Start: 2022-11-10 | End: 2022-11-12 | Stop reason: HOSPADM

## 2022-11-10 RX ORDER — FUROSEMIDE 10 MG/ML
20 INJECTION INTRAMUSCULAR; INTRAVENOUS 3 TIMES DAILY
Status: DISCONTINUED | OUTPATIENT
Start: 2022-11-10 | End: 2022-11-11

## 2022-11-10 RX ORDER — IPRATROPIUM BROMIDE AND ALBUTEROL SULFATE 2.5; .5 MG/3ML; MG/3ML
1 SOLUTION RESPIRATORY (INHALATION) 2 TIMES DAILY
Status: DISCONTINUED | OUTPATIENT
Start: 2022-11-11 | End: 2022-11-12 | Stop reason: HOSPADM

## 2022-11-10 RX ORDER — LABETALOL HYDROCHLORIDE 5 MG/ML
10 INJECTION, SOLUTION INTRAVENOUS EVERY 6 HOURS PRN
Status: DISCONTINUED | OUTPATIENT
Start: 2022-11-10 | End: 2022-11-12 | Stop reason: HOSPADM

## 2022-11-10 RX ORDER — GUAIFENESIN/DEXTROMETHORPHAN 100-10MG/5
5 SYRUP ORAL EVERY 4 HOURS PRN
Status: DISCONTINUED | OUTPATIENT
Start: 2022-11-10 | End: 2022-11-12 | Stop reason: HOSPADM

## 2022-11-10 RX ORDER — POTASSIUM CHLORIDE 20 MEQ/1
20 TABLET, EXTENDED RELEASE ORAL ONCE
Status: COMPLETED | OUTPATIENT
Start: 2022-11-10 | End: 2022-11-10

## 2022-11-10 RX ORDER — LANOLIN ALCOHOL/MO/W.PET/CERES
400 CREAM (GRAM) TOPICAL ONCE
Status: COMPLETED | OUTPATIENT
Start: 2022-11-10 | End: 2022-11-10

## 2022-11-10 RX ADMIN — SOTALOL HYDROCHLORIDE 40 MG: 80 TABLET ORAL at 08:43

## 2022-11-10 RX ADMIN — TAMSULOSIN HYDROCHLORIDE 0.4 MG: 0.4 CAPSULE ORAL at 09:48

## 2022-11-10 RX ADMIN — APIXABAN 2.5 MG: 5 TABLET, FILM COATED ORAL at 08:43

## 2022-11-10 RX ADMIN — BUDESONIDE 250 MCG: 0.25 SUSPENSION RESPIRATORY (INHALATION) at 21:05

## 2022-11-10 RX ADMIN — SODIUM CHLORIDE, PRESERVATIVE FREE 10 ML: 5 INJECTION INTRAVENOUS at 20:39

## 2022-11-10 RX ADMIN — METOPROLOL TARTRATE 25 MG: 25 TABLET, FILM COATED ORAL at 20:38

## 2022-11-10 RX ADMIN — APIXABAN 5 MG: 5 TABLET, FILM COATED ORAL at 20:38

## 2022-11-10 RX ADMIN — CEFTRIAXONE 2000 MG: 2 INJECTION, POWDER, FOR SOLUTION INTRAMUSCULAR; INTRAVENOUS at 13:33

## 2022-11-10 RX ADMIN — IPRATROPIUM BROMIDE AND ALBUTEROL SULFATE 1 AMPULE: 2.5; .5 SOLUTION RESPIRATORY (INHALATION) at 15:27

## 2022-11-10 RX ADMIN — DOXYCYCLINE 100 MG: 100 INJECTION, POWDER, LYOPHILIZED, FOR SOLUTION INTRAVENOUS at 23:31

## 2022-11-10 RX ADMIN — POTASSIUM CHLORIDE 20 MEQ: 1500 TABLET, EXTENDED RELEASE ORAL at 09:48

## 2022-11-10 RX ADMIN — SODIUM CHLORIDE, PRESERVATIVE FREE 10 ML: 5 INJECTION INTRAVENOUS at 08:49

## 2022-11-10 RX ADMIN — FAMOTIDINE 20 MG: 20 TABLET, FILM COATED ORAL at 20:38

## 2022-11-10 RX ADMIN — AMLODIPINE BESYLATE 5 MG: 5 TABLET ORAL at 00:49

## 2022-11-10 RX ADMIN — GUAIFENESIN SYRUP AND DEXTROMETHORPHAN 5 ML: 100; 10 SYRUP ORAL at 03:05

## 2022-11-10 RX ADMIN — FUROSEMIDE 20 MG: 10 INJECTION, SOLUTION INTRAMUSCULAR; INTRAVENOUS at 08:43

## 2022-11-10 RX ADMIN — FUROSEMIDE 20 MG: 10 INJECTION, SOLUTION INTRAMUSCULAR; INTRAVENOUS at 13:20

## 2022-11-10 RX ADMIN — FUROSEMIDE 20 MG: 10 INJECTION, SOLUTION INTRAMUSCULAR; INTRAVENOUS at 20:38

## 2022-11-10 RX ADMIN — Medication 400 MG: at 09:48

## 2022-11-10 RX ADMIN — DOXYCYCLINE 100 MG: 100 INJECTION, POWDER, LYOPHILIZED, FOR SOLUTION INTRAVENOUS at 13:32

## 2022-11-10 ASSESSMENT — PAIN SCALES - GENERAL
PAINLEVEL_OUTOF10: 0

## 2022-11-10 NOTE — PLAN OF CARE
Problem: Discharge Planning  Goal: Discharge to home or other facility with appropriate resources  11/10/2022 1534 by Erika Canela RN  Outcome: Progressing  Flowsheets (Taken 11/10/2022 1534)  Discharge to home or other facility with appropriate resources:   Identify discharge learning needs (meds, wound care, etc)   Identify barriers to discharge with patient and caregiver     Problem: Respiratory - Adult  Goal: Achieves optimal ventilation and oxygenation  11/10/2022 1534 by Erika Canela RN  Outcome: Progressing  Flowsheets (Taken 11/10/2022 0843)  Achieves optimal ventilation and oxygenation: Assess for changes in respiratory status     Problem: Cardiovascular - Adult  Goal: Maintains optimal cardiac output and hemodynamic stability  11/10/2022 1534 by Erika Canela RN  Outcome: Progressing  Flowsheets (Taken 11/10/2022 4396)  Maintains optimal cardiac output and hemodynamic stability: Monitor blood pressure and heart rate     Problem: Cardiovascular - Adult  Goal: Absence of cardiac dysrhythmias or at baseline  11/10/2022 1534 by Erika Canela RN  Outcome: Progressing  Flowsheets  Taken 11/10/2022 1534  Absence of cardiac dysrhythmias or at baseline: Monitor cardiac rate and rhythm  Taken 11/10/2022 0843  Absence of cardiac dysrhythmias or at baseline: Monitor cardiac rate and rhythm     Problem: Metabolic/Fluid and Electrolytes - Adult  Goal: Electrolytes maintained within normal limits  11/10/2022 1534 by Erika Canela RN  Outcome: Progressing     Problem: Metabolic/Fluid and Electrolytes - Adult  Goal: Hemodynamic stability and optimal renal function maintained  11/10/2022 1534 by Erika Canela RN  Outcome: Progressing     Problem: Skin/Tissue Integrity  Goal: Absence of new skin breakdown  Description: 1. Monitor for areas of redness and/or skin breakdown  2. Assess vascular access sites hourly  3. Every 4-6 hours minimum:  Change oxygen saturation probe site  4.   Every 4-6 hours:  If on nasal continuous positive airway pressure, respiratory therapy assess nares and determine need for appliance change or resting period.   11/10/2022 1534 by Jovana Flores RN  Outcome: Progressing     Problem: Safety - Adult  Goal: Free from fall injury  11/10/2022 1534 by Jovana Flores RN  Outcome: Progressing  Flowsheets (Taken 11/10/2022 0800)  Free From Fall Injury: Instruct family/caregiver on patient safety     Problem: ABCDS Injury Assessment  Goal: Absence of physical injury  11/10/2022 1534 by Jovana Flores RN  Outcome: Progressing  Flowsheets  Taken 11/10/2022 1534  Absence of Physical Injury: Implement safety measures based on patient assessment  Taken 11/10/2022 0800  Absence of Physical Injury: Implement safety measures based on patient assessment

## 2022-11-10 NOTE — CARE COORDINATION
Case Management Assessment  Initial Evaluation    Date/Time of Evaluation: 11/10/2022 10:04 AM  Assessment Completed by: Hortencia Ponce    If patient is discharged prior to next notation, then this note serves as note for discharge by case management. Patient Name: Tej Garcia                   YOB: 1950  Diagnosis: Hyponatremia [E87.1]  Abnormal chest x-ray [R93.89]  Bilateral lower extremity edema [R60.0]  Nocturnal cough with wheeze [R05.8, R06.2]                   Date / Time: 11/9/2022  9:16 AM    Patient Admission Status: Inpatient   Readmission Risk (Low < 19, Mod (19-27), High > 27): Readmission Risk Score: 15.2    Current PCP: Md Jason Baldwin  PCP verified by CM? Chart Reviewed: Yes      History Provided by: Patient  Patient Orientation: Alert and Oriented    Patient Cognition: Alert    Hospitalization in the last 30 days (Readmission):  No    If yes, Readmission Assessment in CM Navigator will be completed. Advance Directives:      Code Status: Full Code   Patient's Primary Decision Maker is: Patient Declined (Legal Next of Kin Remains as Decision Maker)    Primary Decision MakerMrishi Andrade - [phone]    Discharge Planning:    Patient lives with: Spouse/Significant Other Type of Home: House  Primary Care Giver: Self  Patient Support Systems include: Spouse/Significant Other   Current Financial resources:    Current community resources:    Current services prior to admission: C-pap            Current DME:              Type of Home Care services:  None    ADLS  Prior functional level: Independent in ADLs/IADLs  Current functional level: Independent in ADLs/IADLs    PT AM-PAC: 19 /24  OT AM-PAC: 20 /24    Family can provide assistance at DC: Yes  Would you like Case Management to discuss the discharge plan with any other family members/significant others, and if so, who?  No  Plans to Return to Present Housing: Yes  Other Identified Issues/Barriers to RETURNING to current housing: none  Potential Assistance needed at discharge: N/A            Potential DME:    Patient expects to discharge to: [facility] for transportation at discharge:      Financial    Payor: [address] [place] [address] / Plan: MEDICARE PART A AND B / Product Type: *No Product type* /     Does insurance require precert for SNF: No    Potential assistance Purchasing Medications: No  Meds-to-Beds request:        Michelle 21 [phone] [place] - [address] [address] [facility]  [address] [address]  Phone: [phone] Fax: [phone]      Notes:    Factors facilitating achievement of predicted outcomes: Family support, Motivated, Cooperative, and Pleasant    Barriers to discharge: Medication managment    Additional Case Management Notes: CM spoke with pt at bedside, denies any current home care services. Pt states he is going to buy a shower chair off of [employer], [address] [place] [address] Rd told pt if he has any issues we can get a DME order and set him up with a shower chair- pt declined. Pt to go home with no needs, please consult CM if needs arise. The Plan for Transition of Care is related to the following treatment goals of Hyponatremia [E87.1]  Abnormal chest x-ray [R93.89]  Bilateral lower extremity edema [R60.0]  Nocturnal cough with wheeze [R05.8, M45.7]    IF APPLICABLE: The Patient and/or patient representative Delmus Gowers and his family were provided with a choice of provider and agrees with the discharge plan. Freedom of choice list with basic dialogue that supports the patient's individualized plan of care/goals and shares the quality data associated with the providers was provided to:     Patient Representative Name:       The Patient and/or Patient Representative Agree with the Discharge Plan?       Clovis Strauss  Case Management Department  Ph: [phone] Fax:

## 2022-11-10 NOTE — PROGRESS NOTES
Clinical Pharmacy Progress Note  Medication History    Admit Date: 11/9/2022    List of current medications patient is taking is complete. Home medication list in EPIC updated to reflect changes noted below. Source of information: patient and outpatient fill history    Home Pharmacy: [facility] [id number] ([phone])    Changes made to medication list:   Medications removed:  Pantoprazole 40 MG tablet - pt states that they discontinued this due to GI Bleed ~1 month ago (October 2022)    Medication doses adjusted:   Sotalol 80 MG tablet - Pt states that they cut an 80 mg tablet in half and takes a total of 40 mg BID. Notes:  Medication DOREEN: Gentamicin caused Ototoxicity in pt's father, so they avoid taking it at all costs (pt has never taken it)        Please call with any questions. Radha Narvaez, PharmD Candidate    No current facility-administered medications on file prior to encounter. Current Outpatient Medications on File Prior to Encounter   Medication Sig    famotidine (PEPCID) 40 MG tablet Take 1 tablet by mouth at bedtime    amLODIPine (NORVASC) 5 MG tablet TAKE ONE TABLET BY MOUTH DAILY    apixaban (ELIQUIS) 2.5 MG TABS tablet Take 1 tablet by mouth in the morning and 1 tablet before bedtime.     sotalol (BETAPACE) 80 MG tablet Take 40 mg by mouth 2 times daily

## 2022-11-10 NOTE — PROGRESS NOTES
Pharmacy notified to come review medications with patient, as sotalol is on home med list.  Electronically signed by Ludy Iyer RN on 11/9/2022 at 7:16 PM

## 2022-11-10 NOTE — CONSULTS
[facility]  Cardiology Inpatient Consult Service                                                                                          Pt Name: Risa Álvarez  Age: 67 y.o. Sex: male  : 1950  Location: [address] [place] [address]    Referring Physician: Karissa Cevallos MD    Primary Cardiologist: Dr. Carley Gama    Reason for Consultation/Chief Complaint: Sofiya. New Heart Failure      HPI      Risa Álvarez is a 67 y.o. male with a medical history significant for afib (eliquis), HLD, CKDIII, GERD, B-cell Lymphoma, VERA on CPAP who presented to the hospital with cough, SOB for 3 days. Patient reports he has had a progressive, productive cough for several days that is new. He states he does NOT have shortness of breath to me. He can lay down flat without any shortness of breath. He does state after exercising for 40 minutes he can be short of breath but that is not progressively worse for him. Patient also states his legs have been swollen for about a year which he states is due to amlodipine. He denies chest pain, SOB, ,palpitations, lightheadedness, nausea, bowel or urinary changes. Previous results:  Echo: 2020  EF 50-55%, Pulm. Artery. Pressure 31 mmHg    He denies chest pain, chest pressure/discomfort, claudication, dyspnea, orthopnea, palpitations, and syncope. ED course: \"Renal panel results a sodium of 122, chloride 85, it is otherwise unremarkable. This was repeated to ensure accuracy, again sodium resulted at 125, chloride 92. Troponin is negative. BNP is elevated at 4900. VBG is essentially unremarkable. Viral swab is negative. \" CXR shows streaky bibasilar airspace opacities, likely infectious.  Cardiomegaly    Histories     Past Medical History:   has a past medical history of AF (atrial fibrillation) ([facility] [address] Anticoagulated, Aortic valve disease, CKD (chronic kidney disease) stage 3, GFR 30-59 ml/min ([facility]), GERD (gastroesophageal reflux disease), History of DVT (deep vein thrombosis), HTN (hypertension), Lymphoma ([facility] [address] Nephrolithiasis, VERA (obstructive sleep apnea), Pure hypercholesterolemia, and Sciatica. Surgical History:   has a past surgical history that includes Cystoscopy (1-11-10); Colonoscopy (8/18/2006 ); Appendectomy; lymph node biopsy (5/11/11); lymph node dissection (N/A, 2011); Tonsillectomy; Upper gastrointestinal endoscopy (N/A, 7/23/2022); and Upper gastrointestinal endoscopy (N/A, 9/21/2022). Social History:   reports that he has never smoked. He has never used smokeless tobacco. He reports that he does not drink alcohol and does not use drugs. Family History:  No evidence for sudden cardiac death or premature CAD. Medications     Home Medications  Prior to Admission medications    Medication Sig Start Date End Date Taking? Authorizing Provider   famotidine (PEPCID) 40 MG tablet Take 1 tablet by mouth at bedtime 10/26/22   Historical Provider, MD   amLODIPine (NORVASC) 5 MG tablet TAKE ONE TABLET BY MOUTH DAILY 8/29/22   Forest Ramos MD   apixaban (ELIQUIS) 2.5 MG TABS tablet Take 1 tablet by mouth in the morning and 1 tablet before bedtime.  7/28/22   Forest Ramos MD   sotalol (BETAPACE) 80 MG tablet Take 40 mg by mouth 2 times daily    Historical Provider, MD          Inpatient Medications:   tamsulosin  0.4 mg Oral Daily    apixaban  5 mg Oral BID    metoprolol tartrate  25 mg Oral BID    furosemide  20 mg IntraVENous TID    cefTRIAXone (ROCEPHIN) IV  2,000 mg IntraVENous Q24H    famotidine  20 mg Oral Nightly    sodium chloride flush  5-40 mL IntraVENous 2 times per day    doxycycline (VIBRAMYCIN) IV  100 mg IntraVENous Q12H    ipratropium-albuterol  1 ampule Inhalation 4x daily    budesonide  250 mcg Nebulization BID    amLODIPine  5 mg Oral Daily       IV drips:   sodium chloride 25 mL (11/09/22 1320)       PRN:  guaiFENesin-dextromethorphan, labetalol, sodium chloride flush, sodium chloride, polyethylene glycol, acetaminophen **OR** acetaminophen, perflutren lipid microspheres, hydrALAZINE    Allergy     Gentamicin       Review of Systems     Pertinent positives identified in the HPI, all other review of symptoms negative as below. CONSTITUTIONAL: No unanticipated weight loss, change in energy level, sleep pattern, or activity level. SKIN: No rash or pruritis. EYES: No visual changes or diplopia. ENT: No hearing loss or tinnitus. No mouth sores or sore throat. CARDIOVASCULAR: No chest pain/chest pressure/chest discomfort or palpitations. RESPIRATORY: No dyspnea, has cough, wheezing, has sputum production, or hematemesis. GASTROINTESTINAL: No nausea, vomiting, diarrhea, constipation, melena, hematochezia, abdominal pain, or appetite loss. GENITOURINARY: No dysuria, trouble voiding, or hematuria. MUSCULOSKELETAL:  No gait disturbance, weakness, or joint complaints. NEUROLOGICAL: No headache, diplopia, change in muscle strength, numbness or tingling. No change in gait, balance, coordination, mood, affect, memory, mentation, behavior. PSYCH: No anxiety, loss of interest, change in sexual behavior, feelings of self-harm, or confusion. ENDOCRINE: No malaise, fatigue or temperature intolerance. No excessive thirst, fluid intake, or urination. No tremor. HEMATOLOGIC: No abnormal bruising or bleeding. ALLERGY: No nasal congestion or hives. Physical Examination     Vitals:    11/10/22 0434 11/10/22 0529 11/10/22 0728 11/10/22 1208   BP:  (!) 159/75 (!) 177/83 (!) 160/91   Pulse:  87 97 78   Resp:  20 22 20   Temp:  98.3 °F (36.8 °C) 98.1 °F (36.7 °C) 98.1 °F (36.7 °C)   TempSrc:  Oral Oral Oral   SpO2:  96% 100% 97%   Weight: 218 lb 11.1 oz (99.2 kg)      Height:           Wt Readings from Last 3 Encounters:   11/10/22 218 lb 11.1 oz (99.2 kg)   09/21/22 217 lb (98.4 kg)   07/27/22 215 lb 9.6 oz (97.8 kg)       Objective:  General Appearance:  Well-appearing.     Vital signs: (most recent): Blood pressure (!) 160/91, pulse 78, temperature 98.1 °F (36.7 °C), temperature source Oral, resp. rate 20, height 6' 1\" (1.854 m), weight 218 lb 11.1 oz (99.2 kg), SpO2 97 %. No fever. HEENT: Normal HEENT exam.    Lungs:  Normal effort and normal respiratory rate. There are rhonchi. Heart: Normal rate. Irregular rhythm. Abdomen: Abdomen is soft. Extremities: (Edema BL LE)  Neurological: Patient is oriented to person, place and time. Skin:  Warm. Labs     Recent Labs     11/09/22 0945 11/09/22  1129 11/09/22  2055 11/10/22  0452   * 125* 125*  126* 126*   K 4.2 3.5 4.1 3.9   BUN 23* 21* 24* 23*   CREATININE 1.1 1.0 1.2 1.1   CL 85* 92* 89* 87*   CO2 22 20* 23 20*   GLUCOSE 108* 96 124* 114*   CALCIUM 9.3 7.8* 8.9 9.2   MG  --   --   --  1.80     Recent Labs     11/09/22  0945 11/10/22  0452   WBC 10.7 11.6*   HGB 12.5* 12.2*   HCT 39.3* 37.8*    258   MCV 74.6* 73.4*     No results for input(s): CHOLTOT, TRIG, HDL, CHOLHDL, LDL in the last 72 hours. Invalid input(s): Tash Escalante  No results for input(s): APTT, INR in the last 72 hours. Invalid input(s): PT  Recent Labs     11/09/22 0945   TROPONINI <0.01     Recent Labs     11/09/22 0945   PROBNP 4,944*     Recent Labs     11/09/22 2055   TSH 2.04     No results for input(s): CHOL, HDL, LDLCALC, TRIG in the last 72 hours.]    Lab Results   Component Value Date    TROPONINI <0.01 11/09/2022         Imaging     Telemetry:  Atrial Fib      Assessment & Plan     CAP  Productive Cough, COVID and Flu negative  CXR concerning for infectious process  Antibiotics per primary team    Volume overload, Sofiya.  New onset heart failure  While patient symptoms may have other explanations, will obtain ECHO to r/o HF  SOB reported on admission may be more cough, patient does not endorse SOB to me  BL LE edema may be due to Amlodipine  Pro-BNP is somewhat elevated at 4,944  -Continue Amlodipine  -Getting Lasix  -Await new ECHO: r/o HF    Atrial Fibrillation  In atrial fibrillation right now, asymptomatic   -continue Apixaban  -Sotalol switched to Lopressor      Thank you for allowing to us to participate in the care or Tello Gosselin. Further evaluation will be based upon the patient's clinical course and testing results. Ye Mckeon MD  Internal Medicine, PGY-1    Original note by resident/student was edited based on my personal history and exam of the patient. I have personally reviewed the reports and images of labs, radiological studies, cardiac studies including ECG's and telemetry, current and old medical records. The note was completed using EMR. Every effort was made to ensure accuracy; however, inadvertent computerized transcription errors may be present. Patient is a 68 yo man, patient of Dr Marti Daniel, with h/o CAF on eliquis, HLP, CKD3, remote B cell lymphoma. Patient presented to ED on 11/9 with dyspnea x 3 days. He was admitted for acute heart failure, possible CAP, severe HTN. Echo 07/2020: [address], EF 55%, indeterminate diastolic, normal RV, mild valve disease, AscAo 4.1 cm (echo similar to echoes from 3604-8945). ECG c/w AF 79 bpm, otherwise normal (similar to prior ECGs)      Assessment and Plan:   1. Acute on chronic HFpEF  2. CAP. 3. CAF on AC. 4. HLP        -Agree with IV lasix 20 q8 hours  - Strict I's and O's every shift and standing weights if possible, low-salt diet, daily BMP with reflex to Mg (correct lytes for goals K >4.0 and Mg > 2.0) and wean supplemental oxygen to off (or down to baseline supplemental oxygen requirements) for sats greater than 92-94%. -Change sotalol 40 bid to lopressor 25 bid (due to chronic AF). -Cw IV antibiotics per ID team.  - Continue with Eliquis, amlodipine 5 mg daily. I have personally reviewed the reports and images of labs, radiological studies, cardiac studies including ECG's and telemetry, current and old medical records.  The note was completed using EMR and Dragon dictation system. Every effort was made to ensure accuracy; however, inadvertent computerized transcription errors may be present. All questions and concerns were addressed to the patient/family. Alternatives to my treatment were discussed. I would like to thank you for providing me the opportunity to participate in the care of your patient. If you have any questions, please do not hesitate to contact me.      Linda Gray MD, [facility], [address]  The [address]  [address] [address]  Ph: [phone]  Fax: [phone]

## 2022-11-10 NOTE — CONSULTS
Nutrition Note    Consult for heart failure diet education. Pt was seen in room. He reports being aware of his sodium intake but was not aware that he shoudl limit his fluid intake. We discussed sodium and fluid restrictions, tips for limiting intake, and label reading. He was very receptive to education. Pt was given handout from [facility] and told to ask for dietitian if any further questions arise. Instructed the Patient on:   [x] Low Sodium foods  [x] Sodium free  [x] Fluids   [] Other     Educational Materials Provided:   [] [facility]) Heart Failure Education folder- Nutrition Therapy   [x] Nutrition Care Manual Heart Failure Nutrition Therapy   [] Other     Time Spent with Patient (minutes): 15 min    The patient will still be monitored per nutrition standards of care. Consult dietitian if nutrition interventions essential to patient care is needed.      Samina Mccauley, [address]: [phone]  Office:  [phone]

## 2022-11-10 NOTE — PROGRESS NOTES
Occupational Therapy  Facility/Department: [place] [address] [facility]  Occupational Therapy Initial Assessment/Treatment    Name: Emre Adams  : 1950  MRN: [id number]  Date of Service: 11/10/2022    Discharge Recommendations:   Emre Adams scored a 20/24 on the AM-PAC ADL Inpatient form. Current research shows that an AM-PAC score of 18 or greater is typically associated with a discharge to the patient's home setting. Based on the patient's AM-PAC score, and their current ADL deficits, it is recommended that the patient have 2-3 sessions per week of Occupational Therapy at d/c to increase the patient's independence. At this time, this patient demonstrates the endurance and safety to discharge  home with home services and a follow up treatment frequency of 2-3x/wk. Please see assessment section for further patient specific details. If patient discharges prior to next session this note will serve as a discharge summary. Please see below for the latest assessment towards goals. OT Equipment Recommendations  Equipment Needed: Yes  Mobility Devices: ADL Assistive Devices  ADL Assistive Devices: Shower Chair with back;Grab Bars - shower       Patient Diagnosis(es): The primary encounter diagnosis was Hyponatremia. Diagnoses of Nocturnal cough with wheeze, Bilateral lower extremity edema, and Abnormal chest x-ray were also pertinent to this visit. Past Medical History:  has a past medical history of AF (atrial fibrillation) ([facility] [address] Anticoagulated, Aortic valve disease, CKD (chronic kidney disease) stage 3, GFR 30-59 ml/min (HCC), GERD (gastroesophageal reflux disease), History of DVT (deep vein thrombosis), HTN (hypertension), Lymphoma ([facility] [address] Nephrolithiasis, VERA (obstructive sleep apnea), Pure hypercholesterolemia, and Sciatica. Past Surgical History:  has a past surgical history that includes Cystoscopy (1-11-10); Colonoscopy (2006 );  Appendectomy; lymph node biopsy (11); lymph node dissection (N/A, 2011); Tonsillectomy; Upper gastrointestinal endoscopy (N/A, 7/23/2022); and Upper gastrointestinal endoscopy (N/A, 9/21/2022). Treatment Diagnosis: Impaired ADL, functional activity tolerance, functional mobility      Assessment   Performance deficits / Impairments: Decreased functional mobility ; Decreased ADL status; Decreased endurance  Assessment: Pt presents with a decline in functional independence. Pt is from home with wife and was independent with mobility and req some assist with dressing. Currently, pt req CG-SBA for mobility and mod assist for lower body dressing. Anticipate good preogress for home discharge wiht assist from wife. Treatment Diagnosis: Impaired ADL, functional activity tolerance, functional mobility  Prognosis: Good  Decision Making: Medium Complexity  REQUIRES OT FOLLOW-UP: Yes  Activity Tolerance  Activity Tolerance: Patient limited by fatigue  Activity Tolerance Comments: PALOMARES, pt required freequent rests        Plan   Occupational Therapy Plan  Times Per Week: 2-5     Restrictions  Position Activity Restriction  Other position/activity restrictions: Up with Assistance, HOB elevated 30 degrees    Subjective   General  Chart Reviewed: Yes  Additional Pertinent Hx: Pt admitted 11/10/22 with cough. X-ray chest= Streaky bibasilar airspace opacities, possibly infectious. Mild cardiomegaly. Family / Caregiver Present: No  Referring Practitioner: Dr Jose Crabtree  Diagnosis: Hyponatremia  Subjective  Subjective: Upon entry, pt sitting edge of bed. Agreeable to activity.     Pt c/o some foot pain (positioned to comfort)     Social/Functional History  Social/Functional History  Lives With: Spouse  Type of Home: House  Home Layout: Two level, 1/2 bath on main level (Laundry on first level)  Home Access: Stairs to enter without rails  Entrance Stairs - Number of Steps: 1  Bathroom Shower/Tub: Tub/Shower unit  Bathroom Toilet: Handicap height  Bathroom Equipment: Hand-held shower (Planning on getting grab bars and shower chair put in)  Bathroom Accessibility: Walker accessible  Home Equipment: Kendra Tinajero  Has the patient had two or more falls in the past year or any fall with injury in the past year?: No  ADL Assistance: Independent (wife sometimes assists with socks and shoes)  Homemaking Assistance: Independent  Homemaking Responsibilities: Yes  Ambulation Assistance: Independent  Transfer Assistance: Independent  Active : Yes  Mode of Transportation: Car  Occupation: Retired  Type of Occupation: [employer]   Leisure & Hobbies: Tv, reading books       Objective      Safety Devices  Type of Devices: All fall risk precautions in place;Call light within reach; Left in chair;Chair alarm in place;Nurse notified (Simultaneous filing. User may not have seen previous data.)     Toilet Transfers  Toilet - Technique: Ambulating  Equipment Used: Standard toilet (grab bar)  Toilet Transfer: Contact guard assistance (to SBA)  AROM: Within functional limits  Strength: Within functional limits  Coordination: Within functional limits  ADL  Feeding: Independent  Grooming: Stand by assistance (to wash hands, standing at sink)  LE Dressing:  Moderate assistance (hospital pants and socks)  Toileting: Stand by assistance     Activity Tolerance  Activity Tolerance: Patient tolerated treatment well  Bed mobility  Supine to Sit: Stand by assistance  Scooting: Stand by assistance  Transfers  Stand Step Transfers: Contact guard assistance (to SBA)  Sit to stand: Stand by assistance  Stand to sit: Stand by assistance  Transfer Comments: Pt walked to/from bathroom and in holland with SBA (no device)  Vision  Vision: Impaired  Vision Exceptions: Wears glasses at all times  Hearing  Hearing: Within functional limits  Cognition  Overall Cognitive Status: WNL  Orientation  Overall Orientation Status: Within Normal Limits  Orientation Level: Oriented X4                  Education Given To: Patient  Education Provided: Role of Therapy;Plan of Care;Transfer Training;ADL Adaptive Strategies  Education Method: Verbal  Barriers to Learning: None  Education Outcome: Verbalized understanding           Hand Dominance  Hand Dominance: Right     Treatment included ADL and transfer training.        AM-PAC Score        AM-PAC Inpatient Daily Activity Raw Score: 20 (11/10/22 0951)  AM-PAC Inpatient ADL T-Scale Score : 42.03 (11/10/22 0951)  ADL Inpatient CMS 0-100% Score: 38.32 (11/10/22 0951)  ADL Inpatient CMS G-Code Modifier : Christal Ibrahim (11/10/22 0951)      Goals                           No goals met  Short Term Goals  Time Frame for Short Term Goals: Discharge  Short Term Goal 1: Transfer to/from toilet with supervision  Short Term Goal 2: Stance with supervision x6 min while engaging in AD:/functional mobility  Short Term Goal 3: Lower body dressing with SBA  Patient Goals   Patient goals : Go home       Therapy Time   Individual Concurrent Group Co-treatment   Time In 0821         Time Out 0932         Minutes 71         Timed Code Treatment Minutes: 61 Minutes   Total [address] [facility], OTR/L [id number]

## 2022-11-10 NOTE — PROGRESS NOTES
Pt reports feeling like he has to urinate but is unable to go. Notified MD, orders to bladder scan    Bladder scan result of >200 MD notified, new orders to straight cath     Pt refusing straight cath at this time.   MD notified

## 2022-11-10 NOTE — PROGRESS NOTES
Physical Therapy  Facility/Department: [place] [address] [facility]  Physical Therapy Initial Assessment    Name: Diana Davis  : 1950  MRN: [id number]  Date of Service: 11/10/2022    Discharge Recommendations: Diana Davis scored a 19/24 on the AM-PAC short mobility form. Current research shows that an AM-PAC score of 18 or greater is typically associated with a discharge to the patient's home setting. Please see assessment section for further patient specific details. If patient discharges prior to next session this note will serve as a discharge summary. Please see below for the latest assessment towards goals. PT Equipment Recommendations  Equipment Needed: No      Patient Diagnosis(es): The primary encounter diagnosis was Hyponatremia. Diagnoses of Nocturnal cough with wheeze, Bilateral lower extremity edema, and Abnormal chest x-ray were also pertinent to this visit. Past Medical History:  has a past medical history of AF (atrial fibrillation) ([facility] [address] Anticoagulated, Aortic valve disease, CKD (chronic kidney disease) stage 3, GFR 30-59 ml/min (HCC), GERD (gastroesophageal reflux disease), History of DVT (deep vein thrombosis), HTN (hypertension), Lymphoma ([facility] [address] Nephrolithiasis, VERA (obstructive sleep apnea), Pure hypercholesterolemia, and Sciatica. Past Surgical History:  has a past surgical history that includes Cystoscopy (1-11-10); Colonoscopy (2006 ); Appendectomy; lymph node biopsy (11); lymph node dissection (N/A, ); Tonsillectomy; Upper gastrointestinal endoscopy (N/A, 2022); and Upper gastrointestinal endoscopy (N/A, 2022). Assessment   Assessment: Pt is a 68 yo male that is from home with wife. Pt reports prior independence with all ADLs, functional mobility and ambulation. During today's evaluation pt was CGA for all transfers, ambulation and stairs due to unsteady gait due to injury on the left foot that happened at home.  PT rec the pt return to home with inital 24 hr A. Will continue to follow during length of stay. Treatment Diagnosis: impaired gait mechanics 2/2 left foot injury  Therapy Prognosis: Good  Decision Making: Low Complexity  Requires PT Follow-Up: Yes  Activity Tolerance  Activity Tolerance: Patient tolerated treatment well     Plan   Physcial Therapy Plan  General Plan:  (2-5)  Current Treatment Recommendations: Strengthening, Patient/Caregiver education & training, Therapeutic activities, Balance training, Gait training, Stair training, Functional mobility training, Transfer training, Endurance training, Safety education & training  Safety Devices  Type of Devices: All fall risk precautions in place, Call light within reach, Left in chair, Chair alarm in place, Nurse notified (Simultaneous filing. User may not have seen previous data.)     Restrictions  Position Activity Restriction  Other position/activity restrictions: Up with Assistance, HOB elevated 30 degrees     Subjective   General  Chart Reviewed: Yes  Additional Pertinent Hx: Pt is a 66 yo male that presents to the hospital with co of worsening cough over the best few days. CXR:Streaky bibasilar airspace opacities, possibly infectious. PMH: Afib, Lymphoma, GERD. Referring Practitioner: Sanjay Strong MD  Diagnosis: Hyponatremia  Subjective  Subjective: Pt found sitting EOB with nursing and doctor in the room. Agreeable to therapy.  States \"I need to pee\"         Social/Functional History  Social/Functional History  Lives With: Spouse  Type of Home: House  Home Layout: Two level, 1/2 bath on main level (Laundry on first level)  Home Access: Stairs to enter without rails  Entrance Stairs - Number of Steps: 1  Bathroom Shower/Tub: Tub/Shower unit  Bathroom Toilet: Handicap height  Bathroom Equipment: Hand-held shower (Planning on getting grab bars and shower chair put in)  Bathroom Accessibility: Walker accessible  Home Equipment: [address]  Has the patient had two or more falls in the past year or any fall with injury in the past year?: No  ADL Assistance: Independent  Homemaking Assistance: Independent  Homemaking Responsibilities: Yes  Ambulation Assistance: Independent  Transfer Assistance: Independent  Active : Yes  Mode of Transportation: Car  Occupation: Retired  Type of Occupation: [employer]   Leisure & Hobbies:  Tv, reading books  Vision/Hearing  Vision  Vision: Impaired  Hearing  Hearing: Within functional limits    Cognition   Orientation  Orientation Level: Oriented X4     Objective   Heart Rate: 97  Heart Rate Source: Monitor  BP: (!) 177/83  BP Location: Left upper arm  BP Method: Automatic  Patient Position: Sitting  MAP (Calculated): 114  Resp: 22  SpO2: 100 %  O2 Device: None (Room air)        Gross Assessment  AROM: Generally decreased, functional  Strength: Generally decreased, functional                    Bed mobility  Supine to Sit: Stand by assistance  Scooting: Stand by assistance  Transfers  Sit to Stand: Contact guard assistance (From EOB, Toilet and Recliner)  Stand to Sit: Contact guard assistance (decreased eccentric lowering into chair.)  Bed to Chair: Contact guard assistance  Ambulation  Surface: Level tile  Device: No Device  Assistance: Contact guard assistance  Quality of Gait: Slightly unsteady due to left foot injury, antalgic gait on the left, shuffle steps,  Gait Deviations: Decreased step length;Decreased step height;Slow Mary  Distance: 65ftx2+10ftx6  Stairs/Curb  Stairs?: Yes  Stairs  # Steps : 12  Stairs Height: 6\"  Rails: Bilateral  Assistance: Contact guard assistance  Comment: Recpirocal gait pattern on descend/ascend, very cautious on the descend due to fear of falling     Balance  Sitting - Static: Good  Sitting - Dynamic: Good  Standing - Static: Good  Standing - Dynamic: Good           OutComes Score      AM-PAC Score  AM-PAC Inpatient Mobility Raw Score : 19 (11/10/22 0944)  -PAC Inpatient T-Scale Score : 45.44 (11/10/22 0944)  Mobility Inpatient CMS 0-100% Score: 41.77 (11/10/22 0944)  Mobility Inpatient CMS G-Code Modifier : CK (11/10/22 0944)          Tinneti Score       Goals  Short Term Goals  Time Frame for Short Term Goals: Discharge  Short Term Goal 1: Sit<>Stand Mod I  Short Term Goal 2: Ambulate 150ft with Mod I  Short Term Goal 3: Ascend/descend a flight of steps Mod I  Patient Goals   Patient Goals :  To Return Home       Education  Patient Education  Education Given To: Patient  Education Provided: Role of Therapy  Education Method: Demonstration  Barriers to Learning: None  Education Outcome: Verbalized understanding      Therapy Time   Individual Concurrent Group Co-treatment   Time In [phone]         Time Out 0933         Minutes 76          Timed Code Treatment Minutes:   61    Total Treatment Minutes:  [address], [place] #[phone]

## 2022-11-10 NOTE — PROGRESS NOTES
4 Eyes Admission Assessment     I agree as the admission nurse that 2 RN's have performed a thorough Head to Toe Skin Assessment on the patient. ALL assessment sites listed below have been assessed on admission. Areas assessed by both nurses: Víctor Blanton  [x]   Head, Face, and Ears   [x]   Shoulders, Back, and Chest  [x]   Arms, Elbows, and Hands   [x]   Coccyx, Sacrum, and Ischium  [x]   Legs, Feet, and Heels        Does the Patient have Skin Breakdown?   No         Ish Prevention initiated:  No   Wound Care Orders initiated:  No      [facility] nurse consulted for Pressure Injury (Stage 3,4, Unstageable, DTI, NWPT, and Complex wounds) or Ish score 18 or lower:  No      Nurse 1 eSignature: Electronically signed by Ravin Green RN on 11/10/22 at 1:57 AM EST    **SHARE this note so that the co-signing nurse is able to place an eSignature**    Nurse 2 eSignature: Electronically signed by Davida Thomas RN on 11/10/22 at 1:28 AM EST

## 2022-11-10 NOTE — PROGRESS NOTES
Patient refused to be placed on external urinary catheter. Uses urinal instead and most of the times it will leak over the sheets and under pads so urine output is not accurately measured.

## 2022-11-10 NOTE — DISCHARGE INSTRUCTIONS
PLEASE FOLLOW UP OUTPATIENT WITH UROLOGY FOR REMOVAL OF HOWELL CATHETER AS WELL AS ULTRASOUND OF YOUR KIDNEYS. Extra Heart Failure sites:   https://Shoozy.iexerci.se/ --- this is American Heart Association interactive Healthier Living with Heart Failure guidebook. Please copy and paste link into search bar. Use your mouse to scroll through the pages. Lots and lots of info / tips    HF [place] john  --- free smart phone john available for [employer] and [employer]. Use your phone to track sodium / fluid intake,  symptoms, weight, etc.    [employer] - website-- [employer] is a dialysis company. All dialysis patients follow a renal diet which IS low sodium!! This website offers free seasonal cookbooks.   Each quarter, they will release 25-30 new recipes with a breakdown of calories, sodium, glucose, etc    www.eatingwell.com/recipes -- more free recipes

## 2022-11-10 NOTE — RT PROTOCOL NOTE
RT Inhaler-Nebulizer Bronchodilator Protocol Note    There is a bronchodilator order in the chart from a provider indicating to follow the RT Bronchodilator Protocol and there is an Initiate RT Inhaler-Nebulizer Bronchodilator Protocol order as well (see protocol at bottom of note). CXR Findings:  XR CHEST PORTABLE    Result Date: 11/9/2022  Streaky bibasilar airspace opacities, possibly infectious. Mild cardiomegaly. The findings from the last RT Protocol Assessment were as follows:   History Pulmonary Disease: None or smoker <15 pack years  Respiratory Pattern: Dyspnea on exertion or RR 21-25 bpm  Breath Sounds: Slightly diminished and/or crackles  Cough: Strong, spontaneous, non-productive  Indication for Bronchodilator Therapy: Decreased or absent breath sounds  Bronchodilator Assessment Score: 4    Aerosolized bronchodilator medication orders have been revised according to the RT Inhaler-Nebulizer Bronchodilator Protocol below. Respiratory Therapist to perform RT Therapy Protocol Assessment initially then follow the protocol. Repeat RT Therapy Protocol Assessment PRN for score 0-3 or on second treatment, BID, and PRN for scores above 3. No Indications - adjust the frequency to every 6 hours PRN wheezing or bronchospasm, if no treatments needed after 48 hours then discontinue using Per Protocol order mode. If indication present, adjust the RT bronchodilator orders based on the Bronchodilator Assessment Score as indicated below. Use Inhaler orders unless patient has one or more of the following: on home nebulizer, not able to hold breath for 10 seconds, is not alert and oriented, cannot activate and use MDI correctly, or respiratory rate 25 breaths per minute or more, then use the equivalent nebulizer order(s) with same Frequency and PRN reasons based on the score. If a patient is on this medication at home then do not decrease Frequency below that used at home.     0-3 - enter or revise RT bronchodilator order(s) to equivalent RT Bronchodilator order with Frequency of every 4 hours PRN for wheezing or increased work of breathing using Per Protocol order mode. 4-6 - enter or revise RT Bronchodilator order(s) to two equivalent RT bronchodilator orders with one order with BID Frequency and one order with Frequency of every 4 hours PRN wheezing or increased work of breathing using Per Protocol order mode. 7-10 - enter or revise RT Bronchodilator order(s) to two equivalent RT bronchodilator orders with one order with TID Frequency and one order with Frequency of every 4 hours PRN wheezing or increased work of breathing using Per Protocol order mode. 11-13 - enter or revise RT Bronchodilator order(s) to one equivalent RT bronchodilator order with QID Frequency and an Albuterol order with Frequency of every 4 hours PRN wheezing or increased work of breathing using Per Protocol order mode. Greater than 13 - enter or revise RT Bronchodilator order(s) to one equivalent RT bronchodilator order with every 4 hours Frequency and an Albuterol order with Frequency of every 2 hours PRN wheezing or increased work of breathing using Per Protocol order mode. RT to enter RT Home Evaluation for COPD & MDI Assessment order using Per Protocol order mode.     Electronically signed by Yordy Joya RCP on 11/10/2022 at 3:30 PM

## 2022-11-10 NOTE — PLAN OF CARE
Problem: Discharge Planning  Goal: Discharge to home or other facility with appropriate resources  Outcome: Progressing  Flowsheets (Taken 11/10/2022 0206)  Discharge to home or other facility with appropriate resources:   Identify barriers to discharge with patient and caregiver   Arrange for needed discharge resources and transportation as appropriate   Identify discharge learning needs (meds, wound care, etc)   Arrange for interpreters to assist at discharge as needed   Refer to discharge planning if patient needs post-hospital services based on physician order or complex needs related to functional status, cognitive ability or social support system     Problem: Respiratory - Adult  Goal: Achieves optimal ventilation and oxygenation  Outcome: Progressing  Flowsheets (Taken 11/10/2022 0207)  Achieves optimal ventilation and oxygenation:   Assess for changes in respiratory status   Assess for changes in mentation and behavior   Position to facilitate oxygenation and minimize respiratory effort   Oxygen supplementation based on oxygen saturation or arterial blood gases   Encourage broncho-pulmonary hygiene including cough, deep breathe, incentive spirometry   Assess the need for suctioning and aspirate as needed   Assess and instruct to report shortness of breath or any respiratory difficulty   Respiratory therapy support as indicated  Note: Maintains oxygen saturation 93% and above on room air.  Patient is Having dry cough, and some exertional dyspnea on Iv antibiotics     Problem: Cardiovascular - Adult  Goal: Maintains optimal cardiac output and hemodynamic stability  Outcome: Progressing  Flowsheets (Taken 11/10/2022 0207)  Maintains optimal cardiac output and hemodynamic stability:   Monitor blood pressure and heart rate   Monitor urine output and notify Licensed Independent Practitioner for values outside of normal range   Assess for signs of decreased cardiac output  Note: Patient has some high blood pressure readings. On amlodipine now and PRN hydralazine. Atrial fibrillation on telemetry     Problem: Cardiovascular - Adult  Goal: Absence of cardiac dysrhythmias or at baseline  Outcome: Progressing     Problem: Metabolic/Fluid and Electrolytes - Adult  Goal: Electrolytes maintained within normal limits  Outcome: Progressing  Flowsheets (Taken 11/10/2022 0209)  Electrolytes maintained within normal limits:   Monitor labs and assess patient for signs and symptoms of electrolyte imbalances   Administer electrolyte replacement as ordered   Monitor response to electrolyte replacements, including repeat lab results as appropriate     Problem: Metabolic/Fluid and Electrolytes - Adult  Goal: Hemodynamic stability and optimal renal function maintained  Outcome: Progressing  Flowsheets (Taken 11/10/2022 0209)  Hemodynamic stability and optimal renal function maintained:   Monitor labs and assess for signs and symptoms of volume excess or deficit   Monitor intake, output and patient weight   Monitor urine specific gravity, serum osmolarity and serum sodium as indicated or ordered   Monitor response to interventions for patient's volume status, including labs, urine output, blood pressure (other measures as available)  Note: on strict Intake and output recordings and daily weighs     Problem: Skin/Tissue Integrity  Goal: Absence of new skin breakdown  Description: 1. Monitor for areas of redness and/or skin breakdown  2. Assess vascular access sites hourly  3. Every 4-6 hours minimum:  Change oxygen saturation probe site  4. Every 4-6 hours:  If on nasal continuous positive airway pressure, respiratory therapy assess nares and determine need for appliance change or resting period.   Outcome: Progressing  Note: No skin issues noted     Problem: Safety - Adult  Goal: Free from fall injury  Outcome: Progressing  Flowsheets (Taken 11/10/2022 0209)  Free From Fall Injury:   Instruct family/caregiver on patient safety   Based on caregiver fall risk screen, instruct family/caregiver to ask for assistance with transferring infant if caregiver noted to have fall risk factors  Note: Call light within reach. Call attended. Kept bed wheels locked and in lowest position. Bed alarm ON.  Kept On standby assist when moving out of bed     Problem: ABCDS Injury Assessment  Goal: Absence of physical injury  Outcome: Progressing  Flowsheets (Taken 11/10/2022 0207)  Absence of Physical Injury: Implement safety measures based on patient assessment

## 2022-11-10 NOTE — PROGRESS NOTES
Progress Note    Admit Date: 11/9/2022  Day: 2  Diet: ADULT DIET; Regular; Low Sodium (2 gm); 2000 ml    CC: cough    Interval history:   Patient was seen and examined at bedside. Appears to be short of breath when talking. He still denies any form of cardiac involvement despite  significant bilateral lower extremity edema, distended abdomen with fluid wave and pulmonary crackles. He however has no oxygen requirements at this time. He has been afebrile and remains on Eliquis. Medications:     Scheduled Meds:   tamsulosin  0.4 mg Oral Daily    apixaban  5 mg Oral BID    metoprolol tartrate  25 mg Oral BID    furosemide  20 mg IntraVENous TID    cefTRIAXone (ROCEPHIN) IV  2,000 mg IntraVENous Q24H    famotidine  20 mg Oral Nightly    sodium chloride flush  5-40 mL IntraVENous 2 times per day    doxycycline (VIBRAMYCIN) IV  100 mg IntraVENous Q12H    ipratropium-albuterol  1 ampule Inhalation 4x daily    budesonide  250 mcg Nebulization BID    amLODIPine  5 mg Oral Daily     Continuous Infusions:   sodium chloride 25 mL (11/09/22 2320)     PRN Meds:guaiFENesin-dextromethorphan, sodium chloride flush, sodium chloride, polyethylene glycol, acetaminophen **OR** acetaminophen, perflutren lipid microspheres, hydrALAZINE    Objective:   Vitals:   T-max:  Patient Vitals for the past 8 hrs:   BP Temp Temp src Pulse Resp SpO2 Weight   11/10/22 0728 (!) 177/83 98.1 °F (36.7 °C) Oral 97 22 100 % --   11/10/22 0529 (!) 159/75 98.3 °F (36.8 °C) Oral 87 20 96 % --   11/10/22 0434 -- -- -- -- -- -- 218 lb 11.1 oz (99.2 kg)       Intake/Output Summary (Last 24 hours) at 11/10/2022 1156  Last data filed at 11/10/2022 0800  Gross per 24 hour   Intake 150 ml   Output 440 ml   Net -290 ml       Review of Systems   All other systems reviewed and are negative. Physical Exam  Vitals reviewed. Constitutional:       General: He is awake. He is not in acute distress. Appearance: He is overweight. He is ill-appearing.  He is not toxic-appearing. Pulmonary:      Effort: Pulmonary effort is normal.      Breath sounds: Examination of the right-upper field reveals rhonchi. Examination of the right-lower field reveals decreased breath sounds. Examination of the left-lower field reveals decreased breath sounds. Decreased breath sounds and rhonchi present. Abdominal:      General: There is distension. Palpations: There is fluid wave. There is no shifting dullness. Tenderness: There is no abdominal tenderness. Musculoskeletal:      Right lower le+ Pitting Edema present. Left lower le+ Pitting Edema present. Neurological:      General: No focal deficit present. Mental Status: He is alert and oriented to person, place, and time. Sensory: Sensation is intact. Motor: Motor function is intact. Psychiatric:         Attention and Perception: Attention and perception normal.         Mood and Affect: Mood normal.         Speech: Speech normal.       LABS:    CBC:   Recent Labs     11/09/22  0945 11/10/22  0452   WBC 10.7 11.6*   HGB 12.5* 12.2*   HCT 39.3* 37.8*    258   MCV 74.6* 73.4*     Renal:    Recent Labs     22  1129 11/09/22  2055 11/10/22  0452   * 125*  126* 126*   K 3.5 4.1 3.9   CL 92* 89* 87*   CO2 20* 23 20*   BUN 21* 24* 23*   CREATININE 1.0 1.2 1.1   GLUCOSE 96 124* 114*   CALCIUM 7.8* 8.9 9.2   MG  --   --  1.80   ANIONGAP 13 14 19*     Hepatic:   Recent Labs     22   AST 25   ALT 15   BILITOT 1.1*   BILIDIR 0.5*   PROT 7.0   LABALBU 4.1   ALKPHOS 82     Troponin:   Recent Labs     22   TROPONINI <0.01     BNP: No results for input(s): BNP in the last 72 hours. Lipids: No results for input(s): CHOL, HDL in the last 72 hours. Invalid input(s): LDLCALCU, TRIGLYCERIDE  ABGs:  No results for input(s): PHART, QIV6NFV, PO2ART, IEB4HMS, BEART, THGBART, D0SGWTAK, AVN5DSM in the last 72 hours.     INR: No results for input(s): INR in the last 72 hours. Lactate: No results for input(s): LACTATE in the last 72 hours. Cultures:  -----------------------------------------------------------------  RAD:   XR CHEST PORTABLE   Final Result      Streaky bibasilar airspace opacities, possibly infectious. Mild cardiomegaly. Assessment/Plan:   Clau Navarro is a 48-year-old male past medical history of hypertension hyperlipidemia A. fib on Eliquis presented to the ED with cough and shortness of breath. Suspected new onset heart failure   Patient has significant bilateral pitting edema and abdominal distension with fluid wave. His last Echo was 7/16/20 with EF 50-55%. Follow up repeat echo  He has upper lobe crackles with decreased air movement in lower lobes. ProBNP is 4944 on admission  Trend proBNP every third day  Continue lasix 20 mg IV TID  Monitor daily standing weight  Follow strict I/os  Monitor daily electrolytes and BMPs  Continue respiratory therapy as needed for SOB  Cardiology has been consulted. Follow up recommendations    Community acquired pneumonia  Patient has cough that was productive of reddish sputum. COVID and Influenza were negative in the ED  CXR was concerning for infectious etiology  Procal mildly elevated at 0.19, white count mildly elevated this morning  Follow-up blood cultures are pending  Follow-up MRSA nasal probe pending  Follow-up Legionella antigen pending  Follow-up strep antigen pending  Continue with doxycycline 100 mg Q12 for 6 more days  Start ceftriaxone for 4 days  Await for cultures and sensitivities and make appropriate adjustments.     Moderate Hyponatremia most likely due to volume overload  Serum sodium now 126  Follow up serum osmolarity is 272  Follow up urine osmolarity is 614  Follow up urine sodium <20  Monitor daily BMPs  Continue on lasix    Chronic Atrial fibrillation  Rate is now controlled   Continue on telemetry  Continue on Eliquis 2.5 mg BID  Continue on Lopressor 25 mg BID    Essential hypertension  Continue home amlodipine     GERD  Continue famotidine 20 mg nightly     Microcytic Anemia  Hemoglobin 12.2  Follow iron studies showed low iron of 49, and saturation of 15 but normal TIBC of 330. Follow up daily CBCs     Diffuse large cell B cell non-Hodgkin's lymphoma, status post six cycles of Rituxan/CHOP, completed 09/2011    Barriers to Discharge:  Patient is volume overloaded and needs diuresis and cardiology evaluation. Code Status: Full code  FEN: Low sodium diet  PPX: Lovenox  DISPO: Fuad Browning MD, PGY-1  11/10/22  11:56 AM    This patient has been staffed and discussed with Darrick Kim MD.     Patient seen and examined, plan of care discussed with residents. Agree with their assessment and plan with following addendum:  Cont with IVF lasix for CHF and hypervolemic hyponatremia. Patient is having urinary retention- urology consulted. Due to elevated inflammatory markers we will continue antibiotic coverage as well. Anticoagulated with eliquis.      Darrick Kim MD

## 2022-11-11 ENCOUNTER — APPOINTMENT (OUTPATIENT)
Dept: ULTRASOUND IMAGING | Age: 72
DRG: 193 | End: 2022-11-11
Payer: MEDICARE

## 2022-11-11 LAB
ANION GAP SERPL CALCULATED.3IONS-SCNC: 15 MMOL/L (ref 3–16)
BASOPHILS ABSOLUTE: 0.1 K/UL (ref 0–0.2)
BASOPHILS ABSOLUTE: 0.1 K/UL (ref 0–0.2)
BASOPHILS RELATIVE PERCENT: 0.5 %
BASOPHILS RELATIVE PERCENT: 0.6 %
BUN BLDV-MCNC: 30 MG/DL (ref 7–20)
CALCIUM SERPL-MCNC: 9 MG/DL (ref 8.3–10.6)
CHLORIDE BLD-SCNC: 93 MMOL/L (ref 99–110)
CO2: 22 MMOL/L (ref 21–32)
CREAT SERPL-MCNC: 1.5 MG/DL (ref 0.8–1.3)
EOSINOPHILS ABSOLUTE: 0 K/UL (ref 0–0.6)
EOSINOPHILS ABSOLUTE: 0.1 K/UL (ref 0–0.6)
EOSINOPHILS RELATIVE PERCENT: 0.3 %
EOSINOPHILS RELATIVE PERCENT: 0.6 %
GFR SERPL CREATININE-BSD FRML MDRD: 49 ML/MIN/{1.73_M2}
GLUCOSE BLD-MCNC: 117 MG/DL (ref 70–99)
HCT VFR BLD CALC: 37.2 % (ref 40.5–52.5)
HCT VFR BLD CALC: 39.2 % (ref 40.5–52.5)
HEMOGLOBIN: 11.7 G/DL (ref 13.5–17.5)
HEMOGLOBIN: 12.7 G/DL (ref 13.5–17.5)
L. PNEUMOPHILA SEROGP 1 UR AG: NORMAL
LYMPHOCYTES ABSOLUTE: 0.7 K/UL (ref 1–5.1)
LYMPHOCYTES ABSOLUTE: 0.8 K/UL (ref 1–5.1)
LYMPHOCYTES RELATIVE PERCENT: 6.5 %
LYMPHOCYTES RELATIVE PERCENT: 7.4 %
MCH RBC QN AUTO: 23.5 PG (ref 26–34)
MCH RBC QN AUTO: 24.1 PG (ref 26–34)
MCHC RBC AUTO-ENTMCNC: 31.5 G/DL (ref 31–36)
MCHC RBC AUTO-ENTMCNC: 32.5 G/DL (ref 31–36)
MCV RBC AUTO: 74.3 FL (ref 80–100)
MCV RBC AUTO: 74.6 FL (ref 80–100)
MONOCYTES ABSOLUTE: 1.2 K/UL (ref 0–1.3)
MONOCYTES ABSOLUTE: 1.5 K/UL (ref 0–1.3)
MONOCYTES RELATIVE PERCENT: 11.9 %
MONOCYTES RELATIVE PERCENT: 14 %
MRSA SCREEN RT-PCR: NORMAL
NEUTROPHILS ABSOLUTE: 8.1 K/UL (ref 1.7–7.7)
NEUTROPHILS ABSOLUTE: 8.5 K/UL (ref 1.7–7.7)
NEUTROPHILS RELATIVE PERCENT: 78.7 %
NEUTROPHILS RELATIVE PERCENT: 79.5 %
PDW BLD-RTO: 20.6 % (ref 12.4–15.4)
PDW BLD-RTO: 21.3 % (ref 12.4–15.4)
PLATELET # BLD: 241 K/UL (ref 135–450)
PLATELET # BLD: 248 K/UL (ref 135–450)
PMV BLD AUTO: 7.8 FL (ref 5–10.5)
PMV BLD AUTO: 7.9 FL (ref 5–10.5)
POTASSIUM REFLEX MAGNESIUM: 3.7 MMOL/L (ref 3.5–5.1)
RBC # BLD: 4.98 M/UL (ref 4.2–5.9)
RBC # BLD: 5.27 M/UL (ref 4.2–5.9)
SODIUM BLD-SCNC: 130 MMOL/L (ref 136–145)
STREP PNEUMONIAE ANTIGEN, URINE: NORMAL
WBC # BLD: 10.2 K/UL (ref 4–11)
WBC # BLD: 10.8 K/UL (ref 4–11)

## 2022-11-11 PROCEDURE — 6370000000 HC RX 637 (ALT 250 FOR IP)

## 2022-11-11 PROCEDURE — 85025 COMPLETE CBC W/AUTO DIFF WBC: CPT

## 2022-11-11 PROCEDURE — 2060000000 HC ICU INTERMEDIATE R&B

## 2022-11-11 PROCEDURE — 97116 GAIT TRAINING THERAPY: CPT

## 2022-11-11 PROCEDURE — 6370000000 HC RX 637 (ALT 250 FOR IP): Performed by: INTERNAL MEDICINE

## 2022-11-11 PROCEDURE — 6360000002 HC RX W HCPCS: Performed by: INTERNAL MEDICINE

## 2022-11-11 PROCEDURE — 6360000002 HC RX W HCPCS

## 2022-11-11 PROCEDURE — 94761 N-INVAS EAR/PLS OXIMETRY MLT: CPT

## 2022-11-11 PROCEDURE — 99233 SBSQ HOSP IP/OBS HIGH 50: CPT | Performed by: INTERNAL MEDICINE

## 2022-11-11 PROCEDURE — 76770 US EXAM ABDO BACK WALL COMP: CPT

## 2022-11-11 PROCEDURE — 97530 THERAPEUTIC ACTIVITIES: CPT

## 2022-11-11 PROCEDURE — 2580000003 HC RX 258

## 2022-11-11 PROCEDURE — 2500000003 HC RX 250 WO HCPCS

## 2022-11-11 PROCEDURE — 36415 COLL VENOUS BLD VENIPUNCTURE: CPT

## 2022-11-11 PROCEDURE — 97110 THERAPEUTIC EXERCISES: CPT

## 2022-11-11 PROCEDURE — 80048 BASIC METABOLIC PNL TOTAL CA: CPT

## 2022-11-11 PROCEDURE — 51702 INSERT TEMP BLADDER CATH: CPT

## 2022-11-11 PROCEDURE — 94640 AIRWAY INHALATION TREATMENT: CPT

## 2022-11-11 RX ORDER — FUROSEMIDE 10 MG/ML
20 INJECTION INTRAMUSCULAR; INTRAVENOUS 2 TIMES DAILY
Status: DISCONTINUED | OUTPATIENT
Start: 2022-11-11 | End: 2022-11-12 | Stop reason: HOSPADM

## 2022-11-11 RX ORDER — POTASSIUM CHLORIDE 20 MEQ/1
40 TABLET, EXTENDED RELEASE ORAL ONCE
Status: COMPLETED | OUTPATIENT
Start: 2022-11-11 | End: 2022-11-11

## 2022-11-11 RX ORDER — METOPROLOL SUCCINATE 25 MG/1
25 TABLET, EXTENDED RELEASE ORAL DAILY
Status: DISCONTINUED | OUTPATIENT
Start: 2022-11-11 | End: 2022-11-11

## 2022-11-11 RX ORDER — LANOLIN ALCOHOL/MO/W.PET/CERES
400 CREAM (GRAM) TOPICAL ONCE
Status: COMPLETED | OUTPATIENT
Start: 2022-11-11 | End: 2022-11-11

## 2022-11-11 RX ORDER — CARVEDILOL 6.25 MG/1
6.25 TABLET ORAL 2 TIMES DAILY WITH MEALS
Status: DISCONTINUED | OUTPATIENT
Start: 2022-11-11 | End: 2022-11-12

## 2022-11-11 RX ORDER — FUROSEMIDE 10 MG/ML
20 INJECTION INTRAMUSCULAR; INTRAVENOUS 2 TIMES DAILY
Qty: 30 EACH | Refills: 2 | Status: CANCELLED | OUTPATIENT
Start: 2022-11-11

## 2022-11-11 RX ADMIN — BUDESONIDE 250 MCG: 0.25 SUSPENSION RESPIRATORY (INHALATION) at 08:00

## 2022-11-11 RX ADMIN — GUAIFENESIN SYRUP AND DEXTROMETHORPHAN 5 ML: 100; 10 SYRUP ORAL at 23:18

## 2022-11-11 RX ADMIN — CARVEDILOL 6.25 MG: 6.25 TABLET, FILM COATED ORAL at 11:50

## 2022-11-11 RX ADMIN — SODIUM CHLORIDE, PRESERVATIVE FREE 10 ML: 5 INJECTION INTRAVENOUS at 21:15

## 2022-11-11 RX ADMIN — APIXABAN 5 MG: 5 TABLET, FILM COATED ORAL at 21:15

## 2022-11-11 RX ADMIN — FUROSEMIDE 20 MG: 10 INJECTION, SOLUTION INTRAMUSCULAR; INTRAVENOUS at 11:50

## 2022-11-11 RX ADMIN — BUDESONIDE 250 MCG: 0.25 SUSPENSION RESPIRATORY (INHALATION) at 20:36

## 2022-11-11 RX ADMIN — DOXYCYCLINE 100 MG: 100 INJECTION, POWDER, LYOPHILIZED, FOR SOLUTION INTRAVENOUS at 22:54

## 2022-11-11 RX ADMIN — CEFTRIAXONE 2000 MG: 2 INJECTION, POWDER, FOR SOLUTION INTRAMUSCULAR; INTRAVENOUS at 13:34

## 2022-11-11 RX ADMIN — FAMOTIDINE 20 MG: 20 TABLET, FILM COATED ORAL at 21:15

## 2022-11-11 RX ADMIN — APIXABAN 5 MG: 5 TABLET, FILM COATED ORAL at 10:11

## 2022-11-11 RX ADMIN — FUROSEMIDE 20 MG: 10 INJECTION, SOLUTION INTRAMUSCULAR; INTRAVENOUS at 17:52

## 2022-11-11 RX ADMIN — Medication 400 MG: at 11:50

## 2022-11-11 RX ADMIN — TAMSULOSIN HYDROCHLORIDE 0.4 MG: 0.4 CAPSULE ORAL at 10:11

## 2022-11-11 RX ADMIN — POTASSIUM CHLORIDE 40 MEQ: 1500 TABLET, EXTENDED RELEASE ORAL at 11:50

## 2022-11-11 RX ADMIN — IPRATROPIUM BROMIDE AND ALBUTEROL SULFATE 1 AMPULE: 2.5; .5 SOLUTION RESPIRATORY (INHALATION) at 20:36

## 2022-11-11 RX ADMIN — SODIUM CHLORIDE, PRESERVATIVE FREE 10 ML: 5 INJECTION INTRAVENOUS at 10:14

## 2022-11-11 RX ADMIN — IPRATROPIUM BROMIDE AND ALBUTEROL SULFATE 1 AMPULE: 2.5; .5 SOLUTION RESPIRATORY (INHALATION) at 08:00

## 2022-11-11 RX ADMIN — DOXYCYCLINE 100 MG: 100 INJECTION, POWDER, LYOPHILIZED, FOR SOLUTION INTRAVENOUS at 10:31

## 2022-11-11 RX ADMIN — CARVEDILOL 6.25 MG: 6.25 TABLET, FILM COATED ORAL at 17:52

## 2022-11-11 NOTE — CONSULTS
Interval History and plan:      Blood pressure is stable. Urine output is 378 5 mL. He is in a negative balance. Plan: Meza catheter is placed now. Appreciate urology consult. Daily renal panel. Continue Lasix for now as per cardiology    Acute elevation of creatinine is more than likely from aggressive diuresis plus there is evidence for hydronephrosis on ultrasound. I will closely monitor and adjust diuretics as needed. Assessment :        Acute kidney injury: Creatinine increased from 1.0>>1.5. Patient is getting diuretics for history of congestive heart failure. Weight has improved 225 >> 214 pounds. proBNP was 4944. LFT is normal.  Urine sodium is less than 20 with osmolality of 614. UA shows some proteinuria. Ultrasound the kidney showed moderate bilateral hydronephrosis. Bilateral kidney size were normal.  Has bilateral renal cysts. Hyponatremia: Is improving with diuresis. 125>> 130    Congestive heart failure: Monitored by cardiology. He is currently on Lasix. Echocardiogram showed EF of 50 to 55%. IVC is dilated. He has pulmonary hypertension. He has history of kidney stones. [address] [place] [address] Nephrology would like to thank Suleiman Carlisle MD   for opportunity to serve this patient      Please call with questions at-   24 Hrs Answering service [phone] or  7 am- 5 pm via Perfect serve or cell phone  Azalea Salazar MD          CC/reason for consult :     Acute kidney injury. HPI :     Violette Storm is a 67 y.o. male presented to   the hospital on 11/9/2022 with cough. he has past medical history of stage III chronic kidney disease, GERD, hypertension, B-cell lymphoma, history of kidney stone, history of obstructive sleep apnea and atrial fibrillation. he presented to the hospital with a history of cough. It was associated with sputum. In the ER he was hypertensive. proBNP was elevated.   Chest x-ray showed bibasilar opacities. He was initiated on antibiotics. He is currently getting Lasix 20 mg IV 3 times daily. He is initiated on diuresis and his weight is down to 214 pounds. Cardiology is following the case. Creatinine increased from 1.0-1.5.  he is also hyponatremic. I was called for further management of acute kidney injury. He is also getting antibiotics for community-acquired pneumonia. Urology was consulted for urinary retention. He was placed on Flomax. He diffuse indwelling Meza catheter. ROS:     Seen with- resident    positives in bold   Constitutional:  fever, chills, weakness, weight change, fatigue  Skin:  rash, pruritus, hair loss, bruising, dry skin, petechiae  Head, Face, Neck   headaches, swelling,  cervical adenopathy  Respiratory: shortness of breath, cough, or wheezing  Cardiovascular: chest pain, palpitations, dizzy, edema  Gastrointestinal: nausea, vomiting, diarrhea, constipation,belly pain    Yellow skin, blood in stool  Musculoskeletal:  back pain, muscle weakness, gait problems,       joint pain or swelling. Genitourinary:  dysuria, poor urine flow, flank pain, blood in urine  Neurologic:  vertigo, TIA'S, syncope, seizures, focal weakness  Psychosocial:  insomnia, anxiety, or depression.   Additional positive findings:                    All other remaining systems are negative or unable to obtain        PMH/PSH/SH/Family History:     Past Medical History:   Diagnosis Date    AF (atrial fibrillation) ([facility])     Anticoagulated     Aortic valve disease     leaking no problems    CKD (chronic kidney disease) stage 3, GFR 30-59 ml/min ([facility])     GERD (gastroesophageal reflux disease)     History of DVT (deep vein thrombosis)     HTN (hypertension)     Lymphoma ([facility] [address] 2015    B cell    Nephrolithiasis     VERA (obstructive sleep apnea)     Pure hypercholesterolemia        Past Surgical History:   Procedure Laterality Date    APPENDECTOMY      COLONOSCOPY  8/18/2006      Opal Andrews - internal hemorrhoids , hyperplastic polyp     CYSTOSCOPY  1-11-10     Dr. Agnieszka Asif ureteral stent     LYMPH NODE BIOPSY  5/11/11    RIGHT CERVICAL    LYMPH NODE DISSECTION N/A 2011    TONSILLECTOMY      UPPER GASTROINTESTINAL ENDOSCOPY N/A 7/23/2022    EGD DIAGNOSTIC ONLY performed by Adam Pacheco MD at [address] [facility] 9/21/2022    EGD BIOPSY performed by Suresh Rivera MD at [facility]        reports that he has never smoked. He has never used smokeless tobacco. He reports that he does not drink alcohol and does not use drugs. family history includes Heart Disease in his mother.          Medication:     Current Facility-Administered Medications: carvedilol (COREG) tablet 6.25 mg, 6.25 mg, Oral, BID WC  furosemide (LASIX) injection 20 mg, 20 mg, IntraVENous, BID  guaiFENesin-dextromethorphan (ROBITUSSIN DM) 100-10 MG/5ML syrup 5 mL, 5 mL, Oral, Q4H PRN  tamsulosin (FLOMAX) capsule 0.4 mg, 0.4 mg, Oral, Daily  apixaban (ELIQUIS) tablet 5 mg, 5 mg, Oral, BID  cefTRIAXone (ROCEPHIN) 2,000 mg in dextrose 5 % 50 mL IVPB mini-bag, 2,000 mg, IntraVENous, Q24H  labetalol (NORMODYNE;TRANDATE) injection 10 mg, 10 mg, IntraVENous, Q6H PRN  ipratropium-albuterol (DUONEB) nebulizer solution 1 ampule, 1 ampule, Inhalation, BID  famotidine (PEPCID) tablet 20 mg, 20 mg, Oral, Nightly  sodium chloride flush 0.9 % injection 5-40 mL, 5-40 mL, IntraVENous, 2 times per day  sodium chloride flush 0.9 % injection 5-40 mL, 5-40 mL, IntraVENous, PRN  0.9 % sodium chloride infusion, , IntraVENous, PRN  polyethylene glycol (GLYCOLAX) packet 17 g, 17 g, Oral, Daily PRN  acetaminophen (TYLENOL) tablet 650 mg, 650 mg, Oral, Q6H PRN **OR** acetaminophen (TYLENOL) suppository 650 mg, 650 mg, Rectal, Q6H PRN  perflutren lipid microspheres (DEFINITY) injection 1.5 mL, 1.5 mL, IntraVENous, ONCE PRN  doxycycline (VIBRAMYCIN) 100 mg in dextrose 5 % 100 mL IVPB, 100 mg, IntraVENous, Q12H  budesonide (PULMICORT) nebulizer suspension 250 mcg, 250 mcg, Nebulization, BID  hydrALAZINE (APRESOLINE) injection 10 mg, 10 mg, IntraVENous, Q6H PRN       Vitals :     Vitals:    11/11/22 1147   BP: (!) 144/75   Pulse: 66   Resp: 18   Temp: 98.8 °F (37.1 °C)   SpO2: 96%       I & O :       Intake/Output Summary (Last 24 hours) at 11/11/2022 1458  Last data filed at 11/11/2022 0654  Gross per 24 hour   Intake 670 ml   Output 3575 ml   Net -2905 ml        Physical Examination :     General appearance: Anxious- no, distressed- no, in good spirits-    HEENT: Lips- normal, teeth- ok , oral mucosa- moist  Neck : Mass- no, appears symmetrical, JVD- not visible  Respiratory: Respiratory effort-  normal, wheeze- no, crackles -   Cardiovascular:  Ausculation- No M/R/G, Edema no  Abdomen: visible mass- no, distention- no, scar- no, tenderness- no                            hepatosplenomegaly-  no  Musculoskeletal:  clubbing no,cyanosis- no , digital ischemia- no                           muscle strength- grossly normal , tone - grossly normal  Skin: rashes- no , ulcers- no, induration- no, tightening - no  Psychiatric:  Judgement and insight- normal           AAO X 3  Additional finding:      LABS:     Recent Labs     11/10/22  0452 11/10/22  1923 11/11/22  0735   WBC 11.6* 11.5* 10.8   HGB 12.2* 12.1* 11.7*   HCT 37.8* 38.2* 37.2*    245 241     Recent Labs     11/09/22  2055 11/10/22  0452 11/11/22  0735   *  126* 126* 130*   K 4.1 3.9 3.7   CL 89* 87* 93*   CO2 23 20* 22   BUN 24* 23* 30*   CREATININE 1.2 1.1 1.5*   GLUCOSE 124* 114* 117*   MG  --  1.80  --       Nephrology  [address] [place] [address], [address]  Office: [phone]  Fax: [phone]

## 2022-11-11 NOTE — PROGRESS NOTES
Bladder scan indicated 851 ml; in & out straight cath with result of 1075 ml, clear pale yellow urine w/small streak of blood noted ; pt aware of urology consult ordered

## 2022-11-11 NOTE — CONSULTS
Urology Attending Consult Note      Reason for Consultation: Retention    History: 68 yo M never seen in our office admitted to [facility] with CAP and new onset heart failure. He does not report any issues with urinating prior to his hospitalization. He did not urinate yesterday and was noted to have >1L of urine on bladder scan and was straight cathed. Started on flomax. Family History, Social History, Review of Systems:  Reviewed and agreed to as per chart    Vitals:  BP (!) 150/75   Pulse 66   Temp 98.2 °F (36.8 °C) (Oral)   Resp 19   Ht 6' 1\" (1.854 m)   Wt 214 lb 4.6 oz (97.2 kg)   SpO2 97%   BMI 28.27 kg/m²   Temp  Av.3 °F (36.8 °C)  Min: 98 °F (36.7 °C)  Max: 98.8 °F (37.1 °C)    Intake/Output Summary (Last 24 hours) at 2022 0917  Last data filed at 2022 0654  Gross per 24 hour   Intake 970 ml   Output 3775 ml   Net -2805 ml         Physical:  Well developed, well nourished in no acute distress  Mood indicates no abnormalities. Pt doesnt appear depressed  Orientated to time and place  Neck is supple, trachea is midline  Respiratory effort is normal  Cardiovascular show no extremity swelling  Abdomen no masses or hernias are palpated, there is no tenderness. Liver and Spleen appear normal.  Skin show no abnormal lesions  Lymph nodes are not palpated in the inguinal, neck, or axillary area.       Labs:  WBC:    Lab Results   Component Value Date/Time    WBC 10.8 2022 07:35 AM     Hemoglobin/Hematocrit:    Lab Results   Component Value Date/Time    HGB 11.7 2022 07:35 AM    HCT 37.2 2022 07:35 AM     BMP:    Lab Results   Component Value Date/Time     2022 07:35 AM    K 3.7 2022 07:35 AM    CL 93 2022 07:35 AM    CO2 22 2022 07:35 AM    BUN 30 2022 07:35 AM    LABALBU 4.1 2022 08:55 PM    CREATININE 1.5 2022 07:35 AM    CALCIUM 9.0 2022 07:35 AM    GFRAA >60 2022 07:34 AM    GFRAA >60 2011 10:49 AM LABGLOM 49 11/11/2022 07:35 AM     PT/INR:    Lab Results   Component Value Date/Time    PROTIME 17.4 07/24/2022 11:23 AM    PROTIME 36.3 10/29/2015 10:41 AM    INR 1.43 07/24/2022 11:23 AM     PTT:    Lab Results   Component Value Date/Time    APTT 36.1 07/23/2022 07:24 AM   [APTT    Impression/Plan: 66 yo M admitted with CAP and new onset heart failure. We were consulted for retention.    -Straight cathed this AM for clear urine. Patient has not yet voided today  -Continue flomax  -Offered options of ISC vs indwelling catheter placement. He does not want indwelling schofield.  Continue to bladder scan every 6-8hours and ISC if PVR >500cc  -Encouraged ambulation, increased fluid intake if able  -Call with any questions    DAYAMI Webb

## 2022-11-11 NOTE — PROGRESS NOTES
Physical Therapy  Facility/Department: [facility] 4 PCU  Physical Therapy Daily Treatment    Name: Judy Haney  : 1950  MRN: [id number]  Date of Service: 2022    Discharge Recommendations: Judy Haney scored a 19/24 on the AM-PAC short mobility form. Current research shows that an AM-PAC score of 18 or greater is typically associated with a discharge to the patient's home setting. Based on the patient's AM-PAC score and their current functional mobility deficits, it is recommended that the patient have 2-3 sessions per week of Physical Therapy at d/c to increase the patient's independence. At this time, this patient demonstrates the endurance and safety to discharge home with (home vs OP services) and a follow up treatment frequency of 2-3x/wk. Please see assessment section for further patient specific details. If patient discharges prior to next session this note will serve as a discharge summary. Please see below for the latest assessment towards goals. PT Equipment Recommendations  Equipment Needed: No      Patient Diagnosis(es): The primary encounter diagnosis was Hyponatremia. Diagnoses of Nocturnal cough with wheeze, Bilateral lower extremity edema, and Abnormal chest x-ray were also pertinent to this visit. Past Medical History:  has a past medical history of AF (atrial fibrillation) ([facility] [address] Anticoagulated, Aortic valve disease, CKD (chronic kidney disease) stage 3, GFR 30-59 ml/min ([facility]), GERD (gastroesophageal reflux disease), History of DVT (deep vein thrombosis), HTN (hypertension), Lymphoma ([facility] [address] Nephrolithiasis, VERA (obstructive sleep apnea), and Pure hypercholesterolemia. Past Surgical History:  has a past surgical history that includes Cystoscopy (1-11-10); Colonoscopy (2006 ); Appendectomy; lymph node biopsy (11); lymph node dissection (N/A, ); Tonsillectomy;  Upper gastrointestinal endoscopy (N/A, 2022); and Upper gastrointestinal endoscopy (N/A, 9/21/2022). Assessment   Assessment: Pt continues to progress with therapy every session. Pt was challenged by a longer walk around the unit CGA due to LLE foot injury. He was challenged by strengthening and balance exercises in standing. PT continue to rec the pt return to home with inital 24 hr A. Will continue to follow duirng length of stay. Treatment Diagnosis: impaired gait mechanics 2/2 left foot injury  Therapy Prognosis: Good  Decision Making: Low Complexity  Requires PT Follow-Up: Yes  Activity Tolerance  Activity Tolerance: Patient tolerated treatment well     Plan   Physcial Therapy Plan  General Plan:  (2-5)  Current Treatment Recommendations: Strengthening, Patient/Caregiver education & training, Therapeutic activities, Balance training, Gait training, Stair training, Functional mobility training, Transfer training, Endurance training, Safety education & training  Safety Devices  Type of Devices: All fall risk precautions in place, Call light within reach, Nurse notified, Left in bed, Bed alarm in place (Sitting EOB, nursing stated it was okay)     Restrictions  Position Activity Restriction  Other position/activity restrictions: Up with Assistance, HOB elevated 30 degrees     Subjective   Subjective  Subjective: Pt found sitting EOB. Agreeable to therapy.          Social/Functional History  Social/Functional History  Lives With: Spouse  Type of Home: House  Home Layout: Two level  Home Access: Stairs to enter without rails  Entrance Stairs - Number of Steps: 1  Bathroom Shower/Tub: Tub/Shower unit  Bathroom Toilet: Handicap height  Bathroom Equipment: Hand-held shower (Planning on getting grab bars and shower chair put in)  Bathroom Accessibility: Walker accessible  Home Equipment: Jonathan Daigle  Has the patient had two or more falls in the past year or any fall with injury in the past year?: No  ADL Assistance: Independent (wife sometimes assists with socks and shoes)  Homemaking Assistance: Independent  Homemaking Responsibilities: Yes  Ambulation Assistance: Independent  Transfer Assistance: Independent  Active : Yes  Mode of Transportation: Car  Occupation: Retired  Type of Occupation: [employer]   Leisure & Hobbies: Tv, reading books  Vision/Hearing       Cognition         Objective   Heart Rate: 66  Heart Rate Source: Monitor  BP: (!) 144/75  BP Location: Left upper arm  BP Method: Automatic  Patient Position: Sitting  MAP (Calculated): 98  Resp: 18  SpO2: 96 %  O2 Device: None (Room air)                                   Ambulation  Surface: Level tile  Device: No Device  Assistance: Contact guard assistance  Quality of Gait: Slightly unsteady due to left foot injury, antalgic gait on the left, shuffle steps,  Gait Deviations: Decreased step length;Decreased step height;Slow Mary  Distance: 400ft+ 10ftx4        Exercise Treatment: Standing Marches, Hip Flexion, Squats to the chair, recliner hamstring curls 2x10        OutComes Score                                                  AM-PAC Score  AM-PAC Inpatient Mobility Raw Score : 19 (11/11/22 1306)  AM-PAC Inpatient T-Scale Score : 45.44 (11/11/22 1306)  Mobility Inpatient CMS 0-100% Score: 41.77 (11/11/22 1306)  Mobility Inpatient CMS G-Code Modifier : CK (11/11/22 1306)          Tinneti Score       Goals  Short Term Goals  Time Frame for Short Term Goals: Discharge- ongoing  Short Term Goal 1: Sit<>Stand Mod I  Short Term Goal 2: Ambulate 150ft with Mod I  Short Term Goal 3: Ascend/descend a flight of steps Mod I  Patient Goals   Patient Goals :  To Return Home       Education  Patient Education  Education Given To: Patient  Education Provided: Role of Therapy  Education Method: Demonstration  Barriers to Learning: None  Education Outcome: Verbalized understanding      Therapy Time   Individual Concurrent Group Co-treatment   Time In 1020         Time Out 1115         Minutes 55          Timed Code Treatment Minutes: 55    Total Treatment Minutes:  [address] [facility], [address] [facility]

## 2022-11-11 NOTE — PROGRESS NOTES
[facility]  Cardiology Inpatient Consult Service  Daily Progress Note        Admit Date:  11/9/2022    Referring Physician: Mehrna Recinos MD    Primary Cardiologist: Dr. Marquis Devries    Reason for Consultation/Chief Complaint: Acute On Chronic HFpEF    Subjective: Interval history:  Patient seen and examined at bedside. Feeling okay. Changing medications to r/o amlodipine as possible cause of BL LE edema. Varicose Veins most notably seen on LRE, may be some on LLE. Coreg started. Lasix reduced from TID to BID. Lopressor stopped. Amlodipine stopped. Medications:   potassium chloride  40 mEq Oral Once    magnesium oxide  400 mg Oral Once    carvedilol  6.25 mg Oral BID WC    furosemide  20 mg IntraVENous BID    tamsulosin  0.4 mg Oral Daily    apixaban  5 mg Oral BID    cefTRIAXone (ROCEPHIN) IV  2,000 mg IntraVENous Q24H    ipratropium-albuterol  1 ampule Inhalation BID    famotidine  20 mg Oral Nightly    sodium chloride flush  5-40 mL IntraVENous 2 times per day    doxycycline (VIBRAMYCIN) IV  100 mg IntraVENous Q12H    budesonide  250 mcg Nebulization BID       IV drips:   sodium chloride 25 mL (11/09/22 2320)       PRN:  guaiFENesin-dextromethorphan, labetalol, sodium chloride flush, sodium chloride, polyethylene glycol, acetaminophen **OR** acetaminophen, perflutren lipid microspheres, hydrALAZINE      Objective:     Vitals:    11/11/22 0557 11/11/22 0802 11/11/22 0829 11/11/22 1004   BP:   (!) 150/75 (!) 151/93   Pulse: (!) 44 61 66 73   Resp:   19 20   Temp:   98.2 °F (36.8 °C) 97.9 °F (36.6 °C)   TempSrc:   Oral Oral   SpO2:  98% 97% 93%   Weight:       Height:           Intake/Output Summary (Last 24 hours) at 11/11/2022 1104  Last data filed at 11/11/2022 0654  Gross per 24 hour   Intake 970 ml   Output 3775 ml   Net -2805 ml     I/O last 3 completed shifts: In: 1120 [P.O.:1010;  I.V.:10; IV Piggyback:100]  Out: 4215 [Urine:4215]  Wt Readings from Last 3 Encounters: 11/11/22 214 lb 4.6 oz (97.2 kg)   09/21/22 217 lb (98.4 kg)   07/27/22 215 lb 9.6 oz (97.8 kg)       Admit Wt: Weight: 225 lb 1.4 oz (102.1 kg)   Todays Wt: Weight: 214 lb 4.6 oz (97.2 kg)    TELEMETRY: Atrial fibrillation     Physical Exam:   General Appearance:  Well-appearing. HEENT: Normal HEENT exam.    Lungs:  Normal effort and normal respiratory rate. There are rhonchi. Heart: Normal rate. Irregular rhythm. Abdomen: Abdomen is soft. Extremities: (Edema BL LE), Varicose Veins  Neurological: Patient is oriented to person, place and time. Skin:  Warm. Objective:  Vital signs: (most recent): Blood pressure (!) 151/93, pulse 73, temperature 97.9 °F (36.6 °C), temperature source Oral, resp. rate 20, height 6' 1\" (1.854 m), weight 214 lb 4.6 oz (97.2 kg), SpO2 93 %. Labs:   Recent Labs     11/09/22  2055 11/10/22  0452 11/11/22  0735   *  126* 126* 130*   K 4.1 3.9 3.7   BUN 24* 23* 30*   CREATININE 1.2 1.1 1.5*   CL 89* 87* 93*   CO2 23 20* 22   GLUCOSE 124* 114* 117*   CALCIUM 8.9 9.2 9.0   MG  --  1.80  --      Recent Labs     11/10/22  0452 11/10/22  1923 11/11/22  0735   WBC 11.6* 11.5* 10.8   HGB 12.2* 12.1* 11.7*   HCT 37.8* 38.2* 37.2*    245 241   MCV 73.4* 73.9* 74.6*     No results for input(s): CHOLTOT, TRIG, HDL, CHOLHDL, LDL in the last 72 hours. Invalid input(s): Makenna Parker  No results for input(s): PTT, INR in the last 72 hours. Invalid input(s): PT  Recent Labs     11/09/22  0945   TROPONINI <0.01     No results for input(s): BNP in the last 72 hours. No results for input(s): NTPROBNP in the last 72 hours. Recent Labs     11/09/22 2055   TSH 2.04       Imaging:   I personally reviewed imaging studies including CXR, Stress test, TTE/EDIE.     Telemetry: Afib    Assessment & Plan:     CAP  Productive Cough, COVID and Flu negative  CXR concerning for infectious process  Antibiotics per primary team     Acute on Chronic HFpEF  While patient symptoms may have other explanations, will obtain ECHO to r/o HF  SOB reported on admission may be more cough, patient does not endorse SOB to me  BL LE edema may be due to Amlodipine  Pro-BNP is somewhat elevated at 4,944  -Stopped Amlodipine  -stopped lopressor  -Getting Lasix, reduced from TID to BID  -new ECHO: EF 50-55%     Atrial Fibrillation  In atrial fibrillation right now, asymptomatic   -continue Apixaban  -switched Lopressor to Coreg       Thank you for allowing to us to participate in the care of Kymberly Kan. Please call our service with questions. All questions and concerns were addressed to the patient/family. Alternatives to my treatment were discussed. The note was completed using EMR. Every effort was made to ensure accuracy; however, inadvertent computerized transcription errors may be present. I have personally reviewed the reports and images of labs, radiological studies, cardiac studies including ECG's and telemetry, current and old medical records. Ethan Bruce MD  Internal Medicine, PGY-1      11/11/2022 11:04 AM    Original note by resident/student was edited based on my personal history and exam of the patient. I have personally reviewed the reports and images of labs, radiological studies, cardiac studies including ECG's and telemetry, current and old medical records. The note was completed using EMR. Every effort was made to ensure accuracy; however, inadvertent computerized transcription errors may be present. Patient is a 66 yo man, patient of Dr Keny Li, with h/o CAF on eliquis, HFpEF, HLP, CKD3, remote B cell lymphoma, varicose veins. Patient presented to ED on 11/9 with dyspnea x 3 days. He was admitted for acute heart failure, possible CAP, severe HTN. Echo 07/2020: [address], EF 55%, indeterminate diastolic, normal RV, mild valve disease, AscAo 4.1 cm (echo similar to echoes from 2859-0227).       ECG c/w AF 79 bpm, otherwise normal (similar to prior ECGs)    Echo 11/10/2022: Mild LVD, LVEF 55%, indeterminate diastolic function, normal RV, mild MR, RVSP 50 (8). Interval history:  Patient remains off supplemental oxygen. I's and O's -900, -1.9 L, weight down 4 pounds. Creatinine up at 1.5 from 1.1.  BP remains elevated. Assessment and Plan:   1. Acute on chronic HFpEF  2. CAP. 3. CAF on AC. 4. HLP  5. Lower extremity edema. It is most likely multifactorial (acute heart failure with volume overload, side effect of amlodipine, varicose veins)           -We will lower IV Lasix 20 mg every 8 hours to 20 mg twice daily  -We will stop amlodipine  - We will stop Toprol 25 mg daily and start carvedilol 6.25 mg p.o. twice daily for better BP control.  - Strict I's and O's every shift and standing weights if possible, low-salt diet, daily BMP with reflex to Mg (correct lytes for goals K >4.0 and Mg > 2.0) and wean supplemental oxygen to off (or down to baseline supplemental oxygen requirements) for sats greater than 92-94%. -Changed sotalol 40 bid to BB (due to chronic AF). -Cw IV antibiotics per ID team.  - Continue with Eliquis  -Would recommend compression stockings. I have personally reviewed the reports and images of labs, radiological studies, cardiac studies including ECG's and telemetry, current and old medical records. The note was completed using EMR and Dragon dictation system. Every effort was made to ensure accuracy; however, inadvertent computerized transcription errors may be present. All questions and concerns were addressed to the patient/family. Alternatives to my treatment were discussed. I would like to thank you for providing me the opportunity to participate in the care of your patient. If you have any questions, please do not hesitate to contact me.       Maxine Ferrer MD, [facility] [place], [address]  The [address]  [address] [address]  Ph: [phone]  Fax: [phone]

## 2022-11-11 NOTE — PROGRESS NOTES
Progress Note    Admit Date: 11/9/2022  Day: 3  Diet: ADULT DIET; Regular; Low Sodium (2 gm); 2000 ml    CC: cough    Interval history:   Patient was seen and examined at bedside. He reports feeling better today than yesterday with significant improvement in the BLLE edema. He denied any complaints at this time. He has no oxygen requirement and saturating well on room air. Medications:     Scheduled Meds:   carvedilol  6.25 mg Oral BID WC    furosemide  20 mg IntraVENous BID    tamsulosin  0.4 mg Oral Daily    apixaban  5 mg Oral BID    cefTRIAXone (ROCEPHIN) IV  2,000 mg IntraVENous Q24H    ipratropium-albuterol  1 ampule Inhalation BID    famotidine  20 mg Oral Nightly    sodium chloride flush  5-40 mL IntraVENous 2 times per day    doxycycline (VIBRAMYCIN) IV  100 mg IntraVENous Q12H    budesonide  250 mcg Nebulization BID     Continuous Infusions:   sodium chloride 25 mL (11/09/22 2320)     PRN Meds:guaiFENesin-dextromethorphan, labetalol, sodium chloride flush, sodium chloride, polyethylene glycol, acetaminophen **OR** acetaminophen, perflutren lipid microspheres, hydrALAZINE    Objective:   Vitals:   T-max:  Patient Vitals for the past 8 hrs:   BP Temp Temp src Pulse Resp SpO2 Weight   11/11/22 1147 (!) 144/75 98.8 °F (37.1 °C) Axillary 66 18 96 % --   11/11/22 1004 (!) 151/93 97.9 °F (36.6 °C) Oral 73 20 93 % --   11/11/22 0829 (!) 150/75 98.2 °F (36.8 °C) Oral 66 19 97 % --   11/11/22 0802 -- -- -- 61 -- 98 % --   11/11/22 0557 -- -- -- (!) 44 -- -- --   11/11/22 0426 (!) 152/82 98 °F (36.7 °C) Oral 67 20 96 % 214 lb 4.6 oz (97.2 kg)       Intake/Output Summary (Last 24 hours) at 11/11/2022 1152  Last data filed at 11/11/2022 0654  Gross per 24 hour   Intake 970 ml   Output 3775 ml   Net -2805 ml       Review of Systems   All other systems reviewed and are negative. Physical Exam  Vitals reviewed. Constitutional:       General: He is awake. He is not in acute distress.      Appearance: He is overweight. He is not ill-appearing or toxic-appearing. Pulmonary:      Effort: Pulmonary effort is normal.      Breath sounds: Examination of the right-upper field reveals rhonchi. Examination of the right-lower field reveals rhonchi. Examination of the left-lower field reveals rhonchi. Rhonchi present. Chest:      Chest wall: No tenderness. Abdominal:      General: There is distension. Palpations: There is fluid wave. There is no shifting dullness. Tenderness: There is no abdominal tenderness. Musculoskeletal:      Right lower leg: No edema. Left lower leg: No edema. Neurological:      General: No focal deficit present. Mental Status: He is alert and oriented to person, place, and time. Sensory: Sensation is intact. Motor: Motor function is intact. Psychiatric:         Attention and Perception: Attention and perception normal.         Mood and Affect: Mood normal.         Speech: Speech normal.       LABS:    CBC:   Recent Labs     11/10/22  0452 11/10/22  1923 11/11/22  0735   WBC 11.6* 11.5* 10.8   HGB 12.2* 12.1* 11.7*   HCT 37.8* 38.2* 37.2*    245 241   MCV 73.4* 73.9* 74.6*     Renal:    Recent Labs     11/09/22  2055 11/10/22  0452 11/11/22  0735   *  126* 126* 130*   K 4.1 3.9 3.7   CL 89* 87* 93*   CO2 23 20* 22   BUN 24* 23* 30*   CREATININE 1.2 1.1 1.5*   GLUCOSE 124* 114* 117*   CALCIUM 8.9 9.2 9.0   MG  --  1.80  --    ANIONGAP 14 19* 15     Hepatic:   Recent Labs     11/09/22 2055   AST 25   ALT 15   BILITOT 1.1*   BILIDIR 0.5*   PROT 7.0   LABALBU 4.1   ALKPHOS 82     Troponin:   Recent Labs     11/09/22  0945   TROPONINI <0.01     BNP: No results for input(s): BNP in the last 72 hours. Lipids: No results for input(s): CHOL, HDL in the last 72 hours. Invalid input(s): LDLCALCU, TRIGLYCERIDE  ABGs:  No results for input(s): PHART, UMN7WFU, PO2ART, XPM5IAP, BEART, THGBART, J2TBLUGL, OEU0YTE in the last 72 hours.     INR: No results for input(s): INR in the last 72 hours. Lactate: No results for input(s): LACTATE in the last 72 hours. Cultures:  -----------------------------------------------------------------  RAD:   XR CHEST PORTABLE   Final Result      Streaky bibasilar airspace opacities, possibly infectious. Mild cardiomegaly. US RENAL COMPLETE    (Results Pending)       Assessment/Plan:   Brad Herrera is a 65-year-old male past medical history of hypertension hyperlipidemia A. fib on Eliquis presented to the ED with cough and shortness of breath. New onset HFpEF  Bilateral edema significantly improved but mild rackles RUL. Echo 11/10/22: EF 50-55%. with indeterminate diastolic function. ProBNP is 4944 on admission, will recheck tomorrow  He was on Lasix 20 mg IV TID now BID  Monitor daily standing weight (225lb on admission, today at 214lb)  Follow strict I/os (UOP 4L, I/o 3L)  Monitor daily electrolytes and BMPs and replace prn  Continue respiratory therapy as needed for SOB  Cardiology has been consulted and agreed to the above plan. PT OT following    Community acquired pneumonia  Patient has cough that was productive of reddish sputum. COVID and Influenza were negative in the ED  CXR was concerning for infectious etiology  White count normal this morning, patient is afebrile  Follow-up blood cultures no growth to date  Follow-up MRSA nasal probe still pending  Follow-up Legionella antigen is negative  Follow-up strep antigen is negative  Continue with doxycycline 100 mg Q12 for 4 more days  Continue ceftriaxone for 2 more days    Acute urinary retention  Patient bladder scanned every 6-8 hours. Offered option of ISC or ICP. Patient yet to decide which option he prefers. Patient is encouraged to ambulate more.   Continue on Flomax    IBRAHIMA  Most likely due to Lasix therapy  Monitor RFP daily     Moderate Hyponatremia most likely due to volume overload  Serum sodium now improving at 130  Follow up serum osmolarity is 272  Follow up urine osmolarity is 614  Follow up urine sodium <20  Monitor daily BMPs  Continue on lasix    Chronic Atrial fibrillation (controlled)  Continue on telemetry  Continue on Eliquis 2.5 mg BID  Continue on Lopressor 25 mg BID    Essential hypertension  Continue home amlodipine     GERD  Continue famotidine 20 mg nightly     Microcytic Anemia  Hemoglobin 11.7   Follow iron studies showed low iron of 49, and saturation of 15 but normal TIBC of 330. Follow up daily CBCs     Diffuse large cell B cell non-Hodgkin's lymphoma, status post six cycles of Rituxan/CHOP, completed 09/2011    Barriers to Discharge:  Patient improving on lasix. But he has urinary retention now. Patient is yet decide which of the offered options ISC and ICP he will prefer. Code Status: Full code  FEN: Low sodium diet  PPX: Lovenox  DISPO: Fuad Browning MD, PGY-1  11/11/22  11:52 AM    This patient has been staffed and discussed with Darrick Kim MD.     Gaurav Cisneros MD     Patient seen and examined, plan of care discussed with residents. Agree with their assessment and plan with following addendum:  Leg edema improved with diuresis. Labs reviewed- will get renal US to rule out hydro given urinary retention.        Darrick Kim MD

## 2022-11-11 NOTE — RT PROTOCOL NOTE
RT Nebulizer Bronchodilator Protocol Note    There is a bronchodilator order in the chart from a provider indicating to follow the RT Bronchodilator Protocol and there is an Initiate RT Bronchodilator Protocol order as well (see protocol at bottom of note). CXR Findings:  XR CHEST PORTABLE    Result Date: 11/9/2022  Streaky bibasilar airspace opacities, possibly infectious. Mild cardiomegaly. The findings from the last RT Protocol Assessment were as follows:  Smoking: None or smoker <15 pack years  Respiratory Pattern: Regular pattern and RR 12-20 bpm  Breath Sounds: Slightly diminished and/or crackles  Cough: Strong, spontaneous, non-productive  Indication for Bronchodilator Therapy: Decreased or absent breath sounds  Bronchodilator Assessment Score: 2    Aerosolized bronchodilator medication orders have been revised according to the RT Nebulizer Bronchodilator Protocol below. Respiratory Therapist to perform RT Therapy Protocol Assessment initially then follow the protocol. Repeat RT Therapy Protocol Assessment PRN for score 0-3 or on second treatment, BID, and PRN for scores above 3. No Indications - adjust the frequency to every 6 hours PRN wheezing or bronchospasm, if no treatments needed after 48 hours then discontinue using Per Protocol order mode. If indication present, adjust the RT bronchodilator orders based on the Bronchodilator Assessment Score as indicated below. If a patient is on this medication at home then do not decrease Frequency below that used at home. 0-3 - enter or revise RT bronchodilator order(s) to equivalent RT Bronchodilator order with Frequency of every 4 hours PRN for wheezing or increased work of breathing using Per Protocol order mode.        4-6 - enter or revise RT Bronchodilator order(s) to two equivalent RT bronchodilator orders with one order with BID Frequency and one order with Frequency of every 4 hours PRN wheezing or increased work of breathing using Per Protocol order mode. 7-10 - enter or revise RT Bronchodilator order(s) to two equivalent RT bronchodilator orders with one order with TID Frequency and one order with Frequency of every 4 hours PRN wheezing or increased work of breathing using Per Protocol order mode. 11-13 - enter or revise RT Bronchodilator order(s) to one equivalent RT bronchodilator order with QID Frequency and an Albuterol order with Frequency of every 4 hours PRN wheezing or increased work of breathing using Per Protocol order mode. Greater than 13 - enter or revise RT Bronchodilator order(s) to one equivalent RT bronchodilator order with every 4 hours Frequency and an Albuterol order with Frequency of every 2 hours PRN wheezing or increased work of breathing using Per Protocol order mode. RT to enter RT Home Evaluation for COPD & MDI Assessment order using Per Protocol order mode.     Electronically signed by Erica Poole RCP on 11/11/2022 at 8:05 AM

## 2022-11-11 NOTE — PROGRESS NOTES
Bladder scan performed s/p urine incontinent episode, scan indicated >999 ml; straight cath performed w/return of 1050 ml clear, pale yellow. Slight resistance noted; pt states has hx of enlarged prostate but has not had it checked for 10 years.   Tolerated well

## 2022-11-11 NOTE — PROGRESS NOTES
Occupational Therapy  Facility/Department: [place] [address] PCU  Daily Treatment    Name: Risa Álvarez  : 1950  MRN: [id number]  Date of Service: 2022    Discharge Recommendations:  Risa Álvarez scored a 20/24 on the AM-PAC ADL Inpatient form. Current research shows that an AM-PAC score of 18 or greater is typically associated with a discharge to the patient's home setting. Based on the patient's AM-PAC score, and their current ADL deficits, it is recommended that the patient have 2-3 sessions per week of Occupational Therapy at d/c to increase the patient's independence. At this time, this patient demonstrates the endurance and safety to discharge home with home services and a follow up treatment frequency of 2-3x/wk. Please see assessment section for further patient specific details. If patient discharges prior to next session this note will serve as a discharge summary. Please see below for the latest assessment towards goals. Assessment   Assessment: Pt presents with a decline in functional independence. Pt is from home with wife and was independent with mobility and req some assist with dressing. Pt refusing OOB despite education and encouragement 2/2 fear of discomfort from newly placed schofield cath. Discussed d/c needs and recs. Pt stated understanding. Anticipate good preogress for home discharge with assist from wife. OT Equipment Recommendations  Equipment Needed: Yes  Mobility Devices: ADL Assistive Devices  ADL Assistive Devices: Shower Chair with back;Grab Bars - shower      Patient Diagnosis(es): The primary encounter diagnosis was Hyponatremia. Diagnoses of Nocturnal cough with wheeze, Bilateral lower extremity edema, and Abnormal chest x-ray were also pertinent to this visit.   Past Medical History:  has a past medical history of AF (atrial fibrillation) ([address] Anticoagulated, Aortic valve disease, CKD (chronic kidney disease) stage 3, GFR 30-59 ml/min ([address] GERD (gastroesophageal reflux disease), History of DVT (deep vein thrombosis), HTN (hypertension), Lymphoma ([facility] [address] Nephrolithiasis, VERA (obstructive sleep apnea), and Pure hypercholesterolemia. Past Surgical History:  has a past surgical history that includes Cystoscopy (1-11-10); Colonoscopy (8/18/2006 ); Appendectomy; lymph node biopsy (5/11/11); lymph node dissection (N/A, 2011); Tonsillectomy; Upper gastrointestinal endoscopy (N/A, 7/23/2022); and Upper gastrointestinal endoscopy (N/A, 9/21/2022). Plan   Occupational Therapy Plan  Times Per Week: 2-5     Restrictions  Position Activity Restriction  Other position/activity restrictions: Up with Assistance, HOB elevated 30 degrees    Subjective   General  Chart Reviewed: Yes  Additional Pertinent Hx: Pt admitted 11/10/22 with cough. X-ray chest= Streaky bibasilar airspace opacities, possibly infectious. Mild cardiomegaly. Family / Caregiver Present: No  Referring Practitioner: Dr Rachelle Dong  Diagnosis: Hyponatremia  Subjective  Subjective: Pt in supine, declining activity OOB despite education and encouragement 2/2 fear of discomfort from newly placed schofield cath.        Social/Functional History  Social/Functional History  Lives With: Spouse  Type of Home: House  Home Layout: Two level  Home Access: Stairs to enter without rails  Entrance Stairs - Number of Steps: 1  Bathroom Shower/Tub: Tub/Shower unit  Bathroom Toilet: Handicap height  Bathroom Equipment: Hand-held shower (Planning on getting grab bars and shower chair put in)  Bathroom Accessibility: Walker accessible  Home Equipment: Kendra Tinajero  Has the patient had two or more falls in the past year or any fall with injury in the past year?: No  ADL Assistance: Independent (wife sometimes assists with socks and shoes)  Homemaking Assistance: Independent  Homemaking Responsibilities: Yes  Ambulation Assistance: Independent  Transfer Assistance: Independent  Active : Yes  Mode of Transportation: Car  Occupation: Retired  Type of Occupation: Fon  Leisure & Hobbies: Tv, reading books       Objective     Functional mobility, ADL, functional t/fs: Pt refusing despite education and encouragement 2/2 fear of discomfort from newly placed schofield cath. Safety Devices  Type of Devices: All fall risk precautions in place;Call light within reach;Nurse notified; Left in bed;Bed alarm in place                    Activity Tolerance  Activity Tolerance: Patient tolerated treatment well                                                    Education: Role of OT, awareness of deficits, discharge and DME recs, ADL as therapeutic exercise, importance of OOB, appropriate level of exercise to resume in hospital and at d/c and how to appropriately modify/grade to increase strength       AM-PAC Score        AM-[facility] Inpatient Daily Activity Raw Score: 20 (11/11/22 1449)  AM-PAC Inpatient ADL T-Scale Score : 42.03 (11/11/22 1449)  ADL Inpatient CMS 0-100% Score: 38.32 (11/11/22 1449)  ADL Inpatient CMS G-Code Modifier : CJ (11/11/22 1449)    Goals  Short Term Goals  Time Frame for Short Term Goals: Discharge  Short Term Goal 1: Transfer to/from toilet with supervision  Short Term Goal 2: Stance with supervision x6 min while engaging in AD:/functional mobility  Short Term Goal 3: Lower body dressing with SBA  Patient Goals   Patient goals : Go home       Therapy Time   Individual Concurrent Group Co-treatment   Time In 1438         Time Out 1448         Minutes 10         Timed Code Treatment Minutes: 10 Minutes       If patient is discharged prior to next treatment session, this note will serve as the discharge summary.   Natalie Oro, OTR/L #[id number]

## 2022-11-11 NOTE — CARE COORDINATION
Case Management Assessment           Daily Note                 Date/ Time of Note: 11/11/2022 3:33 PM         Note completed by: Monica rGeer RN    Patient Name: Aura Zaidi  YOB: 1950    Diagnosis:Hyponatremia [E87.1]  Abnormal chest x-ray [R93.89]  Bilateral lower extremity edema [R60.0]  Nocturnal cough with wheeze [R05.8, R06.2]  Patient Admission Status: Inpatient    Date of Admission:11/9/2022  9:16 AM Length of Stay: 2 GLOS: GMLOS: 3.9    Current Plan of Care: IV lasix, monitoring electrolytes  ________________________________________________________________________________________  PT AM-PAC: 19 / 24 per last evaluation on: 11/11/22     OT AM-PAC: 20 / 24 per last evaluation on: 11/11/22     DME Needs for discharge:   ________________________________________________________________________________________  Discharge Plan: Home    Tentative discharge date: 11/12/22 vs 11.13.22    Current barriers to discharge: medical stability, diuresis    Referrals completed: Not Applicable    Resources/ information provided: Not indicated at this time  ________________________________________________________________________________________  Case Management Notes: CM continues to follow for DC planning and needs. Patient continues to be followed by cardiology, continues on Lasix for diuresis. Patient plans for return home at [place], no current needs from  at this time. Case management has presented and reviewed [facility] letter #2 to the patient and/or family/ POA. Patient and/or family/POA verbalized understanding of their medicare rights and appeal process if needed. Patient and/or family/POA has signed, initialed and placed today's date (11.11.2022) and time ([phone]) on [facility] letter #2 on the the appropriate lines. Patient and/or family/POA, copy of letter offered and they are aware that this original copy of [facility] letter #2 is available prior to discharge from the paper chart on the unit. Electronic documentation has been entered into epic for IMM letter #2 and original paper copy has been added to the paper chart at the nurses station. Dima Talavera and his family were provided with choice of provider; he and his family are in agreement with the discharge plan.     Care Transition Patient: Yes    Mellissa Sheikh RN  [facility]  Case Management Department  Ph: [phone]  Fax: [phone]

## 2022-11-12 VITALS
HEIGHT: 73 IN | HEART RATE: 70 BPM | DIASTOLIC BLOOD PRESSURE: 82 MMHG | WEIGHT: 201.94 LBS | RESPIRATION RATE: 16 BRPM | BODY MASS INDEX: 26.76 KG/M2 | SYSTOLIC BLOOD PRESSURE: 146 MMHG | TEMPERATURE: 98.1 F | OXYGEN SATURATION: 94 %

## 2022-11-12 LAB
ANION GAP SERPL CALCULATED.3IONS-SCNC: 14 MMOL/L (ref 3–16)
BASOPHILS ABSOLUTE: 0 K/UL (ref 0–0.2)
BASOPHILS RELATIVE PERCENT: 0.4 %
BUN BLDV-MCNC: 28 MG/DL (ref 7–20)
CALCIUM SERPL-MCNC: 9.3 MG/DL (ref 8.3–10.6)
CHLORIDE BLD-SCNC: 95 MMOL/L (ref 99–110)
CO2: 25 MMOL/L (ref 21–32)
CREAT SERPL-MCNC: 1.3 MG/DL (ref 0.8–1.3)
EOSINOPHILS ABSOLUTE: 0.1 K/UL (ref 0–0.6)
EOSINOPHILS RELATIVE PERCENT: 1 %
GFR SERPL CREATININE-BSD FRML MDRD: 58 ML/MIN/{1.73_M2}
GLUCOSE BLD-MCNC: 111 MG/DL (ref 70–99)
HCT VFR BLD CALC: 37.3 % (ref 40.5–52.5)
HEMOGLOBIN: 12.2 G/DL (ref 13.5–17.5)
LYMPHOCYTES ABSOLUTE: 0.7 K/UL (ref 1–5.1)
LYMPHOCYTES RELATIVE PERCENT: 7.2 %
MCH RBC QN AUTO: 24.2 PG (ref 26–34)
MCHC RBC AUTO-ENTMCNC: 32.6 G/DL (ref 31–36)
MCV RBC AUTO: 74.3 FL (ref 80–100)
MONOCYTES ABSOLUTE: 1.4 K/UL (ref 0–1.3)
MONOCYTES RELATIVE PERCENT: 13.8 %
NEUTROPHILS ABSOLUTE: 7.8 K/UL (ref 1.7–7.7)
NEUTROPHILS RELATIVE PERCENT: 77.6 %
PDW BLD-RTO: 21.3 % (ref 12.4–15.4)
PLATELET # BLD: 246 K/UL (ref 135–450)
PMV BLD AUTO: 7.8 FL (ref 5–10.5)
POTASSIUM REFLEX MAGNESIUM: 3.6 MMOL/L (ref 3.5–5.1)
PRO-BNP: 2852 PG/ML (ref 0–124)
RBC # BLD: 5.02 M/UL (ref 4.2–5.9)
SODIUM BLD-SCNC: 134 MMOL/L (ref 136–145)
WBC # BLD: 10.1 K/UL (ref 4–11)

## 2022-11-12 PROCEDURE — 83880 ASSAY OF NATRIURETIC PEPTIDE: CPT

## 2022-11-12 PROCEDURE — 6370000000 HC RX 637 (ALT 250 FOR IP)

## 2022-11-12 PROCEDURE — 85025 COMPLETE CBC W/AUTO DIFF WBC: CPT

## 2022-11-12 PROCEDURE — 6370000000 HC RX 637 (ALT 250 FOR IP): Performed by: INTERNAL MEDICINE

## 2022-11-12 PROCEDURE — 6360000002 HC RX W HCPCS: Performed by: INTERNAL MEDICINE

## 2022-11-12 PROCEDURE — 94761 N-INVAS EAR/PLS OXIMETRY MLT: CPT

## 2022-11-12 PROCEDURE — 94664 DEMO&/EVAL PT USE INHALER: CPT

## 2022-11-12 PROCEDURE — 36415 COLL VENOUS BLD VENIPUNCTURE: CPT

## 2022-11-12 PROCEDURE — 80048 BASIC METABOLIC PNL TOTAL CA: CPT

## 2022-11-12 PROCEDURE — 6360000002 HC RX W HCPCS

## 2022-11-12 PROCEDURE — 2580000003 HC RX 258

## 2022-11-12 PROCEDURE — 94640 AIRWAY INHALATION TREATMENT: CPT

## 2022-11-12 RX ORDER — POTASSIUM CHLORIDE 20 MEQ/1
20 TABLET, EXTENDED RELEASE ORAL DAILY
Qty: 90 TABLET | Refills: 1 | Status: SHIPPED | OUTPATIENT
Start: 2022-11-12

## 2022-11-12 RX ORDER — CARVEDILOL 6.25 MG/1
6.25 TABLET ORAL 2 TIMES DAILY WITH MEALS
Qty: 60 TABLET | Refills: 3 | Status: SHIPPED | OUTPATIENT
Start: 2022-11-12 | End: 2022-11-12 | Stop reason: SDUPTHER

## 2022-11-12 RX ORDER — LOSARTAN POTASSIUM 25 MG/1
25 TABLET ORAL DAILY
Status: DISCONTINUED | OUTPATIENT
Start: 2022-11-12 | End: 2022-11-12

## 2022-11-12 RX ORDER — TAMSULOSIN HYDROCHLORIDE 0.4 MG/1
0.4 CAPSULE ORAL DAILY
Qty: 30 CAPSULE | Refills: 3 | Status: SHIPPED | OUTPATIENT
Start: 2022-11-12

## 2022-11-12 RX ORDER — CARVEDILOL 6.25 MG/1
3.12 TABLET ORAL 2 TIMES DAILY WITH MEALS
Qty: 60 TABLET | Refills: 3 | Status: SHIPPED | OUTPATIENT
Start: 2022-11-12 | End: 2022-11-12 | Stop reason: SDUPTHER

## 2022-11-12 RX ORDER — FUROSEMIDE 40 MG/1
40 TABLET ORAL DAILY
Qty: 30 TABLET | Refills: 3 | Status: SHIPPED | OUTPATIENT
Start: 2022-11-12

## 2022-11-12 RX ORDER — GUAIFENESIN/DEXTROMETHORPHAN 100-10MG/5
5 SYRUP ORAL EVERY 4 HOURS PRN
Qty: 120 ML | Refills: 0 | Status: SHIPPED | OUTPATIENT
Start: 2022-11-12 | End: 2022-11-22

## 2022-11-12 RX ORDER — CARVEDILOL 6.25 MG/1
3.12 TABLET ORAL 2 TIMES DAILY WITH MEALS
Qty: 60 TABLET | Refills: 3 | Status: SHIPPED | OUTPATIENT
Start: 2022-11-12

## 2022-11-12 RX ORDER — FERROUS SULFATE 325(65) MG
325 TABLET ORAL
Qty: 90 TABLET | Refills: 1 | Status: SHIPPED | OUTPATIENT
Start: 2022-11-12

## 2022-11-12 RX ORDER — POTASSIUM CHLORIDE 20 MEQ/1
20 TABLET, EXTENDED RELEASE ORAL ONCE
Status: COMPLETED | OUTPATIENT
Start: 2022-11-12 | End: 2022-11-12

## 2022-11-12 RX ORDER — CARVEDILOL 3.12 MG/1
3.12 TABLET ORAL 2 TIMES DAILY WITH MEALS
Status: DISCONTINUED | OUTPATIENT
Start: 2022-11-12 | End: 2022-11-12 | Stop reason: HOSPADM

## 2022-11-12 RX ADMIN — LOSARTAN POTASSIUM 25 MG: 25 TABLET, FILM COATED ORAL at 07:50

## 2022-11-12 RX ADMIN — IPRATROPIUM BROMIDE AND ALBUTEROL SULFATE 1 AMPULE: 2.5; .5 SOLUTION RESPIRATORY (INHALATION) at 08:39

## 2022-11-12 RX ADMIN — BUDESONIDE 250 MCG: 0.25 SUSPENSION RESPIRATORY (INHALATION) at 08:39

## 2022-11-12 RX ADMIN — GUAIFENESIN SYRUP AND DEXTROMETHORPHAN 5 ML: 100; 10 SYRUP ORAL at 06:37

## 2022-11-12 RX ADMIN — FUROSEMIDE 20 MG: 10 INJECTION, SOLUTION INTRAMUSCULAR; INTRAVENOUS at 07:50

## 2022-11-12 RX ADMIN — POTASSIUM CHLORIDE 20 MEQ: 1500 TABLET, EXTENDED RELEASE ORAL at 10:41

## 2022-11-12 RX ADMIN — ACETAMINOPHEN 650 MG: 325 TABLET ORAL at 03:03

## 2022-11-12 RX ADMIN — TAMSULOSIN HYDROCHLORIDE 0.4 MG: 0.4 CAPSULE ORAL at 07:50

## 2022-11-12 RX ADMIN — APIXABAN 5 MG: 5 TABLET, FILM COATED ORAL at 07:50

## 2022-11-12 RX ADMIN — SODIUM CHLORIDE, PRESERVATIVE FREE 10 ML: 5 INJECTION INTRAVENOUS at 07:49

## 2022-11-12 RX ADMIN — CARVEDILOL 6.25 MG: 6.25 TABLET, FILM COATED ORAL at 07:50

## 2022-11-12 ASSESSMENT — PAIN SCALES - GENERAL
PAINLEVEL_OUTOF10: 2
PAINLEVEL_OUTOF10: 0

## 2022-11-12 ASSESSMENT — PAIN DESCRIPTION - PAIN TYPE: TYPE: ACUTE PAIN

## 2022-11-12 ASSESSMENT — PAIN DESCRIPTION - ONSET: ONSET: SUDDEN

## 2022-11-12 ASSESSMENT — PAIN DESCRIPTION - FREQUENCY: FREQUENCY: INTERMITTENT

## 2022-11-12 ASSESSMENT — PAIN DESCRIPTION - DESCRIPTORS: DESCRIPTORS: ACHING

## 2022-11-12 ASSESSMENT — PAIN - FUNCTIONAL ASSESSMENT: PAIN_FUNCTIONAL_ASSESSMENT: PREVENTS OR INTERFERES SOME ACTIVE ACTIVITIES AND ADLS

## 2022-11-12 ASSESSMENT — PAIN DESCRIPTION - LOCATION: LOCATION: OTHER (COMMENT)

## 2022-11-12 NOTE — PROGRESS NOTES
Interval History and plan:      Blood pressure is stable. Urine output is 5225 mL. He is in a negative balance. Plan: Meza catheter is placed . Appreciate urology consult. Creatinine is improving 1.5> 1.3  Sodium is improving with diuresis   Replace potassium    Daily renal panel. Continue Lasix for now as per cardiology  DW cardiology. I will closely monitor and adjust diuretics as needed. Assessment :        Acute kidney injury: Creatinine increased from 1.0>>1.5. Patient is getting diuretics for history of congestive heart failure. Weight has improved 225 >> 214 pounds. proBNP was 4944. LFT is normal.  Urine sodium is less than 20 with osmolality of 614. UA shows some proteinuria. Ultrasound the kidney showed moderate bilateral hydronephrosis. Bilateral kidney size were normal.  Has bilateral renal cysts. Hyponatremia: Is improving with diuresis. 125>> 130    Congestive heart failure: Monitored by cardiology. He is currently on Lasix. Echocardiogram showed EF of 50 to 55%. IVC is dilated. He has pulmonary hypertension. He has history of kidney stones. [address] [place] [address] Nephrology would like to thank Patrick Hopkins MD   for opportunity to serve this patient      Please call with questions at-   24 Hrs Answering service [phone] or  7 am- 5 pm via Perfect serve or cell phone  Orvel Apgar, MD          CC/reason for consult :     Acute kidney injury. HPI :     Luis Doe is a 67 y.o. male presented to   the hospital on 2022 with cough. he has past medical history of stage III chronic kidney disease, GERD, hypertension, B-cell lymphoma, history of kidney stone, history of obstructive sleep apnea and atrial fibrillation. he presented to the hospital with a history of cough. It was associated with sputum. In the ER he was hypertensive. proBNP was elevated. Chest x-ray showed bibasilar opacities.  He was initiated on antibiotics. He is currently getting Lasix 20 mg IV 3 times daily. He is initiated on diuresis and his weight is down to 214 pounds. Cardiology is following the case. Creatinine increased from 1.0-1.5.  he is also hyponatremic. I was called for further management of acute kidney injury. He is also getting antibiotics for community-acquired pneumonia. Urology was consulted for urinary retention. He was placed on Flomax. He diffuse indwelling Meza catheter. ROS:     Seen with- resident    positives in bold   Constitutional:  fever, chills, weakness, weight change, fatigue  Skin:  rash, pruritus, hair loss, bruising, dry skin, petechiae  Head, Face, Neck   headaches, swelling,  cervical adenopathy  Respiratory: shortness of breath, cough, or wheezing  Cardiovascular: chest pain, palpitations, dizzy, edema  Gastrointestinal: nausea, vomiting, diarrhea, constipation,belly pain    Yellow skin, blood in stool  Musculoskeletal:  back pain, muscle weakness, gait problems,       joint pain or swelling. Genitourinary:  dysuria, poor urine flow, flank pain, blood in urine  Neurologic:  vertigo, TIA'S, syncope, seizures, focal weakness  Psychosocial:  insomnia, anxiety, or depression.   Additional positive findings:                    All other remaining systems are negative or unable to obtain        PMH/PSH/SH/Family History:     Past Medical History:   Diagnosis Date    AF (atrial fibrillation) (HCC)     Anticoagulated     Aortic valve disease     leaking no problems    CKD (chronic kidney disease) stage 3, GFR 30-59 ml/min (HCC)     GERD (gastroesophageal reflux disease)     History of DVT (deep vein thrombosis)     HTN (hypertension)     Lymphoma ([facility] [address] 2015    B cell    Nephrolithiasis     VERA (obstructive sleep apnea)     Pure hypercholesterolemia        Past Surgical History:   Procedure Laterality Date    APPENDECTOMY      COLONOSCOPY  8/18/2006     Dr. Chris Amin - internal hemorrhoids , hyperplastic polyp     CYSTOSCOPY  1-11-10     Dr. Katty Arora ureteral stent     LYMPH NODE BIOPSY  5/11/11    RIGHT CERVICAL    LYMPH NODE DISSECTION N/A 2011    TONSILLECTOMY      UPPER GASTROINTESTINAL ENDOSCOPY N/A 7/23/2022    EGD DIAGNOSTIC ONLY performed by Robert Bernabe MD at [address] [facility] 9/21/2022    EGD BIOPSY performed by Nelson Meyer MD at [facility]        reports that he has never smoked. He has never used smokeless tobacco. He reports that he does not drink alcohol and does not use drugs. family history includes Heart Disease in his mother.          Medication:     Current Facility-Administered Medications: losartan (COZAAR) tablet 25 mg, 25 mg, Oral, Daily  carvedilol (COREG) tablet 6.25 mg, 6.25 mg, Oral, BID WC  furosemide (LASIX) injection 20 mg, 20 mg, IntraVENous, BID  guaiFENesin-dextromethorphan (ROBITUSSIN DM) 100-10 MG/5ML syrup 5 mL, 5 mL, Oral, Q4H PRN  tamsulosin (FLOMAX) capsule 0.4 mg, 0.4 mg, Oral, Daily  apixaban (ELIQUIS) tablet 5 mg, 5 mg, Oral, BID  cefTRIAXone (ROCEPHIN) 2,000 mg in dextrose 5 % 50 mL IVPB mini-bag, 2,000 mg, IntraVENous, Q24H  labetalol (NORMODYNE;TRANDATE) injection 10 mg, 10 mg, IntraVENous, Q6H PRN  ipratropium-albuterol (DUONEB) nebulizer solution 1 ampule, 1 ampule, Inhalation, BID  famotidine (PEPCID) tablet 20 mg, 20 mg, Oral, Nightly  sodium chloride flush 0.9 % injection 5-40 mL, 5-40 mL, IntraVENous, 2 times per day  sodium chloride flush 0.9 % injection 5-40 mL, 5-40 mL, IntraVENous, PRN  0.9 % sodium chloride infusion, , IntraVENous, PRN  polyethylene glycol (GLYCOLAX) packet 17 g, 17 g, Oral, Daily PRN  acetaminophen (TYLENOL) tablet 650 mg, 650 mg, Oral, Q6H PRN **OR** acetaminophen (TYLENOL) suppository 650 mg, 650 mg, Rectal, Q6H PRN  perflutren lipid microspheres (DEFINITY) injection 1.5 mL, 1.5 mL, IntraVENous, ONCE PRN  doxycycline (VIBRAMYCIN) 100 mg in dextrose 5 % 100 mL IVPB, 100 mg, IntraVENous, Q12H  budesonide (PULMICORT) nebulizer suspension 250 mcg, 250 mcg, Nebulization, BID  hydrALAZINE (APRESOLINE) injection 10 mg, 10 mg, IntraVENous, Q6H PRN       Vitals :     Vitals:    11/12/22 0729   BP: (!) 146/82   Pulse: 70   Resp: 16   Temp: 98.1 °F (36.7 °C)   SpO2: 98%       I & O :       Intake/Output Summary (Last 24 hours) at 11/12/2022 0750  Last data filed at 11/12/2022 6335  Gross per 24 hour   Intake 820 ml   Output 5225 ml   Net -4405 ml          Physical Examination :     General appearance: Anxious- no, distressed- no, in good spirits-    HEENT: Lips- normal, teeth- ok , oral mucosa- moist  Neck : Mass- no, appears symmetrical, JVD- not visible  Respiratory: Respiratory effort-  normal, wheeze- no, crackles -   Cardiovascular:  Ausculation- No M/R/G, Edema no  Abdomen: visible mass- no, distention- no, scar- no, tenderness- no                            hepatosplenomegaly-  no  Musculoskeletal:  clubbing no,cyanosis- no , digital ischemia- no                           muscle strength- grossly normal , tone - grossly normal  Skin: rashes- no , ulcers- no, induration- no, tightening - no  Psychiatric:  Judgement and insight- normal           AAO X 3  Additional finding:      LABS:     Recent Labs     11/11/22  0735 11/11/22  1908 11/12/22  0440   WBC 10.8 10.2 10.1   HGB 11.7* 12.7* 12.2*   HCT 37.2* 39.2* 37.3*    248 246       Recent Labs     11/10/22  0452 11/11/22  0735 11/12/22  0440   * 130* 134*   K 3.9 3.7 3.6   CL 87* 93* 95*   CO2 20* 22 25   BUN 23* 30* 28*   CREATININE 1.1 1.5* 1.3   GLUCOSE 114* 117* 111*   MG 1.80  --   --         Nephrology  [address] [facility], [address]  Office: [phone]  Fax: [phone]

## 2022-11-12 NOTE — PROGRESS NOTES
Pt called out asking for assistance, was wrapped up in his blankets; states \"must have been dreaming\"; noted small amt of blood on pull up brief, likely due to restlessness in bed; catheter care provided again & brief changed.   Pt c/o's mild bladder/prostate area discomfort, requested & given Tylenol

## 2022-11-12 NOTE — PROGRESS NOTES
Attempted to get standing scale weight this morning; pt refuses to get OOB, states \"just got to sleep\"; therefore, bed scale weight obtained

## 2022-11-12 NOTE — PLAN OF CARE
Problem: Discharge Planning  Goal: Discharge to home or other facility with appropriate resources  11/12/2022 0025 by Marty Rasmussen RN  Outcome: Progressing  Flowsheets (Taken 11/12/2022 0025)  Discharge to home or other facility with appropriate resources:   Identify barriers to discharge with patient and caregiver   Arrange for needed discharge resources and transportation as appropriate   Identify discharge learning needs (meds, wound care, etc)   Arrange for interpreters to assist at discharge as needed   Refer to discharge planning if patient needs post-hospital services based on physician order or complex needs related to functional status, cognitive ability or social support system  11/11/2022 2028 by Rocio Harrison RN  Outcome: Progressing     Problem: Respiratory - Adult  Goal: Achieves optimal ventilation and oxygenation  11/12/2022 0025 by Marty Rasmussen RN  Outcome: Progressing  Flowsheets (Taken 11/12/2022 0025)  Achieves optimal ventilation and oxygenation:   Assess for changes in respiratory status   Assess for changes in mentation and behavior   Position to facilitate oxygenation and minimize respiratory effort   Oxygen supplementation based on oxygen saturation or arterial blood gases   Encourage broncho-pulmonary hygiene including cough, deep breathe, incentive spirometry   Assess the need for suctioning and aspirate as needed   Assess and instruct to report shortness of breath or any respiratory difficulty   Respiratory therapy support as indicated  11/11/2022 2028 by Rocio Harrison RN  Outcome: Progressing     Problem: Cardiovascular - Adult  Goal: Maintains optimal cardiac output and hemodynamic stability  11/12/2022 0025 by Marty Rasmussen RN  Outcome: Progressing  11/11/2022 2028 by Rocio Harrison RN  Outcome: Progressing  Goal: Absence of cardiac dysrhythmias or at baseline  11/12/2022 0025 by Marty Rasmussen RN  Outcome: Progressing  Flowsheets (Taken 11/12/2022 0025)  Absence of cardiac dysrhythmias or at baseline:   Monitor cardiac rate and rhythm   Assess for signs of decreased cardiac output   Administer antiarrhythmia medication and electrolyte replacement as ordered  11/11/2022 2028 by Shell Khan RN  Outcome: Progressing     Problem: Metabolic/Fluid and Electrolytes - Adult  Goal: Electrolytes maintained within normal limits  11/12/2022 0025 by Mookie Welch RN  Outcome: Progressing  11/11/2022 2028 by Shell Khan RN  Outcome: Progressing  Goal: Hemodynamic stability and optimal renal function maintained  11/12/2022 0025 by Mookie Welch RN  Outcome: Progressing  Flowsheets (Taken 11/12/2022 0025)  Hemodynamic stability and optimal renal function maintained:   Monitor labs and assess for signs and symptoms of volume excess or deficit   Monitor intake, output and patient weight   Monitor urine specific gravity, serum osmolarity and serum sodium as indicated or ordered   Monitor response to interventions for patient's volume status, including labs, urine output, blood pressure (other measures as available)   Encourage oral intake as appropriate   Instruct patient on fluid and nutrition restrictions as appropriate  11/11/2022 2028 by Shell Khan RN  Outcome: Progressing     Problem: Skin/Tissue Integrity  Goal: Absence of new skin breakdown  Description: 1. Monitor for areas of redness and/or skin breakdown  2. Assess vascular access sites hourly  3. Every 4-6 hours minimum:  Change oxygen saturation probe site  4. Every 4-6 hours:  If on nasal continuous positive airway pressure, respiratory therapy assess nares and determine need for appliance change or resting period.   11/12/2022 0025 by Mookie Welch RN  Outcome: Progressing  11/11/2022 2028 by Shell Khan RN  Outcome: Progressing     Problem: Safety - Adult  Goal: Free from fall injury  11/12/2022 0025 by Mookie Welch RN  Outcome: Progressing  Flowsheets (Taken 11/12/2022 0025)  Free From Fall Injury:   Instruct family/caregiver on patient safety   Based on caregiver fall risk screen, instruct family/caregiver to ask for assistance with transferring infant if caregiver noted to have fall risk factors  11/11/2022 2028 by Rj Keith RN  Outcome: Progressing     Problem: ABCDS Injury Assessment  Goal: Absence of physical injury  11/12/2022 0025 by Siddharth Sykes RN  Outcome: Progressing  Flowsheets (Taken 11/12/2022 0025)  Absence of Physical Injury: Implement safety measures based on patient assessment  11/11/2022 2028 by Rj Keith RN  Outcome: Progressing     Problem: Pain  Goal: Verbalizes/displays adequate comfort level or baseline comfort level  11/12/2022 0025 by Siddharth Sykes RN  Outcome: Progressing  Flowsheets (Taken 11/12/2022 0025)  Verbalizes/displays adequate comfort level or baseline comfort level:   Encourage patient to monitor pain and request assistance   Assess pain using appropriate pain scale   Administer analgesics based on type and severity of pain and evaluate response   Implement non-pharmacological measures as appropriate and evaluate response   Consider cultural and social influences on pain and pain management   Notify Licensed Independent Practitioner if interventions unsuccessful or patient reports new pain  11/11/2022 2028 by Rj Keith RN  Outcome: Progressing

## 2022-11-12 NOTE — PLAN OF CARE
Problem: Safety - Adult  Goal: Free from fall injury  Outcome: Progressing  Bed alarm active, educated pt on fall prevention and precautions, non skid footwear in use, gait belt applied when pt ambulating.

## 2022-11-12 NOTE — PROGRESS NOTES
Attempted to placed CLINT hose; pt allowed nurse to get one leg on, then immediately asked to have it removed; states \"too tight, I don't see how that could possibly help\". RN explained compression socks to patient, however, he continued to refuse to wear them.

## 2022-11-12 NOTE — CARE COORDINATION
Case Management Assessment            Discharge Note                    Date / Time of Note: 11/12/2022 9:38 AM                  Discharge Note Completed by: SEBASTIAN Abraham, MONIQUEW    Patient Name: Qamar Jennings   YOB: 1950  Diagnosis: Hyponatremia [E87.1]  Abnormal chest x-ray [R93.89]  Bilateral lower extremity edema [R60.0]  Nocturnal cough with wheeze [R05.8, R06.2]   Date / Time: 11/9/2022  9:16 AM    Current PCP: Glenn HO [address] patient: No    Hospitalization in the last 30 days: No    Advance Directives:  Code Status: Full Code  [place] DNR form completed and on chart: No    Financial:  Payor: Lavell Alert / Plan: MEDICARE PART A AND B / Product Type: *No Product type* /      Pharmacy:    Nuris Roberto [id number] [address] [place] [address]  Phone: [phone] Fax: [phone]      Assistance purchasing medications?: Potential Assistance Purchasing Medications: No  Assistance provided by Case Management: None at this time    Does patient want to participate in local refill/ meds to beds program?:      Meds To Beds General Rules:  1. Can ONLY be done Monday- Friday between 8:30am-5pm  2. Prescription(s) must be in pharmacy by 3pm to be filled same day  3. Copy of patient's insurance/ prescription drug card and patient face sheet must be sent along with the prescription(s)  4. Cost of Rx cannot be added to hospital bill. If financial assistance is needed, please contact unit  or ;  or  CANNOT provide pharmacy voucher for patients co-pays  5.  Patients can then  the prescription on their way out of the hospital at discharge, or pharmacy can deliver to the bedside if staff is available. (payment due at time of pick-up or delivery - cash, check, or card accepted)     Able to afford home medications/ co-pay costs: Yes    ADLS:  Current PT AM-PAC Score: 19 /24  Current OT AM-PAC Score: 20 /24      DISCHARGE Disposition: Home- No Services Needed    LOC at discharge: Not Applicable  DARIEL Completed: Yes    Notification completed in HENS/PAS?:  Not Applicable    IMM Completed:   Not Indicated    Transportation:  Transportation PLAN for discharge: family   Mode of Transport: Private Car  Reason for medical transport:   Name of [address] [place] [address],[address]: Not Applicable  Time of Transport:       Transport form completed: No    Home Care:  [address] ordered at discharge: No  [address]: Not Applicable  Orders faxed: No    Durable Medical Equipment:  DME Provider: nONE  Equipment obtained during hospitalization:     Home Oxygen and Respiratory Equipment:  Oxygen needed at discharge?: No  [address]: Not Applicable  Portable tank available for discharge?: No    Dialysis:  Dialysis patient: No    Dialysis Center:  Not Applicable    Additional CM Notes: Pt from home w/wife, Pt to return home at DC, no needs, wife will transport. The Plan for Transition of Care is related to the following treatment goals of Hyponatremia [E87.1]  Abnormal chest x-ray [R93.89]  Bilateral lower extremity edema [R60.0]  Nocturnal cough with wheeze [R05.8, R06.2]    The Patient and/or patient representative Elkin Craig and his family were provided with a choice of provider and agrees with the discharge plan Yes    Freedom of choice list was provided with basic dialogue that supports the patient's individualized plan of care/goals and shares the quality data associated with the providers.  Not Indicated    Care Transitions patient: No    SEBASTIAN Rosado, [facility]  Case Management Department  Ph: [phone]  Fax: [phone]

## 2022-11-12 NOTE — PROGRESS NOTES
Urology Attending Progress Note      Subjective: Indwelling catheter placed yesterday. No  complaints this morning    Vitals:  BP (!) 146/82   Pulse 70   Temp 98.1 °F (36.7 °C) (Axillary)   Resp 16   Ht 6' 1\" (1.854 m)   Wt 201 lb 15.1 oz (91.6 kg)   SpO2 94%   BMI 26.64 kg/m²   Temp  Av.1 °F (36.7 °C)  Min: 97.1 °F (36.2 °C)  Max: 98.9 °F (37.2 °C)    Intake/Output Summary (Last 24 hours) at 2022 0859  Last data filed at 2022 0729  Gross per 24 hour   Intake 820 ml   Output 5375 ml   Net -4555 ml       Exam: Urine clear    Labs:  WBC:    Lab Results   Component Value Date/Time    WBC 10.1 2022 04:40 AM     Hemoglobin/Hematocrit:    Lab Results   Component Value Date/Time    HGB 12.2 2022 04:40 AM    HCT 37.3 2022 04:40 AM     BMP:    Lab Results   Component Value Date/Time     2022 04:40 AM    K 3.6 2022 04:40 AM    CL 95 2022 04:40 AM    CO2 25 2022 04:40 AM    BUN 28 2022 04:40 AM    LABALBU 4.1 2022 08:55 PM    CREATININE 1.3 2022 04:40 AM    CALCIUM 9.3 2022 04:40 AM    GFRAA >60 2022 07:34 AM    GFRAA >60 2011 10:49 AM    LABGLOM 58 2022 04:40 AM     PT/INR:    Lab Results   Component Value Date/Time    PROTIME 17.4 2022 11:23 AM    PROTIME 36.3 10/29/2015 10:41 AM    INR 1.43 2022 11:23 AM     PTT:    Lab Results   Component Value Date/Time    APTT 36.1 2022 07:24 AM   [APTT    Imaging:   Impression   1. Moderate bilateral hydronephrosis. Correlate for bladder outlet obstruction. 2. Simple appearing bilateral renal cysts. Impression/Plan: 68 yo M admitted with CAP and new onset heart failure. We were consulted for retention.     -Indwelling catheter placed yesterday. Currently draining clear, no gross hematuria  -Continue flomax  -INNA showing moderate bilateral hydronephrosis. Should improve with schofield in place. Will get repeat INNA in 2-3 days.  If he is set for discharge, he can be sent home with catheter and we can arrange imaging/voiding trial as outpatient.         DAYAMI [place]

## 2022-11-12 NOTE — PROGRESS NOTES
Progress Note    Admit Date: 11/9/2022  Day: 4  Diet: ADULT DIET; Regular; Low Sodium (2 gm); 2000 ml    CC: cough    Interval history:   No acute overnight events. Patient seen and examined at bedside. Did not have any active complaints. Weight this  lbs which will be considered his baseline and dry weight. Medications:     Scheduled Meds:   carvedilol  3.125 mg Oral BID WC    furosemide  20 mg IntraVENous BID    tamsulosin  0.4 mg Oral Daily    apixaban  5 mg Oral BID    cefTRIAXone (ROCEPHIN) IV  2,000 mg IntraVENous Q24H    ipratropium-albuterol  1 ampule Inhalation BID    famotidine  20 mg Oral Nightly    sodium chloride flush  5-40 mL IntraVENous 2 times per day    doxycycline (VIBRAMYCIN) IV  100 mg IntraVENous Q12H    budesonide  250 mcg Nebulization BID     Continuous Infusions:   sodium chloride Stopped (11/12/22 1101)     PRN Meds:guaiFENesin-dextromethorphan, labetalol, sodium chloride flush, sodium chloride, polyethylene glycol, acetaminophen **OR** acetaminophen, perflutren lipid microspheres, hydrALAZINE    Objective:   Vitals:   T-max:  Patient Vitals for the past 8 hrs:   BP Temp Temp src Pulse Resp SpO2 Weight   11/12/22 0845 -- -- -- -- -- 94 % --   11/12/22 0812 -- -- -- -- -- -- 201 lb 15.1 oz (91.6 kg)   11/12/22 0729 (!) 146/82 98.1 °F (36.7 °C) Axillary 70 16 98 % --   11/12/22 0618 -- -- -- 55 -- -- --       Intake/Output Summary (Last 24 hours) at 11/12/2022 1337  Last data filed at 11/12/2022 0729  Gross per 24 hour   Intake 820 ml   Output 4175 ml   Net -3355 ml       Review of Systems   All other systems reviewed and are negative. Physical Exam  Vitals reviewed. Constitutional:       General: He is awake. He is not in acute distress. Appearance: He is overweight. He is not ill-appearing or toxic-appearing. Pulmonary:      Effort: Pulmonary effort is normal.      Breath sounds: Normal breath sounds. No rhonchi. Chest:      Chest wall: No tenderness. Abdominal:      General: There is no distension. Palpations: There is no shifting dullness or fluid wave. Tenderness: There is no abdominal tenderness. Musculoskeletal:      Right lower leg: No edema. Left lower leg: No edema. Neurological:      General: No focal deficit present. Mental Status: He is alert and oriented to person, place, and time. Sensory: Sensation is intact. Motor: Motor function is intact. Psychiatric:         Attention and Perception: Attention and perception normal.         Mood and Affect: Mood normal.         Speech: Speech normal.       LABS:    CBC:   Recent Labs     11/11/22  0735 11/11/22  1908 11/12/22  0440   WBC 10.8 10.2 10.1   HGB 11.7* 12.7* 12.2*   HCT 37.2* 39.2* 37.3*    248 246   MCV 74.6* 74.3* 74.3*     Renal:    Recent Labs     11/10/22  0452 11/11/22  0735 11/12/22  0440   * 130* 134*   K 3.9 3.7 3.6   CL 87* 93* 95*   CO2 20* 22 25   BUN 23* 30* 28*   CREATININE 1.1 1.5* 1.3   GLUCOSE 114* 117* 111*   CALCIUM 9.2 9.0 9.3   MG 1.80  --   --    ANIONGAP 19* 15 14     Hepatic:   Recent Labs     11/09/22 2055   AST 25   ALT 15   BILITOT 1.1*   BILIDIR 0.5*   PROT 7.0   LABALBU 4.1   ALKPHOS 82     Troponin:   No results for input(s): TROPONINI in the last 72 hours. BNP: No results for input(s): BNP in the last 72 hours. Lipids: No results for input(s): CHOL, HDL in the last 72 hours. Invalid input(s): LDLCALCU, TRIGLYCERIDE  ABGs:  No results for input(s): PHART, XPL5RIN, PO2ART, OEI2YDB, BEART, THGBART, Q2BHGMFA, ELN1BSK in the last 72 hours. INR: No results for input(s): INR in the last 72 hours. Lactate: No results for input(s): LACTATE in the last 72 hours. Cultures:  -----------------------------------------------------------------  RAD:   US RENAL COMPLETE   Final Result   1. Moderate bilateral hydronephrosis. Correlate for bladder outlet obstruction. 2. Simple appearing bilateral renal cysts.       XR CHEST PORTABLE   Final Result      Streaky bibasilar airspace opacities, possibly infectious. Mild cardiomegaly. US RENAL COMPLETE    (Results Pending)       Assessment/Plan:   Yon Pedraza is a 71-year-old male past medical history of hypertension hyperlipidemia A. fib on Eliquis presented to the ED with cough and shortness of breath. New onset HFpEF  Bilateral edema significantly improved but mild rackles RUL. Echo 11/10/22: EF 50-55%. with indeterminate diastolic function. ProBNP is 4944 on admission, 2853 on discharge. Treated with 20 mg IV lasix BID. Monitor daily electrolytes and BMPs and replace prn  PT OT following  Plan to discharge patient on 40 lasix PO daily. Suspicion for community acquired PNA:  Patient treated for suspicion of CAP due to left lobar consolidation as well as effusions on CT. Acute urinary retention associated with BL Hydronephrosis  Patient developed acute urinary retention and required straight cath followed by schofield placement. Urology was consulted, based on their recommendations, the patient was discharged with schofield at home and instructions to follow up with Urology outpatient. Continuing Flomax on discharge. Will f/u outpatient with Urology. Post Obstructive IBRAHIMA  Most likely post-obstructive   Plan to repeat a BMP and f/u nephrology outpatient. Plan to repeat renal USG within a week. Chronic Atrial fibrillation (controlled)  Continue on telemetry  Continue on Eliquis 5 mg BID  Patient switched from home Sotalol to Lopressor 25 mg BID that was eventually changed to Coreg 6.25 however patient was noted to have some pauses on tele thus he was discharged on Coreg 3.125 mg BID.    Will have the patient follow up outpatient with his cardiologist.     Essential hypertension  Continue home amlodipine     GERD  Continue famotidine 20 mg nightly     Microcytic Anemia  Hemoglobin 11.7   Follow iron studies showed low iron of 49, and saturation of 15 but normal TIBC of 330.      Diffuse large cell B cell non-Hodgkin's lymphoma, status post six cycles of Rituxan/CHOP, completed 09/2011    Code Status: Full code  FEN: Low sodium diet  PPX: Lovenox  DISPO: Discharge  Aftab Doe MD, PGY-2  11/12/22  1:37 PM    This patient has been staffed and discussed with Dr. Starla Rhodes MD

## 2022-11-12 NOTE — PLAN OF CARE
Problem: Discharge Planning  Goal: Discharge to home or other facility with appropriate resources  11/12/2022 1058 by Kathe Stauffer RN  Outcome: Completed  11/12/2022 0025 by Monica Herr RN  Outcome: Progressing  Flowsheets (Taken 11/12/2022 0025)  Discharge to home or other facility with appropriate resources:   Identify barriers to discharge with patient and caregiver   Arrange for needed discharge resources and transportation as appropriate   Identify discharge learning needs (meds, wound care, etc)   Arrange for interpreters to assist at discharge as needed   Refer to discharge planning if patient needs post-hospital services based on physician order or complex needs related to functional status, cognitive ability or social support system     Problem: Respiratory - Adult  Goal: Achieves optimal ventilation and oxygenation  11/12/2022 1058 by Kathe Stauffer RN  Outcome: Completed  11/12/2022 0025 by Monica Herr RN  Outcome: Progressing  Flowsheets (Taken 11/12/2022 0025)  Achieves optimal ventilation and oxygenation:   Assess for changes in respiratory status   Assess for changes in mentation and behavior   Position to facilitate oxygenation and minimize respiratory effort   Oxygen supplementation based on oxygen saturation or arterial blood gases   Encourage broncho-pulmonary hygiene including cough, deep breathe, incentive spirometry   Assess the need for suctioning and aspirate as needed   Assess and instruct to report shortness of breath or any respiratory difficulty   Respiratory therapy support as indicated     Problem: Cardiovascular - Adult  Goal: Maintains optimal cardiac output and hemodynamic stability  11/12/2022 1058 by Kathe Stauffer RN  Outcome: Completed  11/12/2022 0025 by Monica Herr RN  Outcome: Progressing  Goal: Absence of cardiac dysrhythmias or at baseline  11/12/2022 1058 by Kathe Stauffer RN  Outcome: Completed  11/12/2022 0025 by Monica Herr RN  Outcome: Progressing  Flowsheets (Taken 11/12/2022 0025)  Absence of cardiac dysrhythmias or at baseline:   Monitor cardiac rate and rhythm   Assess for signs of decreased cardiac output   Administer antiarrhythmia medication and electrolyte replacement as ordered     Problem: Metabolic/Fluid and Electrolytes - Adult  Goal: Electrolytes maintained within normal limits  11/12/2022 1058 by Carlyle Sierra RN  Outcome: Completed  11/12/2022 0025 by Manuel Johnson RN  Outcome: Progressing  Goal: Hemodynamic stability and optimal renal function maintained  11/12/2022 1058 by Carlyle Sierra RN  Outcome: Completed  11/12/2022 0025 by Manuel Johnson RN  Outcome: Progressing  Flowsheets (Taken 11/12/2022 0025)  Hemodynamic stability and optimal renal function maintained:   Monitor labs and assess for signs and symptoms of volume excess or deficit   Monitor intake, output and patient weight   Monitor urine specific gravity, serum osmolarity and serum sodium as indicated or ordered   Monitor response to interventions for patient's volume status, including labs, urine output, blood pressure (other measures as available)   Encourage oral intake as appropriate   Instruct patient on fluid and nutrition restrictions as appropriate

## 2022-11-13 LAB
CHOLESTEROL, TOTAL: 139 MG/DL (ref 0–199)
HDLC SERPL-MCNC: 24 MG/DL (ref 40–60)
LDL CHOLESTEROL CALCULATED: 101 MG/DL
TRIGL SERPL-MCNC: 70 MG/DL (ref 0–150)
VLDLC SERPL CALC-MCNC: 14 MG/DL

## 2022-11-13 NOTE — DISCHARGE SUMMARY
INTERNAL MEDICINE DEPARTMENT AT [address] [place] [address]  DISCHARGE SUMMARY    Patient ID: Jimi Sauer                                             Discharge Date: 11/13/2022   Patient's PCP: [facility]                                          Discharge Physician: Alfredo Sanchez MD  Admit Date: 11/9/2022   Admitting Physician: Juana Egan MD    PROBLEMS DURING HOSPITALIZATION:  Present on Admission:   Hyponatremia    Diagnoses on discharge:  New onset of diast CHF  CAP  Urinary retention  IBRAHIMA    HPI:  Moris Huerta is a 59-year-old male past medical history of atrial fibrillation on Eliquis, hypertension VERA on CPAP at home presented to the ED with 3-day history of cough. He got diagnosed with bronchitis recently at the [facility]. The cough became productive of reddish sputum with chest tightness but denied any chest pain or orthopnea. He denied any fevers, chills, but said he had occasional legs swelling for about a year which improves with exercise. He is followed by Dr. Jose J Krause. He came to the  ED today because the cough was not improving. In the ED he was hypertensive to 165/78, all VSS. Labs were significant for hyponatremia at 125, procal 0.19, and proBNP at 4944. CXR showed bibasilar airspace opacities. Patient received azithromycin and ceftriaxone as well as duonebs and admitted for further evaluation    The following issues were addressed during hospitalization:  New onset HFpEF  Cardiology was consulted and started Lasix 20 mg IV TID and transitioned  to BID  after progressive improvement in the bilateral edema. Patient's electrolytes, weight and urine output were monitored during this process. Patient was discharged on 40 mg Lasix p.o. daily    Community-acquired pneumonia  Patient had history of cough productive of reddish sputum and was has been diagnosed with bronchitis recently.  Patient received doxycycline 100 mg every 12 hours and ceftriaxone during admission with complete resolution of symptoms of pneumonia. Acute urinary retention with bilateral hydronephrosis  Patient developed acute urinary retention requiring straight cath. Urology was consulted and recommended discharge with Meza at home with outpatient follow-up after discharge. Patient continued on Flomax during admission. Throughout patient's admission other chronic conditions where addressed. Chronic atrial fibrillation, essential hypertension, GERD, and microcytic anemia. Patient was discharged home in stable condition, with follow-up with Dr. Mary Roger cardiology in 2 weeks, PCP in 1 week, Dr. Gwen Leone nephrology within 1 week and urology in 3 days after discharge.         Consults: cardiology, nephrology, and urology  Significant Diagnostic Studies:  chest x-ray, renal, US  Treatments: antibiotics: ceftriaxone and cardiac meds: furosemide  Disposition: home  Discharged Condition: Stable  Follow Up: Primary Care Physician in one week    DISCHARGE MEDICATION:       Medication List        START taking these medications      carvedilol 6.25 MG tablet  Commonly known as: COREG  Take 0.5 tablets by mouth 2 times daily (with meals)     ferrous sulfate 325 (65 Fe) MG tablet  Commonly known as: IRON 325  Take 1 tablet by mouth daily (with breakfast)     furosemide 40 MG tablet  Commonly known as: Lasix  Take 1 tablet by mouth daily     guaiFENesin-dextromethorphan 100-10 MG/5ML syrup  Commonly known as: ROBITUSSIN DM  Take 5 mLs by mouth every 4 hours as needed for Cough     potassium chloride 20 MEQ extended release tablet  Commonly known as: KLOR-CON M  Take 1 tablet by mouth daily     tamsulosin 0.4 MG capsule  Commonly known as: FLOMAX  Take 1 capsule by mouth daily            CHANGE how you take these medications      apixaban 2.5 MG Tabs tablet  Commonly known as: Eliquis  Take 2 tablets by mouth 2 times daily  What changed: how much to take            CONTINUE taking these medications      famotidine 40 MG tablet  Commonly known as: PEPCID            STOP taking these medications      amLODIPine 5 MG tablet  Commonly known as: NORVASC     sotalol 80 MG tablet  Commonly known as: BETAPACE               Where to Get Your Medications        These medications were sent to [place] [address] [id number] [address], [facility]      Phone: [phone]   apixaban 2.5 MG Tabs tablet  carvedilol 6.25 MG tablet  ferrous sulfate 325 (65 Fe) MG tablet  furosemide 40 MG tablet  guaiFENesin-dextromethorphan 100-10 MG/5ML syrup  potassium chloride 20 MEQ extended release tablet  tamsulosin 0.4 MG capsule          Activity: activity as tolerated  Diet: cardiac diet  Wound Care: none needed    Time Spent on discharge is non billable. Signed:  Ramón Willis MD,  MD, PGY-1  11/13/2022     Patient seen and examined, plan of care discussed with residents. Agree with their assessment and plan with following addendum:  Patient was anxious to discharge to home. Left prior to my visit, but was seen and cleared for discharge by cardiology, nephrology and urology. Med rec was reviewed.        Jordon Leone MD

## 2022-11-14 ENCOUNTER — CARE COORDINATION (OUTPATIENT)
Dept: CASE MANAGEMENT | Age: 72
End: 2022-11-14

## 2022-11-14 LAB
BLOOD CULTURE, ROUTINE: NORMAL
CULTURE, BLOOD 2: NORMAL

## 2022-11-14 NOTE — CARE COORDINATION
[facility] Care Transitions Initial Follow Up Call    Call within 2 business days of discharge:  yes     Care Transition Nurse contacted the patient by telephone to perform post hospital discharge assessment. Verified name with patient as identifier. Provided introduction to self, and explanation of the Care Transition Nurse role. Patient: Tej Garcia Patient :  1950  MRN: [id number]  Reason for Admission:  HYPONATREMIA  Discharge Date:    RARS: 14      Last Discharge  Street       Date Complaint Diagnosis Description Type Department Provider    22 Cough Hyponatremia . .. ED to Hosp-Admission (Discharged) (ADMITTED) [address] 4 PCU Roselyn Shultz MD; Leon Giles. .. Challenges to be reviewed by the provider   Additional needs identified to be addressed with provider:   NO    AMB CC Provider Discharge Needs:  none         Method of communication with provider : none      SUMMARY  CTC spoke to the Pt who reports the following:    -C/O cough / taking Robitussin cough syrup and finds it effective  -Urinary schofield catheter in place and will be glad when it is removed. Renal Scan tomorrow and schofield maybe removed on Thursday. -C/O dark urine - Pt reports he usually drinks lots of water and that is what caused hospitalization for hyponatremia. CTN referred pt to speak with dr regarding fluid intake recommendation. Pt denies presence of blood, mucous, or sediment in schofield bag. -RPM not eligible / has no PCP and referred to [facility] for [place]. Pt denies fever, chills, nausea, vomiting, congestion, SOB / DB / wheezing, chest pain, LE edema, restlessness, headhache, fatigue, and weakness. Pt denies issue with fluid intake (speak with doctor regarding fluid intake recommendation), appetite, and LBM was today. Reports he has BM daily. Pt declined to review all meds with CTC.   Pt declined assistance with scheduling HFU and states he will call [facility] and nephrologist.  Pt confirms Dr Mumtaz Mcelroy is not his PCP. Pt will complete lab work as recommended at a [facility] and has [place] with cardiology on 11/18 with NP Tadeo Del Cid and Dr Josse Coffman 11/29. Pt will take all meds as prescribed and schedule / keep doctor's appt. [facility] provided education on s/s that require medical attention and when to seek medical attention. [facility] advised Pt of use urgent care or physician's 24 hr access line if assistance is needed after hours or on the weekend. Patient denies any needs or concerns and is agreeable with additional calls. Was this a readmission? No      Care Transition Nurse reviewed discharge instructions, medical action plan and red flags with patient who verbalized understanding. Patient given an opportunity to ask questions and does not have any further questions or concerns at this time. Were discharge instructions available to patient? Yes    Reviewed appropriate site of care based on symptoms and resources available to patient including:     PCP  Specialist  After hours contact number-  Benefits related nurse triage line  Urgent care clinics  [facility] 24/7  Provider home visits  When to call [address] [place] [address] for reactivation or family member permission [phone]  Condition related references. The patient agrees to contact the PCP office for questions related to their healthcare. The patient agrees to contact the PCP office for questions related to their healthcare. Advance Care Planning:  Does patient have an Advance Directive: patient declined education    Pt verbalizes understanding of administration of home medications. Medications reviewed, 1111F entered: NA    Was patient discharged with a pulse oximeter?   No    Non-face-to-face services provided:  Obtained and reviewed discharge summary and/or continuity of care documents  Education of patient/family/caregiver/guardian to support self-management-  Assessment and support for treatment adherence and medication management-    Offered patient enrollment in the Remote Patient Monitoring (RPM) program for in-home monitoring:  NA - has no PCP     Care Transitions 24 Hour Call    Do you have a copy of your discharge instructions?: Yes  Do you have all of your prescriptions and are they filled?: Yes  Have you been contacted by a [facility] Pharmacist?: No  Have you scheduled your follow up appointment?: No  Do you feel like you have everything you need to keep you well at home?: Yes  Care Transitions Interventions            Future Appointments   Date Time Provider Lidia Donaldson   11/18/2022  2:15 PM LORETTA Chaudhry - [facility]   11/29/2022 10:30 AM MD Diane [facility]   [facility]2022 10:40 [facility]         CTN provided contact information for future needs. Plan for f/u call in 5-7 days based on severity of symptoms and risk factors. Plan for next call:   symptom management  self-management  follow up appointment  medication management    Alberto Caceres, BSN, RN  Care Transition Coordinator  Contact Number:  [phone]

## 2022-11-15 NOTE — TELEPHONE ENCOUNTER
The patient states he was prescribed Eliquis in the hospital and his insurance is not approving the dosing amount. Please call the patient to clarify the dosing instructions.  [phone]    apixaban (ELIQUIS) 2.5 MG TABS tablet [[id number]]     Order Details  Dose: 5 mg Route: Oral Frequency: 2 TIMES DAILY   Dispense Quantity: 60 tablet Refills: 5          Sig: Take 2 tablets by mouth 2 times daily

## 2022-11-15 NOTE — TELEPHONE ENCOUNTER
Called patient, Ken Ford is confusing as it is written. Should be Eliquis 5mg twice a day # 60 with 5 refills. Have adjusted this to reflect appropriate dose.

## 2022-11-21 ENCOUNTER — CARE COORDINATION (OUTPATIENT)
Dept: CASE MANAGEMENT | Age: 72
End: 2022-11-21

## 2022-11-21 NOTE — CARE COORDINATION
[facility] Transitions Initial Follow Up Call    Patient:  Taqueria Chase Patient :  1950  MRN:  [id number]   Reason for Admission:  hyponatremia   Discharge Date:  22  RARS: 14      Transitions of Care Initial Call    Was this an external facility discharge? no    Discharge Facility: [address] [place] [address] to be reviewed by the provider   Additional needs identified to be addressed with provider:    gael    AMB CC Provider Discharge Needs: none          CTC attempt to reach Pt regarding recent hospital discharge. CTC left voice recording with call back number requesting a call back. Follow up appointments:    Future Appointments   Date Time Provider Lidia Donaldson   2022 10:30 AM Maximiliano Contreras MD [facility]   12/15/2022 10:40 AM Samantha Baeza MD [facility]       SOILA Jacques, RN  Care Transition Coordinator  Contact Number:  [phone]

## 2022-11-28 ENCOUNTER — CARE COORDINATION (OUTPATIENT)
Dept: CASE MANAGEMENT | Age: 72
End: 2022-11-28

## 2022-11-28 NOTE — CARE COORDINATION
[facility] Transitions Initial Follow Up Call    Patient:  Estela Henning Patient :  1950  MRN:  [id number]   Reason for Admission:  hyponatremia   Discharge Date:  22  RARS: 14      Transitions of Care Initial Call    Was this an external facility discharge? no    Discharge Facility: [address] [place] [address] to be reviewed by the provider   Additional needs identified to be addressed with provider:    gael    AMB CC Provider Discharge Needs: none            CTC attempt to reach Pt regarding recent hospital discharge. CTC left voice recording with call back number requesting a call back. Ct episode closed. Follow up appointments:    Future Appointments   Date Time Provider Lidia Donaldson   2022 10:30 AM George Campoverde MD [place] [facility]   12/15/2022 10:40 AM Lenora Gallo MD [facility]       SOILA Henley, RN  Care Transition Coordinator  Contact Number:  [phone]

## 2022-11-29 ENCOUNTER — OFFICE VISIT (OUTPATIENT)
Dept: CARDIOLOGY CLINIC | Age: 72
End: 2022-11-29
Payer: MEDICARE

## 2022-11-29 VITALS
DIASTOLIC BLOOD PRESSURE: 80 MMHG | HEART RATE: 60 BPM | WEIGHT: 209 LBS | SYSTOLIC BLOOD PRESSURE: 130 MMHG | BODY MASS INDEX: 27.57 KG/M2

## 2022-11-29 DIAGNOSIS — I50.31 ACUTE DIASTOLIC CONGESTIVE HEART FAILURE (HCC): Primary | ICD-10-CM

## 2022-11-29 DIAGNOSIS — R00.2 PALPITATION: ICD-10-CM

## 2022-11-29 DIAGNOSIS — I34.0 NONRHEUMATIC MITRAL VALVE REGURGITATION: ICD-10-CM

## 2022-11-29 DIAGNOSIS — I48.20 CHRONIC ATRIAL FIBRILLATION (HCC): ICD-10-CM

## 2022-11-29 DIAGNOSIS — I50.31 ACUTE DIASTOLIC CONGESTIVE HEART FAILURE (HCC): ICD-10-CM

## 2022-11-29 PROBLEM — I50.22 CHRONIC SYSTOLIC (CONGESTIVE) HEART FAILURE (HCC): Status: ACTIVE | Noted: 2022-11-29

## 2022-11-29 LAB
ANION GAP SERPL CALCULATED.3IONS-SCNC: 17 MMOL/L (ref 3–16)
BUN BLDV-MCNC: 20 MG/DL (ref 7–20)
CALCIUM SERPL-MCNC: 10.2 MG/DL (ref 8.3–10.6)
CHLORIDE BLD-SCNC: 97 MMOL/L (ref 99–110)
CO2: 25 MMOL/L (ref 21–32)
CREAT SERPL-MCNC: 1.4 MG/DL (ref 0.8–1.3)
GFR SERPL CREATININE-BSD FRML MDRD: 53 ML/MIN/{1.73_M2}
GLUCOSE BLD-MCNC: 109 MG/DL (ref 70–99)
POTASSIUM SERPL-SCNC: 4.5 MMOL/L (ref 3.5–5.1)
PROSTATE SPECIFIC ANTIGEN: 11.09 NG/ML (ref 0–4)
SODIUM BLD-SCNC: 139 MMOL/L (ref 136–145)

## 2022-11-29 PROCEDURE — 1124F ACP DISCUSS-NO DSCNMKR DOCD: CPT | Performed by: INTERNAL MEDICINE

## 2022-11-29 PROCEDURE — 1111F DSCHRG MED/CURRENT MED MERGE: CPT | Performed by: INTERNAL MEDICINE

## 2022-11-29 PROCEDURE — G8427 DOCREV CUR MEDS BY ELIG CLIN: HCPCS | Performed by: INTERNAL MEDICINE

## 2022-11-29 PROCEDURE — G8417 CALC BMI ABV UP PARAM F/U: HCPCS | Performed by: INTERNAL MEDICINE

## 2022-11-29 PROCEDURE — 3017F COLORECTAL CA SCREEN DOC REV: CPT | Performed by: INTERNAL MEDICINE

## 2022-11-29 PROCEDURE — 1036F TOBACCO NON-USER: CPT | Performed by: INTERNAL MEDICINE

## 2022-11-29 PROCEDURE — 99214 OFFICE O/P EST MOD 30 MIN: CPT | Performed by: INTERNAL MEDICINE

## 2022-11-29 PROCEDURE — 3074F SYST BP LT 130 MM HG: CPT | Performed by: INTERNAL MEDICINE

## 2022-11-29 PROCEDURE — G8484 FLU IMMUNIZE NO ADMIN: HCPCS | Performed by: INTERNAL MEDICINE

## 2022-11-29 PROCEDURE — 3078F DIAST BP <80 MM HG: CPT | Performed by: INTERNAL MEDICINE

## 2022-11-29 NOTE — PROGRESS NOTES
Subjective:      Patient ID: Mona Servin is a 67 y.o. male. HPI Mookie Luo is here today for follow up afib/palp/mr. Recent hosp for resp issue/CHF/PNA and  issues. Cough better. Otherwise rhythm stable. Continues exercising. Walking and gym every other day. Celestia Subhash Rhythm good. No sob. Edema stable. No pnd or orthopnea. No syncope. Wt down.        Past Medical History:   Diagnosis Date    AF (atrial fibrillation) (HCC)     Anticoagulated     Aortic valve disease     leaking no problems    CKD (chronic kidney disease) stage 3, GFR 30-59 ml/min (HCC)     GERD (gastroesophageal reflux disease)     History of DVT (deep vein thrombosis)     HTN (hypertension)     Lymphoma ([facility] [address] 2015    B cell    Nephrolithiasis     VERA (obstructive sleep apnea)     Pure hypercholesterolemia      Past Surgical History:   Procedure Laterality Date    APPENDECTOMY      COLONOSCOPY  8/18/2006     Dr. Kurtis Galdamez - internal hemorrhoids , hyperplastic polyp     CYSTOSCOPY  1-11-10     Dr. Susanne Caro ureteral stent     LYMPH NODE BIOPSY  5/11/11    RIGHT CERVICAL    LYMPH NODE DISSECTION N/A 2011    TONSILLECTOMY      UPPER GASTROINTESTINAL ENDOSCOPY N/A 7/23/2022    EGD DIAGNOSTIC ONLY performed by Mya Edwards MD at [place] [address] N/A 9/21/2022    EGD BIOPSY performed by Erin Posadas MD at [facility] ENDOSCOPY         Allergies   Allergen Reactions    Gentamicin Other (See Comments)     Medication DOREEN: Gentamicin caused Ototoxicity in pt's father, so they avoid taking it at all costs (pt has never taken it) -- not a true allergy        Social History     Socioeconomic History    Marital status:      Spouse name: Not on file    Number of children: 0    Years of education: Not on file    Highest education level: Not on file   Occupational History    Occupation: retired   Tobacco Use    Smoking status: Never    Smokeless tobacco: Never   Vaping Use    Vaping Use: Never used   Substance and Sexual Activity    Alcohol use: No     Alcohol/week: 0.0 standard drinks    Drug use: No    Sexual activity: Yes     Partners: Female   Other Topics Concern    Not on file   Social History Narrative    Not on file     Social Determinants of Health     Financial Resource Strain: Not on file   Food Insecurity: Not on file   Transportation Needs: Not on file   Physical Activity: Not on file   Stress: Not on file   Social Connections: Not on file   Intimate Partner Violence: Not on file   Housing Stability: Not on file        Patient has a family history includes Heart Disease in his mother. Patient  has a past medical history of AF (atrial fibrillation) ([facility] Anticoagulated, Aortic valve disease, CKD (chronic kidney disease) stage 3, GFR 30-59 ml/min ([facility]), GERD (gastroesophageal reflux disease), History of DVT (deep vein thrombosis), HTN (hypertension), Lymphoma ([facility] Nephrolithiasis, VERA (obstructive sleep apnea), and Pure hypercholesterolemia. Current Outpatient Medications   Medication Sig Dispense Refill    apixaban (ELIQUIS) 5 MG TABS tablet Take 1 tablet by mouth 2 times daily 60 tablet 5    tamsulosin (FLOMAX) 0.4 MG capsule Take 1 capsule by mouth daily 30 capsule 3    furosemide (LASIX) 40 MG tablet Take 1 tablet by mouth daily 30 tablet 3    potassium chloride (KLOR-CON M) 20 MEQ extended release tablet Take 1 tablet by mouth daily 90 tablet 1    ferrous sulfate (IRON 325) 325 (65 Fe) MG tablet Take 1 tablet by mouth daily (with breakfast) 90 tablet 1    carvedilol (COREG) 6.25 MG tablet Take 0.5 tablets by mouth 2 times daily (with meals) 60 tablet 3    famotidine (PEPCID) 40 MG tablet Take 1 tablet by mouth at bedtime       No current facility-administered medications for this visit. Vitals:    11/29/22 1030   BP: 130/80   Pulse: 60     Wt 209    Review of Systems   Constitutional: Negative for activity change and fatigue.    Respiratory: Negative for apnea, cough, choking, chest tightness and shortness of breath. Cardiovascular: Negative for chest pain, palpitations and leg swelling. No PND or orthopnea. No tachycardia. Gastrointestinal: Negative for abdominal distention. Musculoskeletal: Negative for myalgias. Neurological: Negative for dizziness, syncope and light-headedness. Psychiatric/Behavioral: Negative for behavioral problems, confusion and agitation. All other systems reviewed negative as done. Objective:   Physical Exam   Constitutional: He is oriented to person, place, and time. He appears well-developed and well-nourished. No distress. HENT:   Head: Normocephalic and atraumatic. Eyes: Conjunctivae and EOM are normal. Right eye exhibits no discharge. Left eye exhibits no discharge. Neck: Normal range of motion. Neck supple. No JVD present. Cardiovascular:  irreg/irregRate rhythm, S1 normal, S2 normal and normal heart sounds. Exam reveals no gallop. Pulses:       Radial pulses are 2+ on the right side, and 2+ on the left side. Pulmonary/Chest: Effort normal and breath sounds normal. No respiratory distress. He has no wheezes. He has no rales. Abdominal: Soft. Bowel sounds are normal. No tenderness. Musculoskeletal: Normal range of motion. He exhibits no edema. Tr-+ R  Neurological: He is alert and oriented to person, place, and time. Skin: Skin is warm and dry. Psychiatric: He has a normal mood and affect. His behavior is normal. Thought content normal.       Assessment:         Diagnosis Orders   1. Acute diastolic congestive heart failure ([address]        2. Chronic atrial fibrillation (HCC)        3. Palpitation        4. Nonrheumatic mitral valve regurgitation                  Plan:      CV stable. Edema controlled. He feels due to norvasc. Now off. On lasix. Will have EPl. On eliquis. Active. Rhythm stable outside of vaccine. HR normal.   Continues exercising. On AC. No bleeding issues. Continue coreg 6.25 mg bid.     Remains compensated. No changes. Reviewed previous records and testing including echo 11/22. Continue to monitor. Follow up 4-6 wks.

## 2022-12-07 ENCOUNTER — TELEPHONE (OUTPATIENT)
Dept: CARDIOLOGY CLINIC | Age: 72
End: 2022-12-07

## 2022-12-07 NOTE — TELEPHONE ENCOUNTER
Spoke with pt and confirmed all the cardiac medications Dr. Ila Hernandez has prescribed. Pt verbalized understanding.

## 2022-12-07 NOTE — TELEPHONE ENCOUNTER
Patient is calling back regarding his medications Carvedilol, Tamsulosin, Furosemide and his Potassium Chloride.  [phone]

## 2022-12-07 NOTE — TELEPHONE ENCOUNTER
The patient called and would like to clarify which heart medications he should be taking. Please call the patient back at [phone].

## 2022-12-15 ENCOUNTER — TELEMEDICINE (OUTPATIENT)
Dept: PULMONOLOGY | Age: 72
End: 2022-12-15
Payer: MEDICARE

## 2022-12-15 DIAGNOSIS — I48.20 CHRONIC ATRIAL FIBRILLATION (HCC): Chronic | ICD-10-CM

## 2022-12-15 DIAGNOSIS — G47.33 OBSTRUCTIVE SLEEP APNEA: Primary | Chronic | ICD-10-CM

## 2022-12-15 DIAGNOSIS — I10 ESSENTIAL HYPERTENSION: Chronic | ICD-10-CM

## 2022-12-15 PROCEDURE — 1124F ACP DISCUSS-NO DSCNMKR DOCD: CPT | Performed by: INTERNAL MEDICINE

## 2022-12-15 PROCEDURE — 3017F COLORECTAL CA SCREEN DOC REV: CPT | Performed by: INTERNAL MEDICINE

## 2022-12-15 PROCEDURE — G8427 DOCREV CUR MEDS BY ELIG CLIN: HCPCS | Performed by: INTERNAL MEDICINE

## 2022-12-15 PROCEDURE — 99214 OFFICE O/P EST MOD 30 MIN: CPT | Performed by: INTERNAL MEDICINE

## 2022-12-15 ASSESSMENT — SLEEP AND FATIGUE QUESTIONNAIRES
HOW LIKELY ARE YOU TO NOD OFF OR FALL ASLEEP WHILE SITTING INACTIVE IN A PUBLIC PLACE: 0
HOW LIKELY ARE YOU TO NOD OFF OR FALL ASLEEP WHEN YOU ARE A PASSENGER IN A CAR FOR AN HOUR WITHOUT A BREAK: 0
HOW LIKELY ARE YOU TO NOD OFF OR FALL ASLEEP WHILE LYING DOWN TO REST IN THE AFTERNOON WHEN CIRCUMSTANCES PERMIT: 3
HOW LIKELY ARE YOU TO NOD OFF OR FALL ASLEEP WHILE SITTING QUIETLY AFTER LUNCH WITHOUT ALCOHOL: 0
HOW LIKELY ARE YOU TO NOD OFF OR FALL ASLEEP WHILE SITTING AND READING: 0
HOW LIKELY ARE YOU TO NOD OFF OR FALL ASLEEP IN A CAR, WHILE STOPPED FOR A FEW MINUTES IN TRAFFIC: 0
HOW LIKELY ARE YOU TO NOD OFF OR FALL ASLEEP WHILE SITTING AND TALKING TO SOMEONE: 0
ESS TOTAL SCORE: 3
HOW LIKELY ARE YOU TO NOD OFF OR FALL ASLEEP WHILE WATCHING TV: 0

## 2022-12-15 NOTE — ASSESSMENT & PLAN NOTE
Chronic-not Stable: Reviewed and analyzed results of physiologic download from patient's machine and reviewed with patient. Supplies and parts as needed for his machine. These are medically necessary. Limit caffeine use after 3pm. Based on the analyzed data will continue with current settings. Patient has failed auto CPAP and needs an in lab titration to find correct settings to control her sleep apnea fully.

## 2022-12-15 NOTE — PROGRESS NOTES
Sherita Sepulveda CNP [address]  [address] [facility]  [address], [address] [facility]- [phone]   [facility] [address] Carroll Waite. [address] [place] [address]. Yazan M[address] [phone]     Video Visit- Follow up    Tory Bermudez, was evaluated through a synchronous (real-time) audio-video  encounter. The patient (or guardian if applicable) is aware that this is a billable  service, which includes applicable co-pays. This Virtual Visit was conducted with  patient's (and/or legal guardian's) consent. The visit was conducted pursuant to  the emergency declaration under the [address] [place] [address] [facility] authority and the [employer] and  [employer] General Act. Patient identification was verified,  and a caregiver was present when appropriate. The patient was located in a  state where the provider was licensed to provide care. Assessment/Plan:      1. Obstructive sleep apnea  Assessment & Plan:  Chronic-not Stable: Reviewed and analyzed results of physiologic download from patient's machine and reviewed with patient. Supplies and parts as needed for his machine. These are medically necessary. Limit caffeine use after 3pm. Based on the analyzed data will continue with current settings. Patient has failed auto CPAP and needs an in lab titration to find correct settings to control her sleep apnea fully. Orders:  -     Sleep Study with PAP Titration; Future  2. Chronic atrial fibrillation (HCC)  Assessment & Plan:  Chronic- Stable. Discussed the importance of treating sleep apnea as part of the management of this disorder. Cont any meds per PCP and other physicians. 3. Essential hypertension  Assessment & Plan:  Chronic- Stable. Discussed the importance of treating sleep apnea as part of the management of this disorder. Cont any meds per PCP and other physicians.      Reviewed, analyzed, and documented physiologic data from patient's PAP machine. This information was analyzed to assess complexity and medical decision making in regards to further testing and management. The primary encounter diagnosis was Obstructive sleep apnea. Diagnoses of Chronic atrial fibrillation ([address] and Essential hypertension were also pertinent to this visit. The chronic medical conditions listed are directly related to the primary diagnosis listed above. The management of the primary diagnosis affects the secondary diagnosis and vice versa. Subjective:     Patient ID: Yoli Huizar is a 67 y.o. male. Chief Complaint   Patient presents with    Sleep Apnea     Subjective   HPI:    Machine Modem/Download Info:  Compliance (hours/night): 8.15 hrs/night  % of nights >= 4 hrs: 88.9 %  Download AHI (/hour): 13.4 /HR  Average CPAP Pressure : 16 cmH2O APAP - Settings  Pressure Min: 11 cmH2O  Pressure Max: 18 cmH2O                 Comfort Settings  Flex/EPR (0-3): 3       Feels he was doing well with his machine but the download still shows a sleep apnea is not fully controlled. He has had several issues more recently and has been in the hospital with volume overload and possible diastolic heart failure. Given some the congestion issues he has had he is not sleeping as well.     [facility]    Umpire - Umpire Sleepiness Score: 3    Current Outpatient Medications   Medication Instructions    apixaban (ELIQUIS) 5 mg, Oral, 2 TIMES DAILY    carvedilol (COREG) 3.125 mg, Oral, 2 TIMES DAILY WITH MEALS    famotidine (PEPCID) 40 MG tablet 1 tablet, Oral, Nightly    ferrous sulfate (IRON 325) 325 mg, Oral, DAILY WITH BREAKFAST    furosemide (LASIX) 40 mg, Oral, DAILY    potassium chloride (KLOR-CON M) 20 MEQ extended release tablet 20 mEq, Oral, DAILY    tamsulosin (FLOMAX) 0.4 mg, Oral, DAILY        Electronically signed by Paolo Manriquez MD on 12/15/2022 at 11:05 AM

## 2022-12-16 ENCOUNTER — TELEPHONE (OUTPATIENT)
Dept: SLEEP CENTER | Age: 72
End: 2022-12-16

## 2022-12-16 NOTE — TELEPHONE ENCOUNTER
Called to schedule a CPAP per Amanda Weeks     Left  for the pt to return my call     Medicare insurance

## 2022-12-20 ENCOUNTER — TELEPHONE (OUTPATIENT)
Dept: CARDIOLOGY CLINIC | Age: 72
End: 2022-12-20

## 2022-12-20 NOTE — TELEPHONE ENCOUNTER
Patient called and said that he thinks that his Coreg is making him feel extremely drowsy everyday around 11-1p and it last for about 30 minutes and then goes away.  Please advise [phone]

## 2022-12-22 ENCOUNTER — TELEPHONE (OUTPATIENT)
Dept: CARDIOLOGY CLINIC | Age: 72
End: 2022-12-22

## 2022-12-22 NOTE — TELEPHONE ENCOUNTER
Spoke with pt. During last hospitalization (11/2022), his sotalol was stopped due to chronic AF, amlodipine stopped due to LE edema, and started on Coreg. Since that time, he has noticed that he gets \"drowsy\" about 30-40 min after taking the Coreg. It isn't everyday and only lasts about 30 min. His SBP doesn't seem to be as well controlled on Coreg as well, reporting -140. He has not been writing down his HR. He wishes to wait on any changes until he sees DW on 1/6/23. Asked that he add HR to his daily BP check and bring this log to OV. He also wishes to talk to DW about concerns regarding prostate biopsy on 1/12/22.

## 2022-12-22 NOTE — TELEPHONE ENCOUNTER
Spoke with pt. Advised that we will keep an eye out for clearance for his biopsy. Recommended that he discuss his concerns regarding his surgery and heart condition with DW on 1/6.

## 2022-12-22 NOTE — TELEPHONE ENCOUNTER
Patient returned a missed call. Patient also stated that Dr. Maine Restrepo will also get a sign off sheet for the biopsy. He stated the appt on the 1/6/23 will have a lot to do with that. The doctor for the cardiac clearance was from Dr. Stephen Diaz. He stated that Dr. Maine Restrepo really needs to pay attention to his medicine.  Patient wants a call back at [phone]

## 2023-01-06 ENCOUNTER — TELEPHONE (OUTPATIENT)
Dept: CARDIOLOGY CLINIC | Age: 73
End: 2023-01-06

## 2023-01-06 NOTE — PROGRESS NOTES
[facility]     Outpatient Cardiology         Patient Name:  Bard Connor  Requesting Physician: No admitting provider for patient encounter. Primary Care Physician: On File Not (Inactive)    Reason for Consultation/Chief Complaint:   Preop clearance      HPI:     67year old male with history of AF, CHF, HTN, he is a current patient of Dr. Chapo Crawley, being seen today for follow up and cardiac clearance. Past medical history of CHF, AF, CKD, HTN, aortic disease. Patient denies chest pain, sob, orthopnea, pnd, edema. Can walk up two flights of stairs without any symptoms. He requires cardiac clearance regarding prostate biopsy, this is scheduled on 1/12/23 with Dr. Alma Delia Brewster with Urology. Histories:     Past Medical History:   has a past medical history of AF (atrial fibrillation) ([facility] [address] Anticoagulated, Aortic valve disease, CKD (chronic kidney disease) stage 3, GFR 30-59 ml/min (HCC), GERD (gastroesophageal reflux disease), History of DVT (deep vein thrombosis), HTN (hypertension), Lymphoma ([facility] [address] Nephrolithiasis, VERA (obstructive sleep apnea), and Pure hypercholesterolemia. Surgical History:   has a past surgical history that includes Cystoscopy (1-11-10); Colonoscopy (8/18/2006 ); Appendectomy; lymph node biopsy (5/11/11); lymph node dissection (N/A, 2011); Tonsillectomy; Upper gastrointestinal endoscopy (N/A, 7/23/2022); and Upper gastrointestinal endoscopy (N/A, 9/21/2022). Social History:   reports that he has never smoked. He has never used smokeless tobacco. He reports that he does not drink alcohol and does not use drugs. Family History:  No evidence for sudden cardiac death or premature CAD    Medications:     Home Medications:  Were reviewed and are listed in nursing record. and/or listed below    Prior to Admission medications    Medication Sig Start Date End Date Taking?  Authorizing Provider   apixaban (ELIQUIS) 5 MG TABS tablet Take 1 tablet by mouth 2 times daily 11/16/22  Yes Fareed Pope MD   tamsulosin [facility]) 0.4 MG capsule Take 1 capsule by mouth daily 11/12/22  Yes Marita Pedraza MD   furosemide (LASIX) 40 MG tablet Take 1 tablet by mouth daily 11/12/22  Yes Marita Pedraza MD   potassium chloride (KLOR-CON M) 20 MEQ extended release tablet Take 1 tablet by mouth daily 11/12/22  Yes Marita Pedraza MD   ferrous sulfate (IRON 325) 325 (65 Fe) MG tablet Take 1 tablet by mouth daily (with breakfast) 11/12/22  Yes Marita Pedraza MD   carvedilol (COREG) 6.25 MG tablet Take 0.5 tablets by mouth 2 times daily (with meals) 11/12/22  Yes Marita Pedraza MD   famotidine (PEPCID) 40 MG tablet Take 1 tablet by mouth at bedtime 10/26/22  Yes Historical Provider, MD        Allergy:     Gentamicin     Review of Systems:     Review of Systems   Constitutional:  Negative for chills and fever. Respiratory:          See HPI   Cardiovascular:         See HPI. Gastrointestinal:  Negative for abdominal pain and vomiting. Endocrine: Negative. Genitourinary: Negative. Skin: Negative. Psychiatric/Behavioral: Negative. All other systems reviewed and are negative. Physical Examination:     Vitals:    01/09/23 1355   BP: 134/78   Pulse: 80   Weight: 213 lb 3.2 oz (96.7 kg)   Height: 6' 1\" (1.854 m)       Wt Readings from Last 3 Encounters:   01/09/23 213 lb 3.2 oz (96.7 kg)   11/29/22 209 lb (94.8 kg)   11/12/22 201 lb 15.1 oz (91.6 kg)       Physical Exam  Constitutional:       Appearance: Normal appearance. HENT:      Head: Normocephalic and atraumatic. Nose: Nose normal.   Eyes:      Conjunctiva/sclera: Conjunctivae normal.   Cardiovascular:      Rate and Rhythm: Normal rate. Rhythm irregular. Heart sounds: Normal heart sounds. Pulmonary:      Effort: Pulmonary effort is normal.      Breath sounds: Normal breath sounds. Abdominal:      Palpations: Abdomen is soft. Musculoskeletal:      Cervical back: Neck supple. Skin:     General: Skin is warm and dry. Neurological:      General: No focal deficit present. Mental Status: He is alert.          Labs:     Lab Results   Component Value Date    WBC 10.1 11/12/2022    HGB 12.2 (L) 11/12/2022    HCT 37.3 (L) 11/12/2022    MCV 74.3 (L) 11/12/2022     11/12/2022     Lab Results   Component Value Date     11/29/2022    K 4.5 11/29/2022    CL 97 (L) 11/29/2022    CO2 25 11/29/2022    BUN 20 11/29/2022    CREATININE 1.4 (H) 11/29/2022    GLUCOSE 109 (H) 11/29/2022    CALCIUM 10.2 11/29/2022    PROT 7.0 11/09/2022    LABALBU 4.1 11/09/2022    BILITOT 1.1 (H) 11/09/2022    ALKPHOS 82 11/09/2022    AST 25 11/09/2022    ALT 15 11/09/2022    LABGLOM 53 (A) 11/29/2022    GFRAA >60 07/24/2022    AGRATIO 1.1 10/21/2016         Lab Results   Component Value Date    CHOL 139 11/10/2022    CHOL 233 (H) 10/21/2016    CHOL 216 10/11/2012     Lab Results   Component Value Date    TRIG 70 11/10/2022    TRIG 113 10/21/2016    TRIG 122 10/11/2012     Lab Results   Component Value Date    HDL 24 (L) 11/10/2022    HDL 40 10/21/2016    HDL 31 (A) 10/11/2012     Lab Results   Component Value Date    LDLCHOLESTEROL 170 (H) 10/21/2016    LDLCALC 101 (H) 11/10/2022    LDLCALC 161 (A) 10/11/2012    LDLCALC 183 (A) 06/07/2012     Lab Results   Component Value Date    LABVLDL 14 11/10/2022     No results found for: [facility]    Lab Results   Component Value Date    INR 1.43 (H) 07/24/2022    INR 4.03 (H) 07/23/2022    INR 3.36 (H) 07/22/2022    PROTIME 17.4 (H) 07/24/2022    PROTIME 39.6 (H) 07/23/2022    PROTIME 34.3 (H) 07/22/2022       The 10-year ASCVD risk score (Brian SANCHEZ, et al., 2019) is: 30.4%    Values used to calculate the score:      Age: 67 years      Sex: Male      Is Non- : No      Diabetic: No      Tobacco smoker: No      Systolic Blood Pressure: 914 mmHg      Is BP treated: Yes      HDL Cholesterol: 24 mg/dL      Total Cholesterol: 139 mg/dL      Imaging:       ECG (if available, Personally interpreted):    Last Monitor/Holter (if available):    Last Stress (if available):    Last Cath (if available):    Last TTE/EDIE(if available):    Last CMR  (if available):    Last Coronary Artery Calcium Score: Ankle-brachial index:    Carotid ultrasound screening:    Abdominal aortic aneurysm screening:    Assessment / Plan:     1. Preop cardiovascular exam    2. Chronic diastolic congestive heart failure ([address]    3. Chronic atrial fibrillation ([address]    4. Hypertension, unspecified type       Overall, at low perioperative risk of cardiovascular complications  Stop eliquis 2 days preprocedure, resume when cleared by urology  Would like to f/u with Dr Iván Mi to discuss resuming sotalol. Bp at goal, continue coreg      Orders Placed This Encounter   Procedures    EKG 12 lead          Return in about 4 weeks (around 2/6/2023). The note was completed using EMR and Dragon dictation system. Every effort was made to ensure accuracy; however, inadvertent computerized transcription errors may be present. All questions and concerns were addressed to the patient. I would like to thank you for providing me the opportunity to participate in the care of your patient. If you have any questions, please do not hesitate to contact me.      Dc Lentz MD, [facility]  The [address]  [address] [place] [id number]  Main Office Phone: [phone]  Fax: [phone]

## 2023-01-06 NOTE — TELEPHONE ENCOUNTER
Pt was to See Te today for a CC, is a whang pt. He has a procedure on 1/12/23 at the [facility]. His appt was moved from today to Monday. He is needing to stop his eliquis Monday morning. But unsure if its safe to do. Will call to let us know if he will keep procedure on 1/12 or not. Please advise as to what pt can do if he needs to stop Monday but unsure if he is clear until afternoon.

## 2023-01-09 ENCOUNTER — OFFICE VISIT (OUTPATIENT)
Dept: CARDIOLOGY CLINIC | Age: 73
End: 2023-01-09
Payer: MEDICARE

## 2023-01-09 VITALS
SYSTOLIC BLOOD PRESSURE: 134 MMHG | HEART RATE: 80 BPM | HEIGHT: 73 IN | DIASTOLIC BLOOD PRESSURE: 78 MMHG | WEIGHT: 213.2 LBS | BODY MASS INDEX: 28.26 KG/M2

## 2023-01-09 DIAGNOSIS — I50.32 CHRONIC DIASTOLIC CONGESTIVE HEART FAILURE (HCC): ICD-10-CM

## 2023-01-09 DIAGNOSIS — I10 HYPERTENSION, UNSPECIFIED TYPE: ICD-10-CM

## 2023-01-09 DIAGNOSIS — Z01.810 PREOP CARDIOVASCULAR EXAM: Primary | ICD-10-CM

## 2023-01-09 DIAGNOSIS — I48.20 CHRONIC ATRIAL FIBRILLATION (HCC): ICD-10-CM

## 2023-01-09 PROCEDURE — 1036F TOBACCO NON-USER: CPT | Performed by: INTERNAL MEDICINE

## 2023-01-09 PROCEDURE — 1124F ACP DISCUSS-NO DSCNMKR DOCD: CPT | Performed by: INTERNAL MEDICINE

## 2023-01-09 PROCEDURE — G8427 DOCREV CUR MEDS BY ELIG CLIN: HCPCS | Performed by: INTERNAL MEDICINE

## 2023-01-09 PROCEDURE — G8484 FLU IMMUNIZE NO ADMIN: HCPCS | Performed by: INTERNAL MEDICINE

## 2023-01-09 PROCEDURE — G8417 CALC BMI ABV UP PARAM F/U: HCPCS | Performed by: INTERNAL MEDICINE

## 2023-01-09 PROCEDURE — 93000 ELECTROCARDIOGRAM COMPLETE: CPT | Performed by: INTERNAL MEDICINE

## 2023-01-09 PROCEDURE — 99214 OFFICE O/P EST MOD 30 MIN: CPT | Performed by: INTERNAL MEDICINE

## 2023-01-09 PROCEDURE — 3017F COLORECTAL CA SCREEN DOC REV: CPT | Performed by: INTERNAL MEDICINE

## 2023-01-09 PROCEDURE — 3078F DIAST BP <80 MM HG: CPT | Performed by: INTERNAL MEDICINE

## 2023-01-09 PROCEDURE — 3074F SYST BP LT 130 MM HG: CPT | Performed by: INTERNAL MEDICINE

## 2023-01-09 ASSESSMENT — ENCOUNTER SYMPTOMS
ABDOMINAL PAIN: 0
VOMITING: 0

## 2023-02-16 ENCOUNTER — OFFICE VISIT (OUTPATIENT)
Dept: CARDIOLOGY CLINIC | Age: 73
End: 2023-02-16

## 2023-02-16 VITALS
DIASTOLIC BLOOD PRESSURE: 80 MMHG | BODY MASS INDEX: 29.16 KG/M2 | SYSTOLIC BLOOD PRESSURE: 130 MMHG | HEART RATE: 62 BPM | WEIGHT: 221 LBS

## 2023-02-16 DIAGNOSIS — I50.32 CHRONIC DIASTOLIC CONGESTIVE HEART FAILURE (HCC): ICD-10-CM

## 2023-02-16 DIAGNOSIS — Z01.818 PRE-OP EVALUATION: Primary | ICD-10-CM

## 2023-02-16 DIAGNOSIS — I34.0 NONRHEUMATIC MITRAL VALVE REGURGITATION: ICD-10-CM

## 2023-02-16 DIAGNOSIS — I48.20 CHRONIC ATRIAL FIBRILLATION (HCC): ICD-10-CM

## 2023-02-16 DIAGNOSIS — R00.2 PALPITATION: ICD-10-CM

## 2023-02-16 NOTE — PROGRESS NOTES
Subjective:      Patient ID: Maryse Garcia is a 67 y.o. male. SÁNCHEZ Rowley is here today for follow up afib/palp/mr. Needs urolift. CV stable. Rhythm stable. No dizzy spells. Walking some but hematuria. No sob. Edema stable. No pnd or orthopnea. No syncope. Wt up some.        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 Chandler Pepe - internal hemorrhoids , hyperplastic polyp     CYSTOSCOPY  1-11-10     Dr. Omar Harding ureteral stent     LYMPH NODE BIOPSY  5/11/11    RIGHT CERVICAL    LYMPH NODE DISSECTION N/A 2011    TONSILLECTOMY      UPPER GASTROINTESTINAL ENDOSCOPY N/A 7/23/2022    EGD DIAGNOSTIC ONLY performed by Graciela Faith MD at [address] [place] N/A 9/21/2022    EGD BIOPSY performed by Artem Taylor MD at [address] ENDOSCOPY         Allergies   Allergen Reactions    Gentamicin Other (See Comments)     Medication DOREEN: Gentamicin caused Ototoxicity in pt's father, so they avoid taking it at all costs (pt has never taken it) -- not a true allergy        Social History     Socioeconomic History    Marital status:      Spouse name: Not on file    Number of children: 0    Years of education: Not on file    Highest education level: Not on file   Occupational History    Occupation: retired   Tobacco Use    Smoking status: Never    Smokeless tobacco: Never   Vaping Use    Vaping Use: Never used   Substance and Sexual Activity    Alcohol use: No     Alcohol/week: 0.0 standard drinks    Drug use: No    Sexual activity: Yes     Partners: Female   Other Topics Concern    Not on file   Social History Narrative    Not on file     Social Determinants of Health     Financial Resource Strain: Not on file   Food Insecurity: Not on file   Transportation Needs: Not on file   Physical Activity: Not on file   Stress: Not on file   Social Connections: Not on file   Intimate Partner Violence: Not on file   Housing Stability: Not on file        Patient has a family history includes Heart Disease in his mother. Patient  has a past medical history of AF (atrial fibrillation) ([facility] [address] Anticoagulated, Aortic valve disease, CKD (chronic kidney disease) stage 3, GFR 30-59 ml/min ([facility]), GERD (gastroesophageal reflux disease), History of DVT (deep vein thrombosis), HTN (hypertension), Lymphoma ([facility] [address] Nephrolithiasis, VERA (obstructive sleep apnea), and Pure hypercholesterolemia. Current Outpatient Medications   Medication Sig Dispense Refill    apixaban (ELIQUIS) 5 MG TABS tablet Take 1 tablet by mouth 2 times daily 60 tablet 5    tamsulosin (FLOMAX) 0.4 MG capsule Take 1 capsule by mouth daily 30 capsule 3    furosemide (LASIX) 40 MG tablet Take 1 tablet by mouth daily 30 tablet 3    potassium chloride (KLOR-CON M) 20 MEQ extended release tablet Take 1 tablet by mouth daily 90 tablet 1    ferrous sulfate (IRON 325) 325 (65 Fe) MG tablet Take 1 tablet by mouth daily (with breakfast) 90 tablet 1    carvedilol (COREG) 6.25 MG tablet Take 0.5 tablets by mouth 2 times daily (with meals) 60 tablet 3    famotidine (PEPCID) 40 MG tablet Take 1 tablet by mouth at bedtime       No current facility-administered medications for this visit. Vitals:    02/16/23 0939   BP: 130/80   Pulse: 62         Wt 209    Review of Systems   Constitutional: Negative for activity change and fatigue. Respiratory: Negative for apnea, cough, choking, chest tightness and shortness of breath.     Cardiovascular: Negative for chest pain, palpitations and leg swelling. No PND or orthopnea. No tachycardia. Gastrointestinal: Negative for abdominal distention. Musculoskeletal: Negative for myalgias. Neurological: Negative for dizziness, syncope and light-headedness. Psychiatric/Behavioral: Negative for behavioral problems, confusion and agitation. All other systems reviewed negative as done. Objective:   Physical Exam   Constitutional: He is oriented to person, place, and time. He appears well-developed and well-nourished. No distress. HENT:   Head: Normocephalic and atraumatic. Eyes: Conjunctivae and EOM are normal. Right eye exhibits no discharge. Left eye exhibits no discharge. Neck: Normal range of motion. Neck supple. No JVD present. Cardiovascular:  irreg/irregRate rhythm, S1 normal, S2 normal and normal heart sounds. Exam reveals no gallop. Pulses:       Radial pulses are 2+ on the right side, and 2+ on the left side. Pulmonary/Chest: Effort normal and breath sounds normal. No respiratory distress. He has no wheezes. He has no rales. Abdominal: Soft. Bowel sounds are normal. No tenderness. Musculoskeletal: Normal range of motion. He exhibits no edema. Tr-+ R  Neurological: He is alert and oriented to person, place, and time. Skin: Skin is warm and dry. Psychiatric: He has a normal mood and affect. His behavior is normal. Thought content normal.       Assessment:         Diagnosis Orders   1. Pre-op evaluation        2. Chronic diastolic congestive heart failure ([address]        3. Chronic atrial fibrillation (HCC)        4. Palpitation        5. Nonrheumatic mitral valve regurgitation                Plan:      CV stable. Edema controlled. On eliquis. Active. Rhythm stable   HR normal.   Continues exercising. On AC. No bleeding issues. EKG today shows afib, controlled. He is reasonable candiate for urolift. Continue coreg 6.25 mg bid. Remains compensated. No changes.  Reviewed previous records and testing including echo 11/22. Continue to monitor. Follow up 3 months.

## 2023-03-06 ENCOUNTER — TELEPHONE (OUTPATIENT)
Dept: CARDIOLOGY CLINIC | Age: 73
End: 2023-03-06

## 2023-03-06 NOTE — TELEPHONE ENCOUNTER
Alvaro Hyatt from the Urology group called and stated she did receive a letter stating patient was cleared for surgery. Now she said she needs a letter stating that the patient has to be off his blood thinners and the time frame. She stated she didn't need a call back.  You can just fax the letter over to  [phone] Fax

## 2023-03-08 RX ORDER — CARVEDILOL 6.25 MG/1
TABLET ORAL
Qty: 60 TABLET | Refills: 3 | OUTPATIENT
Start: 2023-03-08

## 2023-03-14 RX ORDER — CARVEDILOL 6.25 MG/1
TABLET ORAL
Qty: 60 TABLET | Refills: 3 | OUTPATIENT
Start: 2023-03-14

## 2023-05-08 RX ORDER — POTASSIUM CHLORIDE 1500 MG/1
TABLET, EXTENDED RELEASE ORAL
Qty: 90 TABLET | OUTPATIENT
Start: 2023-05-08

## 2023-05-10 ENCOUNTER — HOSPITAL ENCOUNTER (OUTPATIENT)
Dept: SLEEP CENTER | Age: 73
Discharge: HOME | End: 2023-05-10
Payer: MEDICARE

## 2023-05-10 DIAGNOSIS — G47.33 OBSTRUCTIVE SLEEP APNEA: Chronic | ICD-10-CM

## 2023-05-10 PROCEDURE — 95811 POLYSOM 6/>YRS CPAP 4/> PARM: CPT

## 2023-05-15 ENCOUNTER — TELEPHONE (OUTPATIENT)
Dept: PULMONOLOGY | Age: 73
End: 2023-05-15

## 2023-05-15 NOTE — PROGRESS NOTES
Michelle Navarro         : 1950   [facility]    Diagnosis: [x] VERA (G47.33) [] CSA (G47.31) [] Apnea (G47.30)   Length of Need: [x] 18 Months [] 99 Months [] Other:    Machine (RENETTA!): [] Respironics Dream Station   2   Auto [x] ResMed AirSense     Auto S10 [] Other:     []  CPAP () [x] Bilevel ()   Mode: [] Auto [] Spontaneous    Mode: [x] Auto [] Spontaneous       IPAP max 25 cmH2O  EPAP min 19 cmH2O  PS 4 cmH2O     Comfort Settings:   - Ramp Pressure 8 cmH2O                                        - Ramp time: 15 min                                     -  Flex/EPR - 3 full time                                    - For ResMed Bilevel (TiMax-4 sec   TiMin- 0.2 sec)     Humidifier: [x] Heated ()        [x] Water chamber replacement ()/ 1 per 6 months        Mask:   [] Nasal () /1 per 3 months [x] Full Face () /1 per 3 months   [] Patient choice -Size and fit mask [x] Patient Choice - Size and fit mask   [] Dispense:  [x] Dispense: Med Simplus FFM   [] Headgear () / 1 per 3 months [x] Headgear () / 1 per 3 months   [] Replacement Nasal Cushion ()/2 per month [x] Interface Replacement ()/1 per month   [] Replacement Nasal Pillows ()/2 per month         Tubing: [x] Heated ()/1 per 3 months    [] Standard ()/1 per 3 months [] Other:           Filters: [x] Non-disposable ()/1 per 6 months     [x] Ultra-Fine, Disposable ()/2 per month        Miscellaneous: [] Chin Strap ()/ 1 per 6 months [] O2 bleed-in:       LPM   [] Oximetry on CPAP/Bilevel []  Other:    [x] Modem: ()         Start Order Date: 05/15/23    MEDICAL JUSTIFICATION:  I, the undersigned, certify that the above prescribed supplies are medically necessary for this patients wellbeing. In my opinion, the supplies are both reasonable and necessary in reference to accepted standards of medicalpractice in treatment of this patients condition.     Joceline Jones MD      NPI:

## 2023-05-15 NOTE — TELEPHONE ENCOUNTER
Spoke with pt to review titration study. Order to be sent to [facility] per pt.  Pt to schedule f/u

## 2023-05-18 RX ORDER — APIXABAN 5 MG/1
TABLET, FILM COATED ORAL
Qty: 180 TABLET | Refills: 3 | Status: SHIPPED | OUTPATIENT
Start: 2023-05-18

## 2023-05-22 ENCOUNTER — TELEPHONE (OUTPATIENT)
Dept: PULMONOLOGY | Age: 73
End: 2023-05-22

## 2023-05-22 NOTE — TELEPHONE ENCOUNTER
Pt called and lm stating that he is having trouble keeping his mask on pressure is too high.      Ph. [phone]

## 2023-05-23 NOTE — TELEPHONE ENCOUNTER
Spoke with pt to let him know that the pmin was decreased on his current unit until we he gets his bilevel. Pt states \"I got that Friday and have been using it\" Explained to pt that the unit needs to set up at ordered pressures and he will need to to contact his dme. Pt stated that they had called him but he has not returned the call yet. Pt was encouraged to call them to get the ordered settings on the unit and to have the modem activated. Pt states he was not aware of the process and will call today.

## 2023-05-23 NOTE — TELEPHONE ENCOUNTER
Called Jairon from [facility] and he is going to see that Floyd Maine gets his unit set up and mask fit done.

## 2023-05-23 NOTE — TELEPHONE ENCOUNTER
Pt called and stated that [facility] told him to call us to get the . Pt also stated that he needed the moisture increased and the pressure to be lowered.      Ph. [phone]

## 2023-05-31 ENCOUNTER — OFFICE VISIT (OUTPATIENT)
Dept: CARDIOLOGY CLINIC | Age: 73
End: 2023-05-31
Payer: MEDICARE

## 2023-05-31 VITALS
DIASTOLIC BLOOD PRESSURE: 70 MMHG | BODY MASS INDEX: 27.31 KG/M2 | HEART RATE: 88 BPM | WEIGHT: 207 LBS | SYSTOLIC BLOOD PRESSURE: 110 MMHG

## 2023-05-31 DIAGNOSIS — I48.20 CHRONIC ATRIAL FIBRILLATION (HCC): ICD-10-CM

## 2023-05-31 DIAGNOSIS — R00.2 PALPITATION: ICD-10-CM

## 2023-05-31 DIAGNOSIS — I50.32 CHRONIC DIASTOLIC CONGESTIVE HEART FAILURE (HCC): Primary | ICD-10-CM

## 2023-05-31 DIAGNOSIS — I34.0 NONRHEUMATIC MITRAL VALVE REGURGITATION: ICD-10-CM

## 2023-05-31 PROCEDURE — 3017F COLORECTAL CA SCREEN DOC REV: CPT | Performed by: INTERNAL MEDICINE

## 2023-05-31 PROCEDURE — 3078F DIAST BP <80 MM HG: CPT | Performed by: INTERNAL MEDICINE

## 2023-05-31 PROCEDURE — 1124F ACP DISCUSS-NO DSCNMKR DOCD: CPT | Performed by: INTERNAL MEDICINE

## 2023-05-31 PROCEDURE — 99214 OFFICE O/P EST MOD 30 MIN: CPT | Performed by: INTERNAL MEDICINE

## 2023-05-31 PROCEDURE — G8427 DOCREV CUR MEDS BY ELIG CLIN: HCPCS | Performed by: INTERNAL MEDICINE

## 2023-05-31 PROCEDURE — 3074F SYST BP LT 130 MM HG: CPT | Performed by: INTERNAL MEDICINE

## 2023-05-31 PROCEDURE — G8417 CALC BMI ABV UP PARAM F/U: HCPCS | Performed by: INTERNAL MEDICINE

## 2023-05-31 PROCEDURE — 1036F TOBACCO NON-USER: CPT | Performed by: INTERNAL MEDICINE

## 2023-05-31 NOTE — PROGRESS NOTES
Subjective:      Patient ID: Alley Mueller is a 67 y.o. male. SÁNCHEZ Salmon is here today for follow up afib/palp/mr. Needs urolift. CV stable. Back issues, but improving. Rhythm stable. No dizzy spells. No sob. Edema stable. No pnd or orthopnea. No syncope. Wt up some.        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 Mora Cobb - internal hemorrhoids , hyperplastic polyp     CYSTOSCOPY  1-11-10     Dr. Fabio Burton ureteral stent     LYMPH NODE BIOPSY  5/11/11    RIGHT CERVICAL    LYMPH NODE DISSECTION N/A 2011    TONSILLECTOMY      UPPER GASTROINTESTINAL ENDOSCOPY N/A 7/23/2022    EGD DIAGNOSTIC ONLY performed by Kirk Sun MD at [address] [place] N/A 9/21/2022    EGD BIOPSY performed by Shayne Polo MD at [facility] ENDOSCOPY         Allergies   Allergen Reactions    Gentamicin Other (See Comments)     Medication DOREEN: Gentamicin caused Ototoxicity in pt's father, so they avoid taking it at all costs (pt has never taken it) -- not a true allergy        Social History     Socioeconomic History    Marital status:      Spouse name: Not on file    Number of children: 0    Years of education: Not on file    Highest education level: Not on file   Occupational History    Occupation: retired   Tobacco Use    Smoking status: Never    Smokeless tobacco: Never   Vaping Use    Vaping Use: Never used   Substance and Sexual Activity    Alcohol use: No     Alcohol/week: 0.0 standard

## 2023-07-18 ENCOUNTER — TELEPHONE (OUTPATIENT)
Dept: PULMONOLOGY | Age: 73
End: 2023-07-18

## 2023-07-18 NOTE — TELEPHONE ENCOUNTER
Pt called in and lm asking to speak with [facility] regarding the pressure on machine.      [phone]

## 2023-07-18 NOTE — TELEPHONE ENCOUNTER
Spoke with pt to let him know the pressure was decreased. Pt to use  and if dryness remains will contact his DME.

## 2023-07-24 ENCOUNTER — TELEPHONE (OUTPATIENT)
Dept: PULMONOLOGY | Age: 73
End: 2023-07-24

## 2023-07-24 NOTE — TELEPHONE ENCOUNTER
Pt called in to verify his appointment date and time. Verified date and time. Also told pt that he needs machine. Pt verbalized understanding.

## 2023-09-06 ENCOUNTER — OFFICE VISIT (OUTPATIENT)
Dept: CARDIOLOGY CLINIC | Age: 73
End: 2023-09-06
Payer: MEDICARE

## 2023-09-06 VITALS
SYSTOLIC BLOOD PRESSURE: 160 MMHG | WEIGHT: 206 LBS | DIASTOLIC BLOOD PRESSURE: 90 MMHG | BODY MASS INDEX: 27.18 KG/M2 | HEART RATE: 70 BPM

## 2023-09-06 DIAGNOSIS — R00.2 PALPITATION: ICD-10-CM

## 2023-09-06 DIAGNOSIS — I50.32 CHRONIC DIASTOLIC CONGESTIVE HEART FAILURE (HCC): Primary | ICD-10-CM

## 2023-09-06 DIAGNOSIS — I48.20 CHRONIC ATRIAL FIBRILLATION (HCC): ICD-10-CM

## 2023-09-06 DIAGNOSIS — I34.0 NONRHEUMATIC MITRAL VALVE REGURGITATION: ICD-10-CM

## 2023-09-06 PROCEDURE — 1036F TOBACCO NON-USER: CPT | Performed by: INTERNAL MEDICINE

## 2023-09-06 PROCEDURE — 3077F SYST BP >= 140 MM HG: CPT | Performed by: INTERNAL MEDICINE

## 2023-09-06 PROCEDURE — 3080F DIAST BP >= 90 MM HG: CPT | Performed by: INTERNAL MEDICINE

## 2023-09-06 PROCEDURE — 99214 OFFICE O/P EST MOD 30 MIN: CPT | Performed by: INTERNAL MEDICINE

## 2023-09-06 PROCEDURE — G8417 CALC BMI ABV UP PARAM F/U: HCPCS | Performed by: INTERNAL MEDICINE

## 2023-09-06 PROCEDURE — 3017F COLORECTAL CA SCREEN DOC REV: CPT | Performed by: INTERNAL MEDICINE

## 2023-09-06 PROCEDURE — G8427 DOCREV CUR MEDS BY ELIG CLIN: HCPCS | Performed by: INTERNAL MEDICINE

## 2023-09-06 PROCEDURE — 1124F ACP DISCUSS-NO DSCNMKR DOCD: CPT | Performed by: INTERNAL MEDICINE

## 2023-09-06 NOTE — PROGRESS NOTES
Subjective:      Patient ID: Brent Sterling is a 75 y.o. male. HPI Leah Espinoza is here today for follow up afib/palp/[place] Needs urolift. CV stable. Back issues, but improving. Rhythm stable. No dizzy spells. No sob. Edema stable. No pnd or orthopnea. No syncope. Wt up some.        Past Medical History:   Diagnosis Date    AF (atrial fibrillation) (HCC)     Anticoagulated     Aortic valve disease     leaking no problems    CKD (chronic kidney disease) stage 3, GFR 30-59 ml/min (HCC)     GERD (gastroesophageal reflux disease)     History of DVT (deep vein thrombosis)     HTN (hypertension)     Lymphoma ([address]) 2015    B cell    Nephrolithiasis     VERA (obstructive sleep apnea)     Pure hypercholesterolemia      Past Surgical History:   Procedure Laterality Date    APPENDECTOMY      COLONOSCOPY  8/18/2006     Dr. Juliana Howell - internal hemorrhoids , hyperplastic polyp     CYSTOSCOPY  1-11-10     Dr. Rosanna Troncoso ureteral stent     LYMPH NODE BIOPSY  5/11/11    RIGHT CERVICAL    LYMPH NODE DISSECTION N/A 2011    TONSILLECTOMY      UPPER GASTROINTESTINAL ENDOSCOPY N/A 7/23/2022    EGD DIAGNOSTIC ONLY performed by Vy Guzman MD at [address] [place] [address] N/A 9/21/2022    EGD BIOPSY performed by Sharon Bryant MD at [facility] ENDOSCOPY         Allergies   Allergen Reactions    Gentamicin Other (See Comments)     Medication DOREEN: Gentamicin caused Ototoxicity in pt's father, so they avoid taking it at all costs (pt has never taken it) -- not a true allergy        Social History     Socioeconomic History    Marital status:      Spouse name: Not on file    Number of children: 0    Years of education: Not on file    Highest education level: Not on file   Occupational History    Occupation: retired   Tobacco Use    Smoking status: Never    Smokeless tobacco: Never   Vaping Use    Vaping Use: Never used   Substance and Sexual Activity    Alcohol use: No     Alcohol/week: 0.0 standard

## 2023-10-10 ENCOUNTER — TELEPHONE (OUTPATIENT)
Dept: CARDIOLOGY CLINIC | Age: 73
End: 2023-10-10

## 2023-10-10 ENCOUNTER — NURSE ONLY (OUTPATIENT)
Dept: CARDIOLOGY CLINIC | Age: 73
End: 2023-10-10
Payer: MEDICARE

## 2023-10-10 DIAGNOSIS — I48.20 CHRONIC ATRIAL FIBRILLATION (HCC): Primary | Chronic | ICD-10-CM

## 2023-10-10 PROCEDURE — 93000 ELECTROCARDIOGRAM COMPLETE: CPT | Performed by: INTERNAL MEDICINE

## 2023-10-13 ENCOUNTER — TELEPHONE (OUTPATIENT)
Dept: CARDIOLOGY CLINIC | Age: 73
End: 2023-10-13

## 2023-10-13 NOTE — TELEPHONE ENCOUNTER
Pt came over to report his blood pressure was 170/??. Olga Avani took it at 142/80. I took twice at 156/86 and 158/86.  Please advise pt [phone]

## 2023-10-16 NOTE — TELEPHONE ENCOUNTER
The patient called back regarding the previous messages. The patient states his top number for his B/P is still running between the 150's and 170\"s. Please call the patient back as soon as possible to advise.  [phone]

## 2023-10-18 ENCOUNTER — OFFICE VISIT (OUTPATIENT)
Dept: CARDIOLOGY CLINIC | Age: 73
End: 2023-10-18
Payer: MEDICARE

## 2023-10-18 VITALS
HEART RATE: 64 BPM | BODY MASS INDEX: 27.31 KG/M2 | SYSTOLIC BLOOD PRESSURE: 158 MMHG | WEIGHT: 207 LBS | DIASTOLIC BLOOD PRESSURE: 80 MMHG

## 2023-10-18 DIAGNOSIS — I10 ESSENTIAL HYPERTENSION: Primary | ICD-10-CM

## 2023-10-18 DIAGNOSIS — I82.403 DEEP VEIN THROMBOSIS (DVT) OF BOTH LOWER EXTREMITIES, UNSPECIFIED CHRONICITY, UNSPECIFIED VEIN (HCC): ICD-10-CM

## 2023-10-18 PROCEDURE — 1036F TOBACCO NON-USER: CPT | Performed by: NURSE PRACTITIONER

## 2023-10-18 PROCEDURE — 3077F SYST BP >= 140 MM HG: CPT | Performed by: NURSE PRACTITIONER

## 2023-10-18 PROCEDURE — G8484 FLU IMMUNIZE NO ADMIN: HCPCS | Performed by: NURSE PRACTITIONER

## 2023-10-18 PROCEDURE — 1124F ACP DISCUSS-NO DSCNMKR DOCD: CPT | Performed by: NURSE PRACTITIONER

## 2023-10-18 PROCEDURE — G8417 CALC BMI ABV UP PARAM F/U: HCPCS | Performed by: NURSE PRACTITIONER

## 2023-10-18 PROCEDURE — 3017F COLORECTAL CA SCREEN DOC REV: CPT | Performed by: NURSE PRACTITIONER

## 2023-10-18 PROCEDURE — G8427 DOCREV CUR MEDS BY ELIG CLIN: HCPCS | Performed by: NURSE PRACTITIONER

## 2023-10-18 PROCEDURE — 99214 OFFICE O/P EST MOD 30 MIN: CPT | Performed by: NURSE PRACTITIONER

## 2023-10-18 PROCEDURE — 3079F DIAST BP 80-89 MM HG: CPT | Performed by: NURSE PRACTITIONER

## 2023-10-18 RX ORDER — HYDRALAZINE HYDROCHLORIDE 25 MG/1
25 TABLET, FILM COATED ORAL 2 TIMES DAILY
Qty: 90 TABLET | Refills: 3 | Status: SHIPPED | OUTPATIENT
Start: 2023-10-18 | End: 2023-10-18

## 2023-10-18 RX ORDER — HYDRALAZINE HYDROCHLORIDE 50 MG/1
50 TABLET, FILM COATED ORAL 2 TIMES DAILY
Qty: 60 TABLET | Refills: 3 | Status: SHIPPED | OUTPATIENT
Start: 2023-10-18

## 2023-10-18 NOTE — PROGRESS NOTES
[facility]     Outpatient Follow Up Note  Pt of Dr Stas Marinelli RN, APRN,CNP Phi Erwin / HPI: Alaina Godfrey is 75 y.o. male who presents today with   dHF,  persistent afib DVT,on Eliquis no noted bleeding or falls recommend only Tylenol no NSAIDs  VERA uses Bi PAP  non [place] lymphoma there   today c/o having b/p elevation for approx 2 weeks, wax and wanes, no h/a no c/o cp SOB edema, exercises routinely     He is on coreg 6.25 mg bid, he doesn't want to increase his coreg  mild lower leg pitting edema / recommend low salt diet / CLINT hose  He doesn't want any type of diuretic   sCr 1.43 in 5/31/2023 / will not add ace/arb   Add hydralazine 50 mg BID   f/u in 2-3 weeks to check his blood pressure       I spent a total of 50 minutes and greater than 50% of the time was spent counseling with patient  coordinating care regarding her diagnosis, reviewing labs, cardiac work up, treatments and plan of care. Past Medical History:   Diagnosis Date    AF (atrial fibrillation) (HCC)     Anticoagulated     Aortic valve disease     leaking no problems    CKD (chronic kidney disease) stage 3, GFR 30-59 ml/min ([facility])     GERD (gastroesophageal reflux disease)     History of DVT (deep vein thrombosis)     HTN (hypertension)     Lymphoma ([address]) 2015    B cell    Nephrolithiasis     VERA (obstructive sleep apnea)     Pure hypercholesterolemia      Current Outpatient Medications   Medication Sig Dispense Refill    ELIQUIS 5 MG TABS tablet TAKE ONE TABLET BY MOUTH TWICE A  tablet 3    tamsulosin (FLOMAX) 0.4 MG capsule Take 1 capsule by mouth daily 30 capsule 3    carvedilol (COREG) 6.25 MG tablet Take 0.5 tablets by mouth 2 times daily (with meals) (Patient taking differently: Take 1 tablet by mouth 2 times daily (with meals)) 60 tablet 3    famotidine (PEPCID) 40 MG tablet Take 1 tablet by mouth at bedtime       No current facility-administered medications for this visit.

## 2023-11-08 ENCOUNTER — OFFICE VISIT (OUTPATIENT)
Dept: CARDIOLOGY CLINIC | Age: 73
End: 2023-11-08
Payer: MEDICARE

## 2023-11-08 VITALS
WEIGHT: 207 LBS | HEART RATE: 69 BPM | BODY MASS INDEX: 27.32 KG/M2 | DIASTOLIC BLOOD PRESSURE: 90 MMHG | SYSTOLIC BLOOD PRESSURE: 160 MMHG

## 2023-11-08 DIAGNOSIS — I34.0 NONRHEUMATIC MITRAL VALVE REGURGITATION: Primary | ICD-10-CM

## 2023-11-08 DIAGNOSIS — E78.00 PURE HYPERCHOLESTEROLEMIA: Chronic | ICD-10-CM

## 2023-11-08 DIAGNOSIS — I82.412 ACUTE DEEP VEIN THROMBOSIS (DVT) OF FEMORAL VEIN OF LEFT LOWER EXTREMITY (HCC): ICD-10-CM

## 2023-11-08 PROCEDURE — 3080F DIAST BP >= 90 MM HG: CPT | Performed by: NURSE PRACTITIONER

## 2023-11-08 PROCEDURE — 1036F TOBACCO NON-USER: CPT | Performed by: NURSE PRACTITIONER

## 2023-11-08 PROCEDURE — G8427 DOCREV CUR MEDS BY ELIG CLIN: HCPCS | Performed by: NURSE PRACTITIONER

## 2023-11-08 PROCEDURE — G8417 CALC BMI ABV UP PARAM F/U: HCPCS | Performed by: NURSE PRACTITIONER

## 2023-11-08 PROCEDURE — G8484 FLU IMMUNIZE NO ADMIN: HCPCS | Performed by: NURSE PRACTITIONER

## 2023-11-08 PROCEDURE — 3077F SYST BP >= 140 MM HG: CPT | Performed by: NURSE PRACTITIONER

## 2023-11-08 PROCEDURE — 99214 OFFICE O/P EST MOD 30 MIN: CPT | Performed by: NURSE PRACTITIONER

## 2023-11-08 PROCEDURE — 3017F COLORECTAL CA SCREEN DOC REV: CPT | Performed by: NURSE PRACTITIONER

## 2023-11-08 PROCEDURE — 1124F ACP DISCUSS-NO DSCNMKR DOCD: CPT | Performed by: NURSE PRACTITIONER

## 2023-11-08 RX ORDER — CARVEDILOL 6.25 MG/1
6.25 TABLET ORAL 2 TIMES DAILY
Qty: 60 TABLET | Refills: 3 | Status: SHIPPED | OUTPATIENT
Start: 2023-11-08

## 2023-11-08 NOTE — PROGRESS NOTES
[facility]     Outpatient Follow Up Note  Pt of Dr Maegan Krause RN, APRN,CNP Indira Andrews / HPI: Arabella Adler is 75 y.o. male who presents today with   dHF,  asymptomatic persistent afib DVT,on Eliquis no noted bleeding or falls recommend only Tylenol no NSAIDs  VERA uses Bi PAP  non [place] lymphoma there   today c/o having b/p elevation for approx 2 weeks, wax and wanes, no h/a no c/o cp SOB edema, exercises routinely     He is on coreg 6.25 mg bid, he doesn't want to increase his coreg or add new meds after long discussion / I recommend to do 12.5 mg coreg BID   recommend low salt diet / CLINT hose  He doesn't want any type of diuretic   sCr 1.43 / will not add ace/arb   Added  hydralazine 50 mg BID  last visit    b/p better but not optimal   f/u  with Dr Maegan Montaño as planned        11/10/2022 TTE Left ventricular cavity size is mildly dilated. Normal left ventricular wall thickness. Ejection fraction is visually estimated to be 50-55%. No regional wall motion abnormalities are noted. Indeterminate diastolic function. Calcification of the mitral valve noted with a focal calcification on the mitral leaflet. Mild eccentric mitral regurgitation. No evidence of mitral stenosis. The aortic valve appears sclerotic but opens well. Trace aortic regurgitation is present. No evidence of Aortic Valve Stenosis. Normal right ventricular size and function. IVC size is dilated (>2.1 cm) but collapses > 50% with respiration consistent with elevated RA pressure (8 mmHg). Pulmonary hypertension. RVSP is estimated at 50 mmHg    I spent a total of 50 minutes and greater than 50% of the time was spent counseling with patient  coordinating care regarding her diagnosis, reviewing labs, cardiac work up, treatments and plan of care.     Past Medical History:   Diagnosis Date    AF (atrial fibrillation) (HCC)     Anticoagulated     Aortic valve disease     leaking no problems    CKD (chronic kidney

## 2023-12-06 DIAGNOSIS — E78.00 PURE HYPERCHOLESTEROLEMIA: Chronic | ICD-10-CM

## 2023-12-06 DIAGNOSIS — I34.0 NONRHEUMATIC MITRAL VALVE REGURGITATION: ICD-10-CM

## 2023-12-06 LAB
25(OH)D3 SERPL-MCNC: 40.6 NG/ML
ALBUMIN SERPL-MCNC: 4.3 G/DL (ref 3.4–5)
ALBUMIN/GLOB SERPL: 1.3 {RATIO} (ref 1.1–2.2)
ALP SERPL-CCNC: 81 U/L (ref 40–129)
ALT SERPL-CCNC: 20 U/L (ref 10–40)
ANION GAP SERPL CALCULATED.3IONS-SCNC: 11 MMOL/L (ref 3–16)
AST SERPL-CCNC: 20 U/L (ref 15–37)
BILIRUB DIRECT SERPL-MCNC: <0.2 MG/DL (ref 0–0.3)
BILIRUB INDIRECT SERPL-MCNC: NORMAL MG/DL (ref 0–1)
BILIRUB SERPL-MCNC: 0.7 MG/DL (ref 0–1)
BUN SERPL-MCNC: 31 MG/DL (ref 7–20)
CALCIUM SERPL-MCNC: 9.8 MG/DL (ref 8.3–10.6)
CHLORIDE SERPL-SCNC: 100 MMOL/L (ref 99–110)
CHOLEST SERPL-MCNC: 150 MG/DL (ref 0–199)
CO2 SERPL-SCNC: 28 MMOL/L (ref 21–32)
CREAT SERPL-MCNC: 1.3 MG/DL (ref 0.8–1.3)
DEPRECATED RDW RBC AUTO: 14.4 % (ref 12.4–15.4)
GFR SERPLBLD CREATININE-BSD FMLA CKD-EPI: 57 ML/MIN/{1.73_M2}
GLUCOSE SERPL-MCNC: 78 MG/DL (ref 70–99)
HCT VFR BLD AUTO: 49.2 % (ref 40.5–52.5)
HDLC SERPL-MCNC: 39 MG/DL (ref 40–60)
HGB BLD-MCNC: 16.5 G/DL (ref 13.5–17.5)
LDLC SERPL CALC-MCNC: 99 MG/DL
MAGNESIUM SERPL-MCNC: 2 MG/DL (ref 1.8–2.4)
MCH RBC QN AUTO: 31.9 PG (ref 26–34)
MCHC RBC AUTO-ENTMCNC: 33.5 G/DL (ref 31–36)
MCV RBC AUTO: 95 FL (ref 80–100)
PLATELET # BLD AUTO: 165 K/UL (ref 135–450)
PMV BLD AUTO: 8.8 FL (ref 5–10.5)
POTASSIUM SERPL-SCNC: 4.6 MMOL/L (ref 3.5–5.1)
PROT SERPL-MCNC: 7.5 G/DL (ref 6.4–8.2)
RBC # BLD AUTO: 5.18 M/UL (ref 4.2–5.9)
SODIUM SERPL-SCNC: 139 MMOL/L (ref 136–145)
TRIGL SERPL-MCNC: 61 MG/DL (ref 0–150)
TSH SERPL DL<=0.005 MIU/L-ACNC: 2.37 UIU/ML (ref 0.27–4.2)
VLDLC SERPL CALC-MCNC: 12 MG/DL
WBC # BLD AUTO: 6.4 K/UL (ref 4–11)

## 2023-12-07 LAB
EST. AVERAGE GLUCOSE BLD GHB EST-MCNC: 108.3 MG/DL
HBA1C MFR BLD: 5.4 %

## 2023-12-12 ENCOUNTER — OFFICE VISIT (OUTPATIENT)
Dept: CARDIOLOGY CLINIC | Age: 73
End: 2023-12-12
Payer: MEDICARE

## 2023-12-12 VITALS
SYSTOLIC BLOOD PRESSURE: 134 MMHG | BODY MASS INDEX: 27.32 KG/M2 | DIASTOLIC BLOOD PRESSURE: 80 MMHG | WEIGHT: 207 LBS | HEART RATE: 60 BPM

## 2023-12-12 DIAGNOSIS — I34.0 NONRHEUMATIC MITRAL VALVE REGURGITATION: ICD-10-CM

## 2023-12-12 DIAGNOSIS — I48.20 CHRONIC ATRIAL FIBRILLATION (HCC): ICD-10-CM

## 2023-12-12 DIAGNOSIS — I50.32 CHRONIC DIASTOLIC CONGESTIVE HEART FAILURE (HCC): Primary | ICD-10-CM

## 2023-12-12 DIAGNOSIS — R00.2 PALPITATION: ICD-10-CM

## 2023-12-12 PROCEDURE — G8428 CUR MEDS NOT DOCUMENT: HCPCS | Performed by: INTERNAL MEDICINE

## 2023-12-12 PROCEDURE — 99214 OFFICE O/P EST MOD 30 MIN: CPT | Performed by: INTERNAL MEDICINE

## 2023-12-12 PROCEDURE — 1036F TOBACCO NON-USER: CPT | Performed by: INTERNAL MEDICINE

## 2023-12-12 PROCEDURE — 3079F DIAST BP 80-89 MM HG: CPT | Performed by: INTERNAL MEDICINE

## 2023-12-12 PROCEDURE — 3075F SYST BP GE 130 - 139MM HG: CPT | Performed by: INTERNAL MEDICINE

## 2023-12-12 PROCEDURE — G8417 CALC BMI ABV UP PARAM F/U: HCPCS | Performed by: INTERNAL MEDICINE

## 2023-12-12 PROCEDURE — G8484 FLU IMMUNIZE NO ADMIN: HCPCS | Performed by: INTERNAL MEDICINE

## 2023-12-12 PROCEDURE — 1124F ACP DISCUSS-NO DSCNMKR DOCD: CPT | Performed by: INTERNAL MEDICINE

## 2023-12-12 PROCEDURE — 3017F COLORECTAL CA SCREEN DOC REV: CPT | Performed by: INTERNAL MEDICINE

## 2023-12-12 NOTE — PROGRESS NOTES
Subjective:      Patient ID: Mitchell Olivo is a 75 y.o. male. HPI Stuart Barbour is here today for follow up afib/palp/mr. CV stable. Back issues, but improving. Rhythm stable. No dizzy spells. No sob. Edema stable. No pnd or orthopnea. No syncope. Wt stable. .       Past Medical History:   Diagnosis Date    AF (atrial fibrillation) (HCC)     Anticoagulated     Aortic valve disease     leaking no problems    CKD (chronic kidney disease) stage 3, GFR 30-59 ml/min (HCC)     GERD (gastroesophageal reflux disease)     History of DVT (deep vein thrombosis)     HTN (hypertension)     Lymphoma ([facility]) 2015    B cell    Nephrolithiasis     VERA (obstructive sleep apnea)     Pure hypercholesterolemia      Past Surgical History:   Procedure Laterality Date    APPENDECTOMY      COLONOSCOPY  8/18/2006     Dr. Jaziel Salazar - internal hemorrhoids , hyperplastic polyp     CYSTOSCOPY  1-11-10     Dr. Sunil Mederos ureteral stent     LYMPH NODE BIOPSY  5/11/11    RIGHT CERVICAL    LYMPH NODE DISSECTION N/A 2011    TONSILLECTOMY      UPPER GASTROINTESTINAL ENDOSCOPY N/A 7/23/2022    EGD DIAGNOSTIC ONLY performed by Real Langford MD at [facility] N/A 9/21/2022    EGD BIOPSY performed by Katt Ellis MD at [address] [place] [address]   Allergen Reactions    Dextromethorphan Other (See Comments)     Urinary retention     Norvasc [Amlodipine]      C/o to much edema Iower extremities     Gentamicin Other (See Comments)     Medication DOREEN: Gentamicin caused Ototoxicity in pt's father, so they avoid taking it at all costs (pt has never taken it) -- not a true allergy        Social History     Socioeconomic History    Marital status:      Spouse name: Not on file    Number of children: 0    Years of education: Not on file    Highest education level: Not on file   Occupational History    Occupation: retired   Tobacco Use    Smoking status: Never    Smokeless tobacco: Never   Vaping

## 2024-01-19 LAB — URATE SERPL-MCNC: 7.5 MG/DL (ref 3.5–7.2)

## 2024-02-01 ENCOUNTER — TELEPHONE (OUTPATIENT)
Dept: CARDIOLOGY CLINIC | Age: 74
End: 2024-02-01

## 2024-02-01 NOTE — TELEPHONE ENCOUNTER
Requested Prescriptions     Pending Prescriptions Disp Refills    carvedilol (COREG) 6.25 MG tablet 180 tablet 3     Sig: Take 1 tablet by mouth 2 times daily          Number: 180    Refills: 3    Last Office Visit: 12/12/2023     Next Office Visit: 3/21/2024

## 2024-02-02 RX ORDER — CARVEDILOL 6.25 MG/1
6.25 TABLET ORAL 2 TIMES DAILY
Qty: 180 TABLET | Refills: 3 | Status: SHIPPED | OUTPATIENT
Start: 2024-02-02

## 2024-02-13 RX ORDER — HYDRALAZINE HYDROCHLORIDE 50 MG/1
TABLET, FILM COATED ORAL
Qty: 60 TABLET | Refills: 3 | Status: SHIPPED | OUTPATIENT
Start: 2024-02-13

## 2024-03-21 ENCOUNTER — OFFICE VISIT (OUTPATIENT)
Dept: CARDIOLOGY CLINIC | Age: 74
End: 2024-03-21
Payer: MEDICARE

## 2024-03-21 VITALS
WEIGHT: 203 LBS | DIASTOLIC BLOOD PRESSURE: 80 MMHG | HEART RATE: 76 BPM | BODY MASS INDEX: 27.53 KG/M2 | SYSTOLIC BLOOD PRESSURE: 180 MMHG

## 2024-03-21 DIAGNOSIS — R00.2 PALPITATION: ICD-10-CM

## 2024-03-21 DIAGNOSIS — I34.0 NONRHEUMATIC MITRAL VALVE REGURGITATION: ICD-10-CM

## 2024-03-21 DIAGNOSIS — I50.32 CHRONIC DIASTOLIC CONGESTIVE HEART FAILURE (HCC): Primary | ICD-10-CM

## 2024-03-21 DIAGNOSIS — I48.20 CHRONIC ATRIAL FIBRILLATION (HCC): ICD-10-CM

## 2024-03-21 PROCEDURE — G8417 CALC BMI ABV UP PARAM F/U: HCPCS | Performed by: INTERNAL MEDICINE

## 2024-03-21 PROCEDURE — 3077F SYST BP >= 140 MM HG: CPT | Performed by: INTERNAL MEDICINE

## 2024-03-21 PROCEDURE — G8484 FLU IMMUNIZE NO ADMIN: HCPCS | Performed by: INTERNAL MEDICINE

## 2024-03-21 PROCEDURE — 99214 OFFICE O/P EST MOD 30 MIN: CPT | Performed by: INTERNAL MEDICINE

## 2024-03-21 PROCEDURE — 1036F TOBACCO NON-USER: CPT | Performed by: INTERNAL MEDICINE

## 2024-03-21 PROCEDURE — 3017F COLORECTAL CA SCREEN DOC REV: CPT | Performed by: INTERNAL MEDICINE

## 2024-03-21 PROCEDURE — 1124F ACP DISCUSS-NO DSCNMKR DOCD: CPT | Performed by: INTERNAL MEDICINE

## 2024-03-21 PROCEDURE — 3079F DIAST BP 80-89 MM HG: CPT | Performed by: INTERNAL MEDICINE

## 2024-03-21 PROCEDURE — G8428 CUR MEDS NOT DOCUMENT: HCPCS | Performed by: INTERNAL MEDICINE

## 2024-05-14 RX ORDER — APIXABAN 5 MG/1
5 TABLET, FILM COATED ORAL 2 TIMES DAILY
Qty: 90 TABLET | Refills: 2 | Status: SHIPPED | OUTPATIENT
Start: 2024-05-14

## 2024-05-21 ENCOUNTER — TELEPHONE (OUTPATIENT)
Dept: SLEEP CENTER | Age: 74
End: 2024-05-21

## 2024-05-21 NOTE — TELEPHONE ENCOUNTER
Called to schedule a BiPAP per Reddy     Spoke with pt-he's calling back in a couple days when he has his calendar available.   Wants [employer]

## 2024-06-18 NOTE — TELEPHONE ENCOUNTER
Patient stopped in wanting a refill on hydrALAZINE (APRESOLINE) 50 MG [facility]. Patient states he only has four left and would like a call when the prescription has been sent. Last OV 3.21.24 next OC 6.19.24

## 2024-06-18 NOTE — TELEPHONE ENCOUNTER
Requested Prescriptions     Pending Prescriptions Disp Refills    hydrALAZINE (APRESOLINE) 50 MG tablet 60 tablet 3     Sig: TAKE ONE TABLET BY MOUTH EVERY MORNING AND TAKE ONE TABLET BY MOUTH EVERY NIGHT AT BEDTIME            Checked Correct Pharmacy: Yes    Any changes since last refill? No     Number: 60    Refills: 5    Last Office Visit: 3/21/2024     Next Office Visit: 6/19/2024     Last Labs: 12/06/2023

## 2024-06-19 ENCOUNTER — OFFICE VISIT (OUTPATIENT)
Dept: CARDIOLOGY CLINIC | Age: 74
End: 2024-06-19
Payer: MEDICARE

## 2024-06-19 VITALS
HEART RATE: 74 BPM | DIASTOLIC BLOOD PRESSURE: 70 MMHG | SYSTOLIC BLOOD PRESSURE: 126 MMHG | BODY MASS INDEX: 27.53 KG/M2 | WEIGHT: 203 LBS

## 2024-06-19 DIAGNOSIS — I34.0 NONRHEUMATIC MITRAL VALVE REGURGITATION: ICD-10-CM

## 2024-06-19 DIAGNOSIS — I50.32 CHRONIC DIASTOLIC CONGESTIVE HEART FAILURE (HCC): Primary | ICD-10-CM

## 2024-06-19 DIAGNOSIS — I48.20 CHRONIC ATRIAL FIBRILLATION (HCC): ICD-10-CM

## 2024-06-19 DIAGNOSIS — R00.2 PALPITATION: ICD-10-CM

## 2024-06-19 PROCEDURE — 3017F COLORECTAL CA SCREEN DOC REV: CPT | Performed by: INTERNAL MEDICINE

## 2024-06-19 PROCEDURE — 3074F SYST BP LT 130 MM HG: CPT | Performed by: INTERNAL MEDICINE

## 2024-06-19 PROCEDURE — 99214 OFFICE O/P EST MOD 30 MIN: CPT | Performed by: INTERNAL MEDICINE

## 2024-06-19 PROCEDURE — 3078F DIAST BP <80 MM HG: CPT | Performed by: INTERNAL MEDICINE

## 2024-06-19 PROCEDURE — G8428 CUR MEDS NOT DOCUMENT: HCPCS | Performed by: INTERNAL MEDICINE

## 2024-06-19 PROCEDURE — 1124F ACP DISCUSS-NO DSCNMKR DOCD: CPT | Performed by: INTERNAL MEDICINE

## 2024-06-19 PROCEDURE — 1036F TOBACCO NON-USER: CPT | Performed by: INTERNAL MEDICINE

## 2024-06-19 PROCEDURE — G8417 CALC BMI ABV UP PARAM F/U: HCPCS | Performed by: INTERNAL MEDICINE

## 2024-06-19 RX ORDER — HYDRALAZINE HYDROCHLORIDE 50 MG/1
TABLET, FILM COATED ORAL
Qty: 180 TABLET | Refills: 2 | Status: SHIPPED | OUTPATIENT
Start: 2024-06-19

## 2024-06-19 NOTE — PROGRESS NOTES
Smokeless tobacco: Never   Vaping Use    Vaping Use: Never used   Substance and Sexual Activity    Alcohol use: No     Alcohol/week: 0.0 standard drinks of alcohol    Drug use: No    Sexual activity: Yes     Partners: Female   Other Topics Concern    Not on file   Social History Narrative    Not on file     Social Determinants of Health     Financial Resource Strain: Not on file   Food Insecurity: Not on file   Transportation Needs: Not on file   Physical Activity: Not on file   Stress: Not on file   Social Connections: Not on file   Intimate Partner Violence: Not on file   Housing Stability: Not on file        Patient has a family history includes Heart Disease in his mother.    Patient  has a past medical history of AF (atrial fibrillation) ([facility]), Anticoagulated, Aortic valve disease, CKD (chronic kidney disease) stage 3, GFR 30-59 ml/min ([facility]), GERD (gastroesophageal reflux disease), History of DVT (deep vein thrombosis), HTN (hypertension), Lymphoma ([facility]), Nephrolithiasis, VERA (obstructive sleep apnea), and Pure hypercholesterolemia.     Current Outpatient Medications   Medication Sig Dispense Refill    hydrALAZINE (APRESOLINE) 50 MG tablet TAKE ONE TABLET BY MOUTH EVERY MORNING AND TAKE ONE TABLET BY MOUTH EVERY NIGHT AT BEDTIME 180 tablet 2    ELIQUIS 5 MG TABS tablet TAKE 1 TABLET BY MOUTH TWICE A DAY 90 tablet 2    carvedilol (COREG) 6.25 MG tablet Take 1 tablet by mouth 2 times daily 180 tablet 3    tamsulosin (FLOMAX) 0.4 MG capsule Take 1 capsule by mouth daily 30 capsule 3    famotidine (PEPCID) 40 MG tablet Take 1 tablet by mouth at bedtime       No current facility-administered medications for this visit.        Vitals:    06/19/24 1258   BP: 126/70   Pulse: 74       Wt 206    Review of Systems   Constitutional: Negative for activity change and fatigue.   Respiratory: Negative for apnea, cough, choking, chest tightness and shortness of breath.    Cardiovascular: Negative for chest pain, palpitations

## 2024-06-20 NOTE — TELEPHONE ENCOUNTER
Requested Prescriptions     Pending Prescriptions Disp Refills    amLODIPine (NORVASC) 5 MG tablet [Pharmacy Med Name: amLODIPine BESYLATE 5 MG TAB] 30 tablet 3     Sig: TAKE ONE TABLET BY MOUTH DAILY          Number: 30    Refills: 3    Last Office Visit: 7/27/2022     Next Office Visit: 11/29/2022     Last Refill: 6/1/22    Last Labs: 7/22/22-7/24/22
show

## 2024-07-05 ENCOUNTER — HOSPITAL ENCOUNTER (OUTPATIENT)
Dept: SLEEP CENTER | Age: 74
Discharge: HOME | End: 2024-07-05

## 2024-07-10 ENCOUNTER — TELEPHONE (OUTPATIENT)
Dept: PULMONOLOGY | Age: 74
End: 2024-07-10

## 2024-07-10 NOTE — TELEPHONE ENCOUNTER
Spoke with pt to review titration results.. Pressure changes were made via modem. Pt to schedule a f/u.

## 2024-07-19 ENCOUNTER — TELEPHONE (OUTPATIENT)
Dept: CARDIOLOGY CLINIC | Age: 74
End: 2024-07-19

## 2024-07-19 NOTE — TELEPHONE ENCOUNTER
Pt would like to speak only with Francesca about medications. Refills and possible kidney indications.    [phone]

## 2024-07-23 DIAGNOSIS — I50.32 CHRONIC DIASTOLIC CONGESTIVE HEART FAILURE (HCC): Primary | ICD-10-CM

## 2024-07-23 NOTE — TELEPHONE ENCOUNTER
Spoke with patient, asked if any final decisions have been made with Dr. Laguna moving to another office/market.  Nothing as yet.  Also requested to have labs ordered to check renal function and CBC.  Orders placed.

## 2024-07-29 LAB
DEPRECATED RDW RBC AUTO: 14.5 % (ref 12.4–15.4)
HCT VFR BLD AUTO: 51.8 % (ref 40.5–52.5)
HGB BLD-MCNC: 17.7 G/DL (ref 13.5–17.5)
MCH RBC QN AUTO: 32.5 PG (ref 26–34)
MCHC RBC AUTO-ENTMCNC: 34 G/DL (ref 31–36)
MCV RBC AUTO: 95.6 FL (ref 80–100)
PLATELET # BLD AUTO: 168 K/UL (ref 135–450)
PMV BLD AUTO: 8.5 FL (ref 5–10.5)
RBC # BLD AUTO: 5.43 M/UL (ref 4.2–5.9)
WBC # BLD AUTO: 5.2 K/UL (ref 4–11)

## 2024-07-30 LAB
ALBUMIN SERPL-MCNC: 4.3 G/DL (ref 3.4–5)
ALBUMIN/GLOB SERPL: 1.2 {RATIO} (ref 1.1–2.2)
ALP SERPL-CCNC: 69 U/L (ref 40–129)
ALT SERPL-CCNC: 16 U/L (ref 10–40)
ANION GAP SERPL CALCULATED.3IONS-SCNC: 13 MMOL/L (ref 3–16)
AST SERPL-CCNC: 21 U/L (ref 15–37)
BILIRUB SERPL-MCNC: 1.2 MG/DL (ref 0–1)
BUN SERPL-MCNC: 20 MG/DL (ref 7–20)
CALCIUM SERPL-MCNC: 10 MG/DL (ref 8.3–10.6)
CHLORIDE SERPL-SCNC: 98 MMOL/L (ref 99–110)
CO2 SERPL-SCNC: 28 MMOL/L (ref 21–32)
CREAT SERPL-MCNC: 1.2 MG/DL (ref 0.8–1.3)
GFR SERPLBLD CREATININE-BSD FMLA CKD-EPI: 63 ML/MIN/{1.73_M2}
GLUCOSE SERPL-MCNC: 120 MG/DL (ref 70–99)
POTASSIUM SERPL-SCNC: 4.6 MMOL/L (ref 3.5–5.1)
PROT SERPL-MCNC: 7.8 G/DL (ref 6.4–8.2)
SODIUM SERPL-SCNC: 139 MMOL/L (ref 136–145)

## 2024-09-19 PROBLEM — D45 PV (POLYCYTHEMIA VERA) (HCC): Chronic | Status: ACTIVE | Noted: 2017-08-30

## 2025-01-23 ENCOUNTER — OFFICE VISIT (OUTPATIENT)
Dept: URGENT CARE | Age: 75
End: 2025-01-23

## 2025-01-23 VITALS
TEMPERATURE: 97 F | WEIGHT: 202 LBS | SYSTOLIC BLOOD PRESSURE: 128 MMHG | HEART RATE: 76 BPM | BODY MASS INDEX: 27.36 KG/M2 | DIASTOLIC BLOOD PRESSURE: 82 MMHG | HEIGHT: 72 IN | OXYGEN SATURATION: 95 %

## 2025-01-23 DIAGNOSIS — M70.31 BURSITIS OF RIGHT ELBOW, UNSPECIFIED BURSA: ICD-10-CM

## 2025-01-23 DIAGNOSIS — M25.421 SWELLING OF RIGHT ELBOW: Primary | ICD-10-CM

## 2025-01-23 ASSESSMENT — ENCOUNTER SYMPTOMS
NAUSEA: 0
EYE PAIN: 0
DIARRHEA: 0
VOMITING: 0
SHORTNESS OF BREATH: 0
ABDOMINAL PAIN: 0

## 2025-01-23 NOTE — PROGRESS NOTES
Parveen Caballero (: 1950) is a 74 y.o. male, New patient, here for evaluation of the following chief complaint(s):  Joint Swelling (2 days. Swollen. Pt states had an injury a year ago. )      ASSESSMENT/PLAN:    ICD-10-CM    1. Swelling of right elbow  M25.421 XR ELBOW RIGHT (MIN 3 VIEWS)      2. Bursitis of right elbow, unspecified bursa  M70.31           - Impression of xray of right elbow shows no fractures or dislocations but a soft tissue swelling over posterior aspect consistent with bursitis. Low concern for cellulitis, septic joint, compartment syndrome, neurovascular compromise or sepsis.   - Pt to drink lots of fluids  - Pt to take medication as prescribed  - Pt ok to take tylenol and ibuprofen as needed  - Pt to rest, elevate and ice right elbow as needed  - Pt to call if any symptoms worsen or follow up with PCP if not better in 7 days  - Pt to go to ER if have shortness of breath, chest pain, sudden fever or complete loss of feeling of right fingers    Discussed PCP follow up for persisting or worsening symptoms, or to return to the clinic if unable to obtain PCP follow up for worsening symptoms.    The patient tolerated their visit well. The patient and/or the family were informed of the results of any tests, a time was given to answer questions, a plan was proposed and they agreed with plan. Reviewed AVS with treatment instructions and answered questions - pt/family expresses understanding and agreement with the discussed treatment plan and AVS instructions.      SUBJECTIVE/OBJECTIVE:  HPI:   74 y.o. male presents for complaint of pt states he noticed some mild tenderness of his right elbow yesterday and then he noticed today the right elbow is swollen. Pt denies any recent trauma to his right elbow but he has been lifting weights at the gym. Pt denies any numbness or tingling to the right fingers. Pt denies any decreased ROM of right elbow.    Denies fevers, body aches or chills.     Has

## 2025-01-24 ENCOUNTER — HOSPITAL ENCOUNTER (EMERGENCY)
Age: 75
Discharge: HOME | End: 2025-01-24
Attending: STUDENT IN AN ORGANIZED HEALTH CARE EDUCATION/TRAINING PROGRAM
Payer: MEDICARE

## 2025-01-24 VITALS
BODY MASS INDEX: 28.39 KG/M2 | OXYGEN SATURATION: 95 % | HEART RATE: 62 BPM | RESPIRATION RATE: 17 BRPM | TEMPERATURE: 97 F | SYSTOLIC BLOOD PRESSURE: 144 MMHG | WEIGHT: 209.6 LBS | DIASTOLIC BLOOD PRESSURE: 89 MMHG | HEIGHT: 72 IN

## 2025-01-24 DIAGNOSIS — S40.021A ARM BRUISE, RIGHT, INITIAL ENCOUNTER: Primary | ICD-10-CM

## 2025-01-24 PROCEDURE — 99282 EMERGENCY DEPT VISIT SF MDM: CPT

## 2025-01-24 ASSESSMENT — LIFESTYLE VARIABLES
HOW MANY STANDARD DRINKS CONTAINING ALCOHOL DO YOU HAVE ON A TYPICAL DAY: PATIENT DOES NOT DRINK
HOW OFTEN DO YOU HAVE A DRINK CONTAINING ALCOHOL: NEVER

## 2025-01-25 NOTE — ED PROVIDER NOTES
ED Attending Attestation Note     Date of evaluation: 1/24/2025    I have discussed the case with the resident. I have personally performed a history, physical exam, and my own medical decision making. I have reviewed the note and agree with the findings and plan.     INITIAL VITALS: BP: (!) 172/109, Temp: 97 °F (36.1 °C), Pulse: 56, Respirations: 20, SpO2: 99 %     MDM:  My assessment reveals a well-appearing 74-year-old male presenting with bruising and swelling of his right elbow.  He is on Eliquis for A-fib and was working out at the gym a few days ago when he noticed some swelling of his right elbow.  He went to urgent care yesterday and had negative x-rays.  Since then, he has had worsening bruising about the right bicep that seems to have spread to his right elbow.  On exam, no evidence of septic joint.  Suspect he has fluid collection in that joint with subsequent bruising given his easy bruisability from Eliquis.  Ultimately, patient discharged home with strict return precautions and PCP follow-up.         Denny Fernandez MD  01/24/25 0365

## 2025-01-25 NOTE — ED NOTES
Patient discharged to home in good condition. Vital signs remain stable and within normal limits. Was provided with copy of discharge instructions and all questions were answered. Follow up care was explained to patient and they expressed agreement with the treatment plan for follow up. Respirations remain even and unlabored. No acute distress is noted.        Jay Bloom, RN  01/24/25 [id number]

## 2025-01-25 NOTE — ED PROVIDER NOTES
THE [facility]  EMERGENCY DEPARTMENT ENCOUNTER          EM RESIDENT NOTE     Date of evaluation: 1/24/2025    Chief Complaint     Arm Problem    History of Present Illness     Parveen Caballero is a 74 y.o. malewith a history [facility] who presents for evaluation of right elbow bruising.  He reports that 2 days ago he was working out on a machine in the gym which required him to put pressure on his elbows and flex and extend his arms.  He did not notice any pain or injury at that time.  The next morning he noticed what he described as half of a softball swelling over his olecranon process.  It was not bothering him at all or causing any discomfort.  He went to an urgent care and had x-rays of his arm which were negative.  Throughout the day today the area has become less swollen but has developed overlying bruising extending over his entire posterior elbow and up to his lateral upper arm.  He can denies any associated discomfort, numbness or tingling, fever chills, any other systemic symptoms.    MEDICAL DECISION MAKING / ASSESSMENT / PLAN     INITIAL VITALS: BP: (!) 172/109, Temp: 97 °F (36.1 °C), Pulse: 56, Respirations: 20, SpO2: 99 %    Parveen Caballero is a 74 [facility]. [facility] presenting for right elbow bruising. appeared comfortable, conversational, and was in no acute distress. Found to have hypertension but otherwise normal vitals.     Exam consistent with likely hemorrhagic olecranon bursitis that is resolving.  Suspect related to pressure put on the elbow during his workout and being on anticoagulation.  He was neurovascularly intact.  He had full range of motion of the elbow without any bony or soft tissue tenderness, recent negative x-rays.  No evidence of overlying cellulitis.  Given his lack of systemic symptoms, redness, warmth to the touch, full range of motion feel that septic arthritis is unlikely.  Discussed that this will likely resolve on its own but strict return precautions  (obstructive sleep apnea), and Pure hypercholesterolemia.  He has a past surgical history that includes Cystoscopy (1-11-10); Colonoscopy (8/18/2006 ); Appendectomy; lymph node biopsy (5/11/11); lymph node dissection (N/A, 2011); Tonsillectomy; Upper gastrointestinal endoscopy (N/A, 7/23/2022); and Upper gastrointestinal endoscopy (N/A, 9/21/2022).  His family history includes Heart Disease in his mother; Heart Failure in his mother; Hypertension in his mother.  He reports that he has never smoked. He has never been exposed to tobacco smoke. He has never used smokeless tobacco. He reports that he does not drink alcohol and does not use drugs.    Medications     Previous Medications    CARVEDILOL (COREG) 6.25 MG TABLET    Take 1 tablet by mouth 2 times daily    ELIQUIS 5 MG TABS TABLET    TAKE 1 TABLET BY MOUTH TWICE A DAY    FAMOTIDINE (PEPCID) 40 MG TABLET    Take 1 tablet by mouth at bedtime    HYDRALAZINE (APRESOLINE) 50 MG TABLET    TAKE ONE TABLET BY MOUTH EVERY MORNING AND TAKE ONE TABLET BY MOUTH EVERY NIGHT AT BEDTIME    TAMSULOSIN (FLOMAX) 0.4 MG CAPSULE    Take 1 capsule by mouth daily       Allergies     He is allergic to dextromethorphan, norvasc [amlodipine], and gentamicin.    Physical Exam     INITIAL VITALS: BP: (!) 172/109, Temp: 97 °F (36.1 °C), Pulse: 56, Respirations: 20, SpO2: 99 %     General: Well appearing. Found sitting up in bed, resting comfortably, answering questions appropriately and in complete sentences.   Eyes: Pupils reactive. EOMI.   ENT: Tolerating oral secretions. Moist mucous membranes.  Pulmonary: No tachypnea or accessory muscle use.  Cardiac: Regular rate and rhythm. Strong radial pulses.   Extremities: No edema in BLE, normal muscle bulk and tone.   RUE: Full range of motion of the wrist and elbow without any bony tenderness.  Enlarged Edematous and fluctuant olecranon bursa with overlying bruising that is spread to the medial aspect of the arm extending toward the upper

## 2025-01-25 NOTE — DISCHARGE INSTRUCTIONS
Seen today for bruising on your elbow.  It sounds like you likely had spontaneous bleeding into the bursa or fluid padding sac around your elbow likely from your workout in the gym.  This is likely because you are on Eliquis.  It does not seem to be causing any issues.  Your body will continue to breakdown the blood and get better on its own.  If you were to develop difficulty moving your elbow, fevers, worsening redness, swelling, pain you should come back to the emergency department as these may indicate infection

## 2025-02-17 RX ORDER — CARVEDILOL 6.25 MG/1
6.25 TABLET ORAL 2 TIMES DAILY
Qty: 180 TABLET | Refills: 3 | Status: CANCELLED | OUTPATIENT
Start: 2025-02-17

## (undated) DEVICE — CANNULA SAMP CO2 AD GRN 7FT CO2 AND 7FT O2 TBNG UNIV CONN

## (undated) DEVICE — FORCEPS BX L240CM JAW DIA2.4MM ORNG L CAP W/ NDL DISP RAD